# Patient Record
Sex: FEMALE | Race: WHITE | NOT HISPANIC OR LATINO | Employment: PART TIME | URBAN - METROPOLITAN AREA
[De-identification: names, ages, dates, MRNs, and addresses within clinical notes are randomized per-mention and may not be internally consistent; named-entity substitution may affect disease eponyms.]

---

## 2024-05-18 NOTE — PROGRESS NOTES
Jie BEARD  [id number]  1975      Reason for visit:  bilateral pelvic masses     Consultation:  Patient is being seen at the request of Adela SANTANA     History of present illness:  The patient is a 48 y.o. year old  female who presents today for treatment and evaluation of the above issues. Pt endorses 2 months ago started to have severe abdominal pain and nausea and did not seek care due to lack of insurance. Over last 2 months, back and abdominal pain became daily. She had daily nausea with anorexia and would force herself to eat. Denies vaginal and rectal bleeding. Denies breast pain. 1 month ago, was at a tanning bed appt and when she wiped lotion on her belly, she felt masses (mainly on her right side) that she hoped would go away on their own.    Went to her doctors within last 2-3 weeks and had a CT scanning showing bilateral masses.     CT imaging at OSH showed bilateral pelvic masses likely ovarian. Right measures 64b01mc, and left 11x5cm with septations.  is 65      For new patients, [facility] intake form from   was reviewed and confirmed.    OBGYN History:  She is a .  She does not use HRT. She has an IUD in place. She had a BTL 20 years ago. She does not have a history of abnormal pap smears. Last pap smear 6-7 years ago. Has had one mammogram that was normal, but not for many years. No hx of colonoscopy     Oncologic History:  Oncology/Hematology History    No history exists.         Past Medical History:   Diagnosis Date    Abdominal pain     Change in vision     Chills     Cough     Difficulty breathing     Fatigue     Multiple gestation     Nausea     Night sweats     Poor appetite     Urinary tract infection     Weight gain        Past Surgical History:   Procedure Laterality Date    TUBAL ABDOMINAL LIGATION         MEDICATIONS:    Current Outpatient Medications:     ondansetron (ZOFRAN) 4 MG tablet, Take 1 tablet by mouth Every 8 (Eight) Hours As Needed for Nausea or  "Vomiting., Disp: , Rfl:     oxyCODONE-acetaminophen (PERCOCET) 5-325 MG per tablet, Take 1 tablet by mouth Every 4 (Four) Hours As Needed., Disp: , Rfl:      Allergies:  is allergic to amoxicillin and penicillins.    Social History:   Social History     Socioeconomic History    Marital status: Single   Tobacco Use    Smoking status: Every Day     Current packs/day: 1.00     Average packs/day: 1 pack/day for 15.0 years (15.0 ttl pk-yrs)     Types: Cigarettes     Passive exposure: Current    Smokeless tobacco: Never   Vaping Use    Vaping status: Never Used   Substance and Sexual Activity    Alcohol use: Never    Drug use: Never    Sexual activity: Yes     Partners: Male     Birth control/protection: I.U.D., Tubal ligation       Family History:    Family History   Problem Relation Age of Onset    Stroke Father     Hypertension Father     Heart attack Father     Deep vein thrombosis Mother     Leukemia Brother     Testicular cancer Son        Health Maintenance:    Health Maintenance   Topic Date Due    MAMMOGRAM  Never done    COLORECTAL CANCER SCREENING  Never done    TDAP/TD VACCINES (1 - Tdap) Never done    COVID-19 Vaccine (1 - 2023-24 season) Never done    HEPATITIS C SCREENING  Never done    ANNUAL PHYSICAL  Never done    PAP SMEAR  Never done    INFLUENZA VACCINE  08/01/2024    Pneumococcal Vaccine 0-64  Aged Out       Review of Systems:  Please refer to history of present illness.  Review of systems otherwise negative.  Physical Exam:  Vitals:    05/20/24 1006   Resp: 18   Weight: 68.5 kg (151 lb 1.6 oz)   Height: 175.3 cm (69\")   PainSc:   9   PainLoc: Back  Comment: radiates around both sides to the front     Body mass index is 22.31 kg/m².  Wt Readings from Last 3 Encounters:   05/20/24 68.5 kg (151 lb 1.6 oz)     PHQ-9 Depression Screening  Little interest or pleasure in doing things? 0-->not at all   Feeling down, depressed, or hopeless? 0-->not at all   Trouble falling or staying asleep, or sleeping too " much? 3-->nearly every day   Feeling tired or having little energy? 3-->nearly every day   Poor appetite or overeating? 3-->nearly every day   Feeling bad about yourself - or that you are a failure or have let yourself or your family down? 0-->not at all   Trouble concentrating on things, such as reading the newspaper or watching television? 0-->not at all   Moving or speaking so slowly that other people could have noticed? Or the opposite - being so fidgety or restless that you have been moving around a lot more than usual? 0-->not at all   Thoughts that you would be better off dead, or of hurting yourself in some way? 0-->not at all   PHQ-9 Total Score 9   If you checked off any problems, how difficult have these problems made it for you to do your work, take care of things at home, or get along with other people? not difficult at all       GENERAL: Alert, well-appearing female appearing her stated age who is in no apparent distress.   HEENT: Sclera anicteric. Head normocephalic, atraumatic. Mucus membranes moist.   BREASTS: normal fibrocystic changes bilaterally, no LAD   CARDIO: no murmurs, rubs, gallops. Normal heart sounds   RESPIRATORY: normal respiratory effort, CTAB  GASTROINTESTINAL:  Abdomen is soft, mildly tender to palpation (R>L),   no rebound or guarding, bilateral masses that extend 1cm above umbilicus   SKIN:  Warm, dry, well-perfused.  All visible areas intact.  No rashes, lesions, ulcers.  PSYCHIATRIC: AO x3, with appropriate affect, normal thought processes.  NEUROLOGIC: No focal deficits.   Moves extremities well.  MUSCULOSKELETAL: Normal gait and station.   EXTREMITIES:   No cyanosis, clubbing, symmetric.  LYMPHATICS:  No cervical or inguinal adenopathy noted.     PELVIC exam:  External genitalia are free from lesion. On speculum examination, the cervix was free from lesion. On bimanual examination bilateral masses appreciated. + mobility of mass on right side. Uterus was normal in size and  "shape. There is no cervical motion or uterine tenderness. No cervical mass was palpated. Parametria were smooth. Rectovaginal exam was deferred.     ECOG PS 0    PROCEDURES:  None    Diagnostic Data:    No Images in the past 120 days found..    No results found for: \"WBC\", \"HGB\", \"HCT\", \"MCV\", \"PLT\", \"NEUTROABS\", \"GLUCOSE\", \"BUN\", \"CREATININE\", \"EGFRIFNONA\", \"EGFRIFAFRI\", \"NA\", \"K\", \"CL\", \"CO2\", \"MG\", \"PHOS\", \"CALCIUM\", \"ALBUMIN\", \"AST\", \"ALT\", \"BILITOT\"  No results found for: \"TSH\"  No results found for: \"FT4\"  No results found for: \"\"      Assessment & Plan   This is a 48 y.o. woman  here today for bilateral pelvic masses       Encounter Diagnoses   Name Primary?    Pelvic mass Yes    Bilateral tubo-ovarian mass        Pain assessment was performed today as a part of patient’s care.  For patients with pain related to surgery, gynecologic malignancy or cancer treatment, the plan is as noted in the assessment/plan.  For patients with pain not related to these issues, they are to seek any further needed care from a more appropriate provider, such as PCP.      Orders Placed This Encounter   Procedures    CEA     Standing Status:   Future     Standing Expiration Date:   2025     Order Specific Question:   Release to patient     Answer:   Routine Release [[id number]]     Bilateral pelvic masses  - on OSH records was 65. No CEA available, ordered for today  -CT imaging reviewed. Right mass 17x13, left mass 11x5. Appear to be ovarian in origin. + complex features that may be concerning for malignancy. No free fluid in abdomen or other abnormalities noted  - Patient was consented for cystoscopy with temporary bilateral ureteral catheters, total abdominal hysterectomy, bilateral salpingo-oopherectomy, possible omentectomy  -Risks and benefits of surgery were discussed.  This included, but was not limited to, infection and bleeding like when the skin is cut; damage to surrounding structures; and " incisional complications.  Risk of DVT was addressed for major surgeries.  Standard of care efforts to minimize these risks were reviewed.  Typical hospital stay and recovery were discussed as well as post-procedure precautions.  Surgical implications of chronic illnesses on recovery and surgical outcome were reviewed.   -Pain medication regimen for postoperative care was discussed.  Typical regimen and avoidance of narcotics was discussed.  Patient was educated that other factors, such as existing narcotic use, can impact postoperative pain management.    -Risks and benefits of lymph node dissection were further discussed.  This included lymphocyst, hematoma, lymphedema, vascular injury, and nerve injury.  -Patient verbalized understanding of the plan including the risks and benefits.  Appropriate perioperative testing including laboratory evaluation, EKG as clinically indicated, chest x-ray as clinically indicated, and preadmission evaluation were all ordered as a part of this patient's care.    Current smoker   -Encouraged smoking cessation as patient has has a 30+ year pack history and will aid in wound healing and lower post-surgical complication     FOLLOW UP: surgery this week     Noemi Brody MD  OBGYN PGY-3 Resident Physician     I spent 60 minutes caring for Jie on this date of service. This time includes time spent by me in the following activities: preparing for the visit, reviewing tests, performing a medically appropriate examination and/or evaluation, counseling and educating the patient/family/caregiver, ordering medications, tests, or procedures, referring and communicating with other health care professionals, documenting information in the medical record, and care coordination    Patient was seen and examined with Dr. Reardon,  resident, who performed portions of the examination and documentation for this patient's care under my direct supervision.  I agree with the above  documentation and plan.    Shanda Dubois MD  05/20/24  12:53 EDT

## 2024-05-18 NOTE — H&P (VIEW-ONLY)
Jie BEARD  [id number]  1975      Reason for visit:  bilateral pelvic masses     Consultation:  Patient is being seen at the request of Adela SANTANA     History of present illness:  The patient is a 48 y.o. year old  female who presents today for treatment and evaluation of the above issues. Pt endorses 2 months ago started to have severe abdominal pain and nausea and did not seek care due to lack of insurance. Over last 2 months, back and abdominal pain became daily. She had daily nausea with anorexia and would force herself to eat. Denies vaginal and rectal bleeding. Denies breast pain. 1 month ago, was at a tanning bed appt and when she wiped lotion on her belly, she felt masses (mainly on her right side) that she hoped would go away on their own.    Went to her doctors within last 2-3 weeks and had a CT scanning showing bilateral masses.     CT imaging at OSH showed bilateral pelvic masses likely ovarian. Right measures 30h12hh, and left 11x5cm with septations.  is 65      For new patients, Replaced by [facility] intake form from   was reviewed and confirmed.    OBGYN History:  She is a .  She does not use HRT. She has an IUD in place. She had a BTL 20 years ago. She does not have a history of abnormal pap smears. Last pap smear 6-7 years ago. Has had one mammogram that was normal, but not for many years. No hx of colonoscopy     Oncologic History:  Oncology/Hematology History    No history exists.         Past Medical History:   Diagnosis Date    Abdominal pain     Change in vision     Chills     Cough     Difficulty breathing     Fatigue     Multiple gestation     Nausea     Night sweats     Poor appetite     Urinary tract infection     Weight gain        Past Surgical History:   Procedure Laterality Date    TUBAL ABDOMINAL LIGATION         MEDICATIONS:    Current Outpatient Medications:     ondansetron (ZOFRAN) 4 MG tablet, Take 1 tablet by mouth Every 8 (Eight) Hours As Needed for Nausea or  "Vomiting., Disp: , Rfl:     oxyCODONE-acetaminophen (PERCOCET) 5-325 MG per tablet, Take 1 tablet by mouth Every 4 (Four) Hours As Needed., Disp: , Rfl:      Allergies:  is allergic to amoxicillin and penicillins.    Social History:   Social History     Socioeconomic History    Marital status: Single   Tobacco Use    Smoking status: Every Day     Current packs/day: 1.00     Average packs/day: 1 pack/day for 15.0 years (15.0 ttl pk-yrs)     Types: Cigarettes     Passive exposure: Current    Smokeless tobacco: Never   Vaping Use    Vaping status: Never Used   Substance and Sexual Activity    Alcohol use: Never    Drug use: Never    Sexual activity: Yes     Partners: Male     Birth control/protection: I.U.D., Tubal ligation       Family History:    Family History   Problem Relation Age of Onset    Stroke Father     Hypertension Father     Heart attack Father     Deep vein thrombosis Mother     Leukemia Brother     Testicular cancer Son        Health Maintenance:    Health Maintenance   Topic Date Due    MAMMOGRAM  Never done    COLORECTAL CANCER SCREENING  Never done    TDAP/TD VACCINES (1 - Tdap) Never done    COVID-19 Vaccine (1 - 2023-24 season) Never done    HEPATITIS C SCREENING  Never done    ANNUAL PHYSICAL  Never done    PAP SMEAR  Never done    INFLUENZA VACCINE  08/01/2024    Pneumococcal Vaccine 0-64  Aged Out       Review of Systems:  Please refer to history of present illness.  Review of systems otherwise negative.  Physical Exam:  Vitals:    05/20/24 1006   Resp: 18   Weight: 68.5 kg (151 lb 1.6 oz)   Height: 175.3 cm (69\")   PainSc:   9   PainLoc: Back  Comment: radiates around both sides to the front     Body mass index is 22.31 kg/m².  Wt Readings from Last 3 Encounters:   05/20/24 68.5 kg (151 lb 1.6 oz)     PHQ-9 Depression Screening  Little interest or pleasure in doing things? 0-->not at all   Feeling down, depressed, or hopeless? 0-->not at all   Trouble falling or staying asleep, or sleeping too " much? 3-->nearly every day   Feeling tired or having little energy? 3-->nearly every day   Poor appetite or overeating? 3-->nearly every day   Feeling bad about yourself - or that you are a failure or have let yourself or your family down? 0-->not at all   Trouble concentrating on things, such as reading the newspaper or watching television? 0-->not at all   Moving or speaking so slowly that other people could have noticed? Or the opposite - being so fidgety or restless that you have been moving around a lot more than usual? 0-->not at all   Thoughts that you would be better off dead, or of hurting yourself in some way? 0-->not at all   PHQ-9 Total Score 9   If you checked off any problems, how difficult have these problems made it for you to do your work, take care of things at home, or get along with other people? not difficult at all       GENERAL: Alert, well-appearing female appearing her stated age who is in no apparent distress.   HEENT: Sclera anicteric. Head normocephalic, atraumatic. Mucus membranes moist.   BREASTS: normal fibrocystic changes bilaterally, no LAD   CARDIO: no murmurs, rubs, gallops. Normal heart sounds   RESPIRATORY: normal respiratory effort, CTAB  GASTROINTESTINAL:  Abdomen is soft, mildly tender to palpation (R>L),   no rebound or guarding, bilateral masses that extend 1cm above umbilicus   SKIN:  Warm, dry, well-perfused.  All visible areas intact.  No rashes, lesions, ulcers.  PSYCHIATRIC: AO x3, with appropriate affect, normal thought processes.  NEUROLOGIC: No focal deficits.   Moves extremities well.  MUSCULOSKELETAL: Normal gait and station.   EXTREMITIES:   No cyanosis, clubbing, symmetric.  LYMPHATICS:  No cervical or inguinal adenopathy noted.     PELVIC exam:  External genitalia are free from lesion. On speculum examination, the cervix was free from lesion. On bimanual examination bilateral masses appreciated. + mobility of mass on right side. Uterus was normal in size and  "shape. There is no cervical motion or uterine tenderness. No cervical mass was palpated. Parametria were smooth. Rectovaginal exam was deferred.     ECOG PS 0    PROCEDURES:  None    Diagnostic Data:    No Images in the past 120 days found..    No results found for: \"WBC\", \"HGB\", \"HCT\", \"MCV\", \"PLT\", \"NEUTROABS\", \"GLUCOSE\", \"BUN\", \"CREATININE\", \"EGFRIFNONA\", \"EGFRIFAFRI\", \"NA\", \"K\", \"CL\", \"CO2\", \"MG\", \"PHOS\", \"CALCIUM\", \"ALBUMIN\", \"AST\", \"ALT\", \"BILITOT\"  No results found for: \"TSH\"  No results found for: \"FT4\"  No results found for: \"\"      Assessment & Plan   This is a 48 y.o. woman  here today for bilateral pelvic masses       Encounter Diagnoses   Name Primary?    Pelvic mass Yes    Bilateral tubo-ovarian mass        Pain assessment was performed today as a part of patient’s care.  For patients with pain related to surgery, gynecologic malignancy or cancer treatment, the plan is as noted in the assessment/plan.  For patients with pain not related to these issues, they are to seek any further needed care from a more appropriate provider, such as PCP.      Orders Placed This Encounter   Procedures    CEA     Standing Status:   Future     Standing Expiration Date:   2025     Order Specific Question:   Release to patient     Answer:   Routine Release [[id number]]     Bilateral pelvic masses  - on OSH records was 65. No CEA available, ordered for today  -CT imaging reviewed. Right mass 17x13, left mass 11x5. Appear to be ovarian in origin. + complex features that may be concerning for malignancy. No free fluid in abdomen or other abnormalities noted  - Patient was consented for cystoscopy with temporary bilateral ureteral catheters, total abdominal hysterectomy, bilateral salpingo-oopherectomy, possible omentectomy  -Risks and benefits of surgery were discussed.  This included, but was not limited to, infection and bleeding like when the skin is cut; damage to surrounding structures; and " incisional complications.  Risk of DVT was addressed for major surgeries.  Standard of care efforts to minimize these risks were reviewed.  Typical hospital stay and recovery were discussed as well as post-procedure precautions.  Surgical implications of chronic illnesses on recovery and surgical outcome were reviewed.   -Pain medication regimen for postoperative care was discussed.  Typical regimen and avoidance of narcotics was discussed.  Patient was educated that other factors, such as existing narcotic use, can impact postoperative pain management.    -Risks and benefits of lymph node dissection were further discussed.  This included lymphocyst, hematoma, lymphedema, vascular injury, and nerve injury.  -Patient verbalized understanding of the plan including the risks and benefits.  Appropriate perioperative testing including laboratory evaluation, EKG as clinically indicated, chest x-ray as clinically indicated, and preadmission evaluation were all ordered as a part of this patient's care.    Current smoker   -Encouraged smoking cessation as patient has has a 30+ year pack history and will aid in wound healing and lower post-surgical complication     FOLLOW UP: surgery this week     Noemi Brody MD  OBGYN PGY-3 Resident Physician     I spent 60 minutes caring for Jie on this date of service. This time includes time spent by me in the following activities: preparing for the visit, reviewing tests, performing a medically appropriate examination and/or evaluation, counseling and educating the patient/family/caregiver, ordering medications, tests, or procedures, referring and communicating with other health care professionals, documenting information in the medical record, and care coordination    Patient was seen and examined with Dr. Reardon,  resident, who performed portions of the examination and documentation for this patient's care under my direct supervision.  I agree with the above  documentation and plan.    Shanda Dubois MD  05/20/24  12:53 EDT

## 2024-05-20 ENCOUNTER — OFFICE VISIT (OUTPATIENT)
Dept: GYNECOLOGIC ONCOLOGY | Facility: CLINIC | Age: 49
End: 2024-05-20
Payer: COMMERCIAL

## 2024-05-20 ENCOUNTER — PREP FOR SURGERY (OUTPATIENT)
Dept: OTHER | Facility: HOSPITAL | Age: 49
End: 2024-05-20
Payer: COMMERCIAL

## 2024-05-20 VITALS — HEIGHT: 69 IN | RESPIRATION RATE: 18 BRPM | BODY MASS INDEX: 22.38 KG/M2 | WEIGHT: 151.1 LBS

## 2024-05-20 DIAGNOSIS — N83.8 BILATERAL TUBO-OVARIAN MASS: Primary | ICD-10-CM

## 2024-05-20 DIAGNOSIS — N83.8 BILATERAL TUBO-OVARIAN MASS: ICD-10-CM

## 2024-05-20 DIAGNOSIS — R19.00 PELVIC MASS: Primary | ICD-10-CM

## 2024-05-20 DIAGNOSIS — R19.00 PELVIC MASS: ICD-10-CM

## 2024-05-20 PROCEDURE — 1160F RVW MEDS BY RX/DR IN RCRD: CPT | Performed by: OBSTETRICS & GYNECOLOGY

## 2024-05-20 PROCEDURE — 99205 OFFICE O/P NEW HI 60 MIN: CPT | Performed by: OBSTETRICS & GYNECOLOGY

## 2024-05-20 PROCEDURE — 1159F MED LIST DOCD IN RCRD: CPT | Performed by: OBSTETRICS & GYNECOLOGY

## 2024-05-20 PROCEDURE — 1125F AMNT PAIN NOTED PAIN PRSNT: CPT | Performed by: OBSTETRICS & GYNECOLOGY

## 2024-05-20 RX ORDER — ACETAMINOPHEN 500 MG
1000 TABLET ORAL ONCE
Status: CANCELLED | OUTPATIENT
Start: 2024-05-20 | End: 2024-05-20

## 2024-05-20 RX ORDER — SODIUM CHLORIDE 0.9 % (FLUSH) 0.9 %
10 SYRINGE (ML) INJECTION EVERY 12 HOURS SCHEDULED
Status: CANCELLED | OUTPATIENT
Start: 2024-05-20

## 2024-05-20 RX ORDER — HEPARIN SODIUM 5000 [USP'U]/ML
5000 INJECTION, SOLUTION INTRAVENOUS; SUBCUTANEOUS ONCE
Status: CANCELLED | OUTPATIENT
Start: 2024-05-20 | End: 2024-05-20

## 2024-05-20 RX ORDER — OXYCODONE HYDROCHLORIDE AND ACETAMINOPHEN 5; 325 MG/1; MG/1
1 TABLET ORAL EVERY 4 HOURS PRN
COMMUNITY
End: 2024-05-25 | Stop reason: HOSPADM

## 2024-05-20 RX ORDER — ONDANSETRON 4 MG/1
4 TABLET, FILM COATED ORAL EVERY 8 HOURS PRN
COMMUNITY

## 2024-05-20 RX ORDER — SODIUM CHLORIDE 9 MG/ML
40 INJECTION, SOLUTION INTRAVENOUS AS NEEDED
Status: CANCELLED | OUTPATIENT
Start: 2024-05-20

## 2024-05-20 RX ORDER — SODIUM CHLORIDE 0.9 % (FLUSH) 0.9 %
10 SYRINGE (ML) INJECTION AS NEEDED
Status: CANCELLED | OUTPATIENT
Start: 2024-05-20

## 2024-05-20 RX ORDER — SCOLOPAMINE TRANSDERMAL SYSTEM 1 MG/1
1 PATCH, EXTENDED RELEASE TRANSDERMAL CONTINUOUS
Status: CANCELLED | OUTPATIENT
Start: 2024-05-20 | End: 2024-05-23

## 2024-05-20 RX ORDER — CELECOXIB 200 MG/1
200 CAPSULE ORAL ONCE
Status: CANCELLED | OUTPATIENT
Start: 2024-05-20 | End: 2024-05-20

## 2024-05-20 RX ORDER — PREGABALIN 150 MG/1
150 CAPSULE ORAL ONCE
Status: CANCELLED | OUTPATIENT
Start: 2024-05-20 | End: 2024-05-20

## 2024-05-20 NOTE — PATIENT INSTRUCTIONS
Surgery Instructions            Name: GABI BEARD  MRN: [id number]  : 1975      SURGEON:  Shanda Dubois MD    Surgery Coordinator: Tayler MCBRIDE    Gynecological Oncology  [address] [facility] [address] [place] [address], [address]  Phone: [phone]                   Fax: [phone]      Pre-Admission Testing Appointment    You have a Pre-Admission Testing (PAT) appointment on 2024  at 10:30 AM   You will need to be at hospital registration 10 minutes before that time. Directions to PAT and Registration will be listed below.    We understand your time is valuable. To prevent delays, please bring the following to your PAT apt. if it applies to you:  Written physician orders (if given to you by your physician)  All medications in the original bottles including over-the-counter medications (not a list)  Copy of living will or power of  documents   Copy of recent test results (EKG, stress test, echo, heart cath, etc.)  Copy of pacemaker or ICD cards and date of last interrogation   Copy of cardiac clearance letter from your cardiologist or primary care physician if history of heart problems  Name and phone number of your pharmacy, primary care physician and/or cardiologist  CPAP or BiPAP settings    Surgery Appointment      Your surgery has been scheduled on 2024.  You will need to go to Main Registration to check in at 08:00 AM . Then you will be sent to the [address] [place] [address] second floor surgery registration desk to check in.    Nothing by mouth after midnight on 2024.    If you are feeling sick, have a fever or cough and have seen your PCP let our office know 48 hours prior to surgery. It may be subject to rescheduling.       The Day of Surgery:    Do not chew gum or tobacco, smoke, or eat mints or hard candy. Shower and wash your hair. You may brush your teeth but do not swallow water. Use any wipes that Pre-admission testing has given you.     Please arrive for  surgery as instructed by the pre-op nurse, often one to two hours before your surgery.  Once you are called to go to your pre-op room, no one will be allowed in the pre op room.   Please note no one under age 12 is permitted to stay in the waiting area without supervision.  Remove all jewelry, including rings and piercings. Do not bring valuables to the hospital.  Wear loose-fitting clothing.  Avoid wearing eye makeup or contact lenses  We make every effort to begin surgery at your scheduled start time but delays do occur. We will keep you and your family updated about any delays  Please note: you MUST have a  over the age of 18 to drive you home from the hospital. You may not use Uber, Lyft or a taxi.    Please remember to bring:    Photo ID and current medical insurance card  Advanced directives, living will or power of  (if applicable)  Current list of all medications, including over-the- counter and herbal supplements  List of allergies  CPAP device if you have sleep apnea  Any assistive devices or equipment needed after surgery    While You are In the Pre-Op Room:  The nurse will review your health history and will place an IV (into the vein) in your hand or arm for fluids and medicines.  An anesthesia provider will talk with you about anesthesia and pain control during and after surgery.  A member of the surgical team can answer your questions.    Directions to [facility]  [address] [facility] ? [place] [address] ? [phone]    From [address] and [address] [facility]:  Take [address] to the [address] exit. Go right on [address] to [address]. Right on [address] to [employer].   Left on [address] to [facility] which is on the left.    From [address] of [place]:  Get off [address] at the [address] exit. Go left on [address] to [address]. Right on [address] to [employer] [address] [employer]. Left on   [facility] to [facility]  [place] which is on the left.     From the South ([address]):  Follow [address] to approximately one mile inside [address]. [facility] is on the right at [address] and   [facility].     Parking:  Free  Parking - Take Entrance 2 off of [address] and go straight ahead to [address] [place] [address].  Self Parking - Take Entrance 1 off of [address], bear left and follow the road to [facility].    Directions to Registration:  If entering through front of [address] [place] [address] ( parking), take a right and proceed up the hallway connecting [address] [place] [address] to   [facility]. Registration is on the left about [facility] up the [address].    If entering from [facility], take garage elevator to first floor (1), exit to the right and proceed through the doors to outside, follow the covered sidewalk to entrance of [facility], follow signs to [address] [place] [address], this leads to the [facility] lobby and information desk. Proceed past the information desk to the hallway that connects [address] [place] [address] to the [facility]. Registration is on the left about [facility] up the [address].    Directions to Pre-admission Testing:  Follow directions to Registration and Pre-admission Testing is next door to Registration             PREPARING FOR SURGERY  **Disability or Work Release Forms     Work: The amount of time you will be off work after surgery depends on both your surgery and your job. Discuss this with your doctor before surgery. If you have any questions about this, call your doctor.  You must provide all forms completed and signed to the GYN ONCOLOGY office.    FORM FOR AUHTHORIZATION FOR USE AND/OR DISCLOSURE OF PROCTED HEALTH INFORMATION CAN BE PROVIDED UPON REQUEST.    Preoperative Evaluation and Optimization  If your doctor tells you to get a preoperative evaluation from your primary care provider, cardiologist, or other specialist, it is your responsibility to make sure to complete these well before your surgery. We  "want you to get evaluated to make sure you are as healthy as possible when you have your surgery. If the evaluation, including all recommended testing, is not done in time, your surgery will be postponed.    If you take diabetic medications please consult with the prescriber.  Continue antidepressants, Beta Blockers \"olol\", anti-seizure medication, GERD medication (heartburn), Opioids and Parkinson's medication.  Let us know if you have a history of blood clots or are taking a blood thinner before your surgery, this will need to be held and you will need to discuss this with staff.   If you are taking any weight loss medications please let our staff now. Ideally they will need to be held 2 weeks prior to your procedure.  You are allowed 1 visitor that may remain in the waiting room at both locations.  Visitors cannot come back to pre-op or post-op areas.    Please note: you MUST have a  over the age of 18 to drive you home from the hospital. You may not use Uber, Lyft or a taxi.    Physical Fitness  Research shows that getting more physical activity before surgery can lower your risk for problems after surgery. Walking is a great way to improve your fitness level before surgery. Even if you start walking just a few weeks before surgery, it can make a big difference.     Quit Smoking  If you smoke, your risk of having a lung problem is at least twice that of a non-smoker.    Surgical incisions will not heal as well and you have a higher risk of infection  The heart has to work harder.  It is best to quit smoking 6 to 8 weeks before surgery. This gives your lungs more time to recover.    Outpatient Surgery  You will need to have someone bring you to the hospital, stay in the waiting room during your procedure and take you home at discharge. It is recommended that someone stay with you 24 hours after your procedure.     If you live more than a 4-hour drive away from the hospital, or live in an area without easy " access to an emergency department, we recommend you plan to spend another night or two close to the hospital before you go home. For assistance with hotel, prices and vouchers let our office know and we can let you talk with our Oncology Social worker, Meenakshi Paulson.     Post-Operative Visit  You will be scheduled a post-operative appointment for 3 weeks after your surgical procedure. If you do not have an appointment please call the office and have that scheduled.     How to prevent nausea  The best way to prevent nausea is to eat frequent small meals. It is especially important to eat something before taking pain medication. Take your ondansetron if you are feeling nauseous do not wait.    Pain Management after Surgery    If you have kidney disease or liver disease and are not to take ibuprofen or Tylenol please let your doctor or nurse know.     Driving: Do not drive while you are taking prescription pain medications.     It is normal to have some pain after surgery. The goal of managing your acute pain after surgery is to minimize your pain so you feel comfortable enough to get up, take deep breaths, wash, get dressed, and do simple tasks in your home. Some discomfort is likely. We do not expect you to be completely free of pain.   Pain is usually worst the first 24-48 hours after surgery.    What can I do to relieve pain without medications?   Apply heat with a warm compress, hot water bottle, or heating pad. Do not put anything hot directly on your skin or lie on top of it.   Apply cooling with a cold gel pack, bag of peas, or crushed ice. Wrap in a soft cloth or towel.   Do not push or press on your incision. It is normal for your incision to be sore for up to 6 weeks if you push on it.   Unless your doctor gives you a different plan, ibuprofen and acetaminophen are the main medicines you will use to manage your pain.   You may also get a prescription for an opioid such as oxycodone or hydrocodone. Opioids  should only be added as needed to reduce pain that is not adequately relieved by ibuprofen and acetaminophen.                                                                                         Typical Pain Medication Schedule  6 am Ibuprofen 600 mg   9 am acetaminophen 650 mg   12:00 pm Noon Ibuprofen 600 mg   3:00 pm Acetaminophen 650 mg   6:00 pm Ibuprofen 600 mg   9:00 pm Acetaminophen 650 mg   12:00 am Midnight  Ibuprofen 600 mg.      What if this schedule does not control my pain?   Please call the office and let us know at [phone]  Reduce the number and frequency of opioids as soon as you can. Do not take more opioid medication than your doctor has prescribed.   Common side effects and risks of opioids include drowsiness, mental confusion, dizziness, nausea, constipation, itching, dry mouth, and slowed breathing.   Never mix opioids with alcohol, sleep aids or anti-anxiety medications. These are dangerous combinations that increase the harmful effects of opioid pain medication. Many overdose deaths from opioids also involve at least one other drug or alcohol.   It is illegal to sell or share an opioid without a prescription properly issued by a licensed health care prescriber.    What is the best way to stop taking pain medications?  1. Stop opioid use.  2. Stop acetaminophen.  3. Gradually decrease how often you take ibuprofen. It is a good idea to take a 600 mg pill before you start a more tiring activity such as going shopping or for a long walk.  Once you get more active, you may have a day when your pain gets a little worse. If this happens, take ibuprofen. If ibuprofen does not relieve the pain, add acetaminophen.    What do I need to know about bowel movements?   Starting as soon as you get home, take 17 grams of Miralax (one capful) twice a day to keep your stool soft and prevent constipation. It is important to prevent constipation because straining can damage your stitches. Your stool  should be as soft as toothpaste. If your stool gets too loose, cut back to using Miralax only once a day.   If you used a bowel prep before surgery, it is common not to have a bowel movement on the first and second day after surgery.   If you have not had a bowel movement by 7 p.m. on the third day after surgery, do one of the following at bedtime:  Drink 1 ounce (2 tablespoons) of Milk of Magnesia (MOM). If you have used MOM before and know you need to take 2 ounces for it to work for you, it is OK to do this, or Take 2 Senekot tablets.   Go for short walks. Walking and being active will help you have a bowel movement.   If you have not had a bowel movement by noon on the fourth day after surgery, call the clinic where you were seen and ask to speak with a nurse.    What kind of vaginal bleeding is normal?  Spotting of pink or red blood from the vagina is normal. Brown-colored discharge that gradually changes to a light yellow or cream color is also normal and can last up to 6 weeks. The brownish discharge is old blood and often has a strong odor, this is okay. Call us if it becomes heavier or foul smelling or you are saturating a maxi pad within an hour.     At Home after Surgery: If you experience a medical emergency call 911 or have someone drive you to your nearest emergency department.     When should I call my doctor?  Call your doctor right away, any time of the day or night, including on weekends and holidays, if you have any of the following signs or symptoms:   A temperature over 100.4°F (38°C) If you don't have one, please buy a thermometer before your surgery.   Heavy bleeding (soaking a regular pad in an hour or less)   Severe pain in your abdomen or pelvis that the pain medication is not helping   Chest pain or difficulty breathing   Swelling, redness, or pain in your legs   An incision that opens   An incision that is red or hot   Fluid or blood leaking from an incision   New bruising after leaving  the hospital that is large or spreading. A little bit of bruising around an incision is normal.   Nausea and vomiting    Skin rash   Unable to urinate at all   Pain or stinging when you pass urine   Blood or cloudiness in your urine   Non-stop urge to pass urine, but only dribbling when you try to go   A sense that something is wrong.    Caring for post-surgical incisions     Do not have vaginal intercourse until your doctor evaluates you at a postop visit and tells you OK.     Showers: You may shower starting 24 hours after your surgery.    NO BATHS: do not take a tub bath up to 6 weeks after surgery.   Do not put any lotion, oil, gel, or powder on or near your incisions.     For incisions inside your vagina: Incisions inside the vagina are closed with dissolvable stitches. When they dissolve you may see little bits of suture material that look like thin pieces of string on your underwear or on toilet tissue after wiping. This is normal. Do not put anything inside the vagina until your doctor evaluates you at a postop visit and tells you when it will be OK.      For incisions on your skin: If there is a dressing over the incision, remove it before your first shower. Leave the slim adhesive strips that are under the dressing in place. During the week after surgery, they will usually curl up at the edges and then come off on their own. If they are still there a week after surgery, gently remove them.  To clean the incisions, first wash your hands, and then get your hands sudsy with soap and gently wash or let the sudsy water run down over the incisions. Dry the incisions well after washing by gently patting with a towel. You may use a blow dryer, but it must be on a low-heat setting.    When will my bladder function get back to normal?   You received extra fluid through your I.V. while you were in the hospital, so it is normal to urinate (pee) more than usual when you first get home.   It is normal for your bladder  function to be different after surgery. You may notice a pause before your urine stream starts or that your urine stream is slower. This will gradually get better, but it may take up to 6 months before you are back to normal. Be patient, relax, and sit on the toilet a little longer.   Drinking more water than usual will not help the bladder recover faster.    What is a normal energy level?  It is normal to have a decreased energy level after surgery. Listen to your body. If you need to rest, do it. Give yourself permission to take it easy. Once you settle into a normal routine at home, you will find that you slowly begin to feel better. Walking around the house and taking short walks outside will help you get back to normal.    What kind of exercise/activities can I do?   Exercise is important for a healthy recovery. We encourage you to begin normal physical activity, like walking, within hours of surgery. Start with short walks and gradually increase the distance and length of time that you walk.   Allow your body time to heal. Do not restart a difficult exercise routine until you have had your post-op exam and your doctor says it is OK.   Lifting: Unless you are given other instructions, for 6 weeks after your surgery do not lift anything over 15 pounds.   Travel: It is best if you do not go far away from home before your postop visit with your doctor. If you have travel plans, talk to your doctor about this before your surgery.      Financial Assistance:    If you have any questions or need assistance, contact your [facility] financial counseling office from 8:30 a.m.-4:30  p.m. Monday through Friday. Closed weekends.   [place]: [phone] or, or visit at [address] [facility], [address], near the entrance.  Financial Assistance Application available upon request      Patient Payments and Correspondence  Customer service representatives are available to assist you from 8:00 a.m. to 6:00 p.m. Eastern  Standard Time by calling [phone] Monday through Friday. You can also contact us through [employer].    [facility]  [address]  [place] [address] [phone]

## 2024-05-21 ENCOUNTER — PRE-ADMISSION TESTING (OUTPATIENT)
Dept: PREADMISSION TESTING | Facility: HOSPITAL | Age: 49
DRG: 738 | End: 2024-05-21
Payer: COMMERCIAL

## 2024-05-21 ENCOUNTER — HOSPITAL ENCOUNTER (OUTPATIENT)
Dept: GENERAL RADIOLOGY | Facility: HOSPITAL | Age: 49
Discharge: HOME | End: 2024-05-21
Payer: COMMERCIAL

## 2024-05-21 VITALS — WEIGHT: 150.57 LBS | HEIGHT: 69 IN | BODY MASS INDEX: 22.3 KG/M2

## 2024-05-21 DIAGNOSIS — R19.00 PELVIC MASS: ICD-10-CM

## 2024-05-21 DIAGNOSIS — N83.8 BILATERAL TUBO-OVARIAN MASS: ICD-10-CM

## 2024-05-21 LAB
ABO GROUP BLD: NORMAL
ALBUMIN SERPL-MCNC: 4.6 G/DL (ref 3.5–5.2)
ALBUMIN/GLOB SERPL: 1.4 G/DL
ALP SERPL-CCNC: 87 U/L (ref 39–117)
ALT SERPL W P-5'-P-CCNC: 18 U/L (ref 1–33)
ANION GAP SERPL CALCULATED.3IONS-SCNC: 7 MMOL/L (ref 5–15)
AST SERPL-CCNC: 22 U/L (ref 1–32)
B-HCG UR QL: NEGATIVE
BASOPHILS # BLD AUTO: 0.05 10*3/MM3 (ref 0–0.2)
BASOPHILS NFR BLD AUTO: 0.5 % (ref 0–1.5)
BILIRUB SERPL-MCNC: 0.3 MG/DL (ref 0–1.2)
BLD GP AB SCN SERPL QL: NEGATIVE
BUN SERPL-MCNC: 11 MG/DL (ref 6–20)
BUN/CREAT SERPL: 13.3 (ref 7–25)
CALCIUM SPEC-SCNC: 9.8 MG/DL (ref 8.6–10.5)
CHLORIDE SERPL-SCNC: 103 MMOL/L (ref 98–107)
CO2 SERPL-SCNC: 29 MMOL/L (ref 22–29)
CREAT SERPL-MCNC: 0.83 MG/DL (ref 0.57–1)
DEPRECATED RDW RBC AUTO: 44.7 FL (ref 37–54)
EGFRCR SERPLBLD CKD-EPI 2021: 86.5 ML/MIN/1.73
EOSINOPHIL # BLD AUTO: 0.1 10*3/MM3 (ref 0–0.4)
EOSINOPHIL NFR BLD AUTO: 1 % (ref 0.3–6.2)
ERYTHROCYTE [DISTWIDTH] IN BLOOD BY AUTOMATED COUNT: 13.6 % (ref 12.3–15.4)
GLOBULIN UR ELPH-MCNC: 3.2 GM/DL
GLUCOSE SERPL-MCNC: 90 MG/DL (ref 65–99)
HCT VFR BLD AUTO: 41 % (ref 34–46.6)
HGB BLD-MCNC: 13.5 G/DL (ref 12–15.9)
IMM GRANULOCYTES # BLD AUTO: 0.03 10*3/MM3 (ref 0–0.05)
IMM GRANULOCYTES NFR BLD AUTO: 0.3 % (ref 0–0.5)
LYMPHOCYTES # BLD AUTO: 2.69 10*3/MM3 (ref 0.7–3.1)
LYMPHOCYTES NFR BLD AUTO: 26.4 % (ref 19.6–45.3)
MCH RBC QN AUTO: 29.4 PG (ref 26.6–33)
MCHC RBC AUTO-ENTMCNC: 32.9 G/DL (ref 31.5–35.7)
MCV RBC AUTO: 89.3 FL (ref 79–97)
MONOCYTES # BLD AUTO: 0.48 10*3/MM3 (ref 0.1–0.9)
MONOCYTES NFR BLD AUTO: 4.7 % (ref 5–12)
NEUTROPHILS NFR BLD AUTO: 6.83 10*3/MM3 (ref 1.7–7)
NEUTROPHILS NFR BLD AUTO: 67.1 % (ref 42.7–76)
NRBC BLD AUTO-RTO: 0 /100 WBC (ref 0–0.2)
PLATELET # BLD AUTO: 362 10*3/MM3 (ref 140–450)
PMV BLD AUTO: 9.1 FL (ref 6–12)
POTASSIUM SERPL-SCNC: 4.3 MMOL/L (ref 3.5–5.2)
PROT SERPL-MCNC: 7.8 G/DL (ref 6–8.5)
RBC # BLD AUTO: 4.59 10*6/MM3 (ref 3.77–5.28)
RH BLD: POSITIVE
SODIUM SERPL-SCNC: 139 MMOL/L (ref 136–145)
T&S EXPIRATION DATE: NORMAL
WBC NRBC COR # BLD AUTO: 10.18 10*3/MM3 (ref 3.4–10.8)

## 2024-05-21 PROCEDURE — 81025 URINE PREGNANCY TEST: CPT

## 2024-05-21 PROCEDURE — 86900 BLOOD TYPING SEROLOGIC ABO: CPT

## 2024-05-21 PROCEDURE — 86901 BLOOD TYPING SEROLOGIC RH(D): CPT

## 2024-05-21 PROCEDURE — 36415 COLL VENOUS BLD VENIPUNCTURE: CPT

## 2024-05-21 PROCEDURE — 71045 X-RAY EXAM CHEST 1 VIEW: CPT

## 2024-05-21 PROCEDURE — 80053 COMPREHEN METABOLIC PANEL: CPT

## 2024-05-21 PROCEDURE — 86850 RBC ANTIBODY SCREEN: CPT

## 2024-05-21 PROCEDURE — 85025 COMPLETE CBC W/AUTO DIFF WBC: CPT

## 2024-05-21 NOTE — PAT
An arrival time for procedure was not provided during PAT visit. If patient had any questions or concerns about their arrival time, they were instructed to contact their surgeon/physician.  Additionally, if the patient referred to an arrival time that was acquired from their my chart account, patient was encouraged to verify that time with their surgeon/physician. Arrival times are NOT provided in Pre Admission Testing Department.    Patient to apply Chlorhexadine wipes  to surgical area (as instructed) the night before procedure and the AM of procedure. Wipes provided.    Patient denies any current skin issues.     Blood bank bracelet applied to patient during Pre Admission Testing visit.  Patient instructed not to remove from arm until after procedure and they are discharged from the hospital.  Explained to patient that they may shower and get the bracelet wet, but not to immerse under water for longer periods (bathing, swimming, hand dishwashing, etc).  Patient verbalized understanding.    It was noted during Pre Admission Testing that patient was wearing some form of fingernail polish (gel/regular) and/or acrylic/artificial nails.  Patient was told that polish and/or artificial nails must be removed for surgery.  If a patient had recent manicure, and would rather not remove polish or artificial nails. Then the minimum requirement is that the polish/artificial nails must be removed from the middle finger on each hand.  Patient verbalized understanding.    If patient was having surgery on an upper extremity, then the patient was instructed that fingernail polish/artificial fingernails must be removed for surgery.  NO EXCEPTIONS.  Patient verbalized understanding.    If patient was having surgery on a lower extremity, then the patient was instructed that toenail polish on both extremities must be removed for surgery.  NO EXCEPTIONS. Patient verbalized understanding.    Patient directed to Radiology Department for  CXR after Pre Admission Testing Appointment.       Patient viewed general PAT education video as instructed in their preoperative information received from their surgeon.  Patient stated the general PAT education video was viewed in its entirety and survey completed.  Copies of PAT general education handouts (Incentive Spirometry, Meds to Beds Program, Patient Belongings, Pre-op skin preparation instructions, Blood Glucose testing, Visitor policy, Surgery FAQ, Code H) distributed to patient if not printed. Education related to the PAT pass and skin preparation for surgery (if applicable) completed in PAT as a reinforcement to PAT education video. Patient instructed to return PAT pass provided today as well as completed skin preparation sheet (if applicable) on the day of procedure.     Additionally if patient had not viewed video yet but intended to view it at home or in our waiting area, then referred them to the handout with QR code/link provided during PAT visit.  Instructed patient to complete survey after viewing the video in its entirety.  Encouraged patient/family to read PAT general education handouts thoroughly and notify PAT staff with any questions or concerns. Patient verbalized understanding of all information and priority content.

## 2024-05-22 ENCOUNTER — TELEPHONE (OUTPATIENT)
Dept: GYNECOLOGIC ONCOLOGY | Facility: CLINIC | Age: 49
End: 2024-05-22
Payer: COMMERCIAL

## 2024-05-22 NOTE — TELEPHONE ENCOUNTER
CONFIRM SURGERY ON 05/23/2024 PLEASE ARRIVE ON 10:45 AM TO MAIN REGISTRATION AT [facility]    NPO AFTER MIDNIGHT ON 05/22/2024

## 2024-05-23 ENCOUNTER — ANESTHESIA EVENT (OUTPATIENT)
Dept: PERIOP | Facility: HOSPITAL | Age: 49
End: 2024-05-23
Payer: COMMERCIAL

## 2024-05-23 ENCOUNTER — ANESTHESIA (OUTPATIENT)
Dept: PERIOP | Facility: HOSPITAL | Age: 49
End: 2024-05-23
Payer: COMMERCIAL

## 2024-05-23 ENCOUNTER — HOSPITAL ENCOUNTER (INPATIENT)
Facility: HOSPITAL | Age: 49
LOS: 2 days | Discharge: HOME | DRG: 738 | End: 2024-05-25
Attending: OBSTETRICS & GYNECOLOGY | Admitting: OBSTETRICS & GYNECOLOGY
Payer: COMMERCIAL

## 2024-05-23 ENCOUNTER — PATIENT ROUNDING (BHMG ONLY) (OUTPATIENT)
Dept: GYNECOLOGIC ONCOLOGY | Facility: CLINIC | Age: 49
End: 2024-05-23
Payer: COMMERCIAL

## 2024-05-23 ENCOUNTER — ANESTHESIA EVENT CONVERTED (OUTPATIENT)
Dept: ANESTHESIOLOGY | Facility: HOSPITAL | Age: 49
DRG: 738 | End: 2024-05-23
Payer: COMMERCIAL

## 2024-05-23 DIAGNOSIS — C56.3 MALIGNANT NEOPLASM OF BOTH OVARIES: Primary | ICD-10-CM

## 2024-05-23 DIAGNOSIS — N83.8 BILATERAL TUBO-OVARIAN MASS: ICD-10-CM

## 2024-05-23 DIAGNOSIS — R19.00 PELVIC MASS: ICD-10-CM

## 2024-05-23 LAB
ABO GROUP BLD: NORMAL
B-HCG UR QL: NEGATIVE
EXPIRATION DATE: NORMAL
INTERNAL NEGATIVE CONTROL: NEGATIVE
INTERNAL POSITIVE CONTROL: POSITIVE
LAB AP CASE REPORT: NORMAL
Lab: NORMAL
Lab: NORMAL
RH BLD: POSITIVE

## 2024-05-23 PROCEDURE — 25010000002 PHENYLEPHRINE 10 MG/ML SOLUTION 1 ML VIAL: Performed by: ANESTHESIOLOGY

## 2024-05-23 PROCEDURE — 25010000002 BUPIVACAINE (PF) 0.25 % SOLUTION: Performed by: NURSE ANESTHETIST, CERTIFIED REGISTERED

## 2024-05-23 PROCEDURE — 25010000002 MIDAZOLAM PER 1 MG: Performed by: ANESTHESIOLOGY

## 2024-05-23 PROCEDURE — 88305 TISSUE EXAM BY PATHOLOGIST: CPT | Performed by: OBSTETRICS & GYNECOLOGY

## 2024-05-23 PROCEDURE — 25810000003 SODIUM CHLORIDE PER 500 ML: Performed by: OBSTETRICS & GYNECOLOGY

## 2024-05-23 PROCEDURE — 86901 BLOOD TYPING SEROLOGIC RH(D): CPT

## 2024-05-23 PROCEDURE — 25810000003 LACTATED RINGERS PER 1000 ML: Performed by: STUDENT IN AN ORGANIZED HEALTH CARE EDUCATION/TRAINING PROGRAM

## 2024-05-23 PROCEDURE — 25010000002 FENTANYL CITRATE (PF) 50 MCG/ML SOLUTION: Performed by: NURSE ANESTHETIST, CERTIFIED REGISTERED

## 2024-05-23 PROCEDURE — 86900 BLOOD TYPING SEROLOGIC ABO: CPT

## 2024-05-23 PROCEDURE — 0UT90ZZ RESECTION OF UTERUS, OPEN APPROACH: ICD-10-PCS | Performed by: OBSTETRICS & GYNECOLOGY

## 2024-05-23 PROCEDURE — 0DBU0ZZ EXCISION OF OMENTUM, OPEN APPROACH: ICD-10-PCS | Performed by: OBSTETRICS & GYNECOLOGY

## 2024-05-23 PROCEDURE — 25010000002 ONDANSETRON PER 1 MG

## 2024-05-23 PROCEDURE — 0UT20ZZ RESECTION OF BILATERAL OVARIES, OPEN APPROACH: ICD-10-PCS | Performed by: OBSTETRICS & GYNECOLOGY

## 2024-05-23 PROCEDURE — 25010000002 PROPOFOL 10 MG/ML EMULSION: Performed by: ANESTHESIOLOGY

## 2024-05-23 PROCEDURE — 52005 CYSTO W/URTRL CATHJ: CPT | Performed by: OBSTETRICS & GYNECOLOGY

## 2024-05-23 PROCEDURE — 81025 URINE PREGNANCY TEST: CPT | Performed by: OBSTETRICS & GYNECOLOGY

## 2024-05-23 PROCEDURE — 25010000002 DEXAMETHASONE SODIUM PHOSPHATE 10 MG/ML SOLUTION: Performed by: NURSE ANESTHETIST, CERTIFIED REGISTERED

## 2024-05-23 PROCEDURE — C1758 CATHETER, URETERAL: HCPCS | Performed by: OBSTETRICS & GYNECOLOGY

## 2024-05-23 PROCEDURE — 88360 TUMOR IMMUNOHISTOCHEM/MANUAL: CPT | Performed by: OBSTETRICS & GYNECOLOGY

## 2024-05-23 PROCEDURE — 25010000002 ONDANSETRON PER 1 MG: Performed by: ANESTHESIOLOGY

## 2024-05-23 PROCEDURE — 88307 TISSUE EXAM BY PATHOLOGIST: CPT | Performed by: OBSTETRICS & GYNECOLOGY

## 2024-05-23 PROCEDURE — 0TJB8ZZ INSPECTION OF BLADDER, VIA NATURAL OR ARTIFICIAL OPENING ENDOSCOPIC: ICD-10-PCS | Performed by: OBSTETRICS & GYNECOLOGY

## 2024-05-23 PROCEDURE — 0UT70ZZ RESECTION OF BILATERAL FALLOPIAN TUBES, OPEN APPROACH: ICD-10-PCS | Performed by: OBSTETRICS & GYNECOLOGY

## 2024-05-23 PROCEDURE — 0DBW0ZX EXCISION OF PERITONEUM, OPEN APPROACH, DIAGNOSTIC: ICD-10-PCS | Performed by: OBSTETRICS & GYNECOLOGY

## 2024-05-23 PROCEDURE — 88302 TISSUE EXAM BY PATHOLOGIST: CPT | Performed by: OBSTETRICS & GYNECOLOGY

## 2024-05-23 PROCEDURE — 25810000003 LACTATED RINGERS SOLUTION: Performed by: STUDENT IN AN ORGANIZED HEALTH CARE EDUCATION/TRAINING PROGRAM

## 2024-05-23 PROCEDURE — 25810000003 LACTATED RINGERS PER 1000 ML: Performed by: OBSTETRICS & GYNECOLOGY

## 2024-05-23 PROCEDURE — 25010000002 SUGAMMADEX 200 MG/2ML SOLUTION: Performed by: ANESTHESIOLOGY

## 2024-05-23 PROCEDURE — 25010000002 HEPARIN (PORCINE) PER 1000 UNITS: Performed by: OBSTETRICS & GYNECOLOGY

## 2024-05-23 PROCEDURE — 58952 RESECT OVARIAN MALIGNANCY: CPT | Performed by: OBSTETRICS & GYNECOLOGY

## 2024-05-23 PROCEDURE — 07BC0ZX EXCISION OF PELVIS LYMPHATIC, OPEN APPROACH, DIAGNOSTIC: ICD-10-PCS | Performed by: OBSTETRICS & GYNECOLOGY

## 2024-05-23 PROCEDURE — 25010000002 CEFAZOLIN PER 500 MG: Performed by: OBSTETRICS & GYNECOLOGY

## 2024-05-23 PROCEDURE — 25010000002 HYDROMORPHONE 1 MG/ML SOLUTION

## 2024-05-23 PROCEDURE — 88342 IMHCHEM/IMCYTCHM 1ST ANTB: CPT | Performed by: OBSTETRICS & GYNECOLOGY

## 2024-05-23 PROCEDURE — 0DTJ0ZZ RESECTION OF APPENDIX, OPEN APPROACH: ICD-10-PCS | Performed by: OBSTETRICS & GYNECOLOGY

## 2024-05-23 PROCEDURE — 88341 IMHCHEM/IMCYTCHM EA ADD ANTB: CPT | Performed by: OBSTETRICS & GYNECOLOGY

## 2024-05-23 PROCEDURE — 25810000003 LACTATED RINGERS PER 1000 ML: Performed by: ANESTHESIOLOGY

## 2024-05-23 PROCEDURE — 88331 PATH CONSLTJ SURG 1 BLK 1SPC: CPT | Performed by: PATHOLOGY

## 2024-05-23 PROCEDURE — 25010000002 DEXAMETHASONE PER 1 MG: Performed by: ANESTHESIOLOGY

## 2024-05-23 RX ORDER — IBUPROFEN 600 MG/1
600 TABLET ORAL EVERY 6 HOURS
Status: DISCONTINUED | OUTPATIENT
Start: 2024-05-24 | End: 2024-05-25

## 2024-05-23 RX ORDER — BUPIVACAINE HYDROCHLORIDE 2.5 MG/ML
INJECTION, SOLUTION EPIDURAL; INFILTRATION; INTRACAUDAL
Status: COMPLETED | OUTPATIENT
Start: 2024-05-23 | End: 2024-05-23

## 2024-05-23 RX ORDER — PHENYLEPHRINE HCL IN 0.9% NACL 1 MG/10 ML
SYRINGE (ML) INTRAVENOUS AS NEEDED
Status: DISCONTINUED | OUTPATIENT
Start: 2024-05-23 | End: 2024-05-23 | Stop reason: SURG

## 2024-05-23 RX ORDER — SODIUM CHLORIDE, SODIUM LACTATE, POTASSIUM CHLORIDE, CALCIUM CHLORIDE 600; 310; 30; 20 MG/100ML; MG/100ML; MG/100ML; MG/100ML
75 INJECTION, SOLUTION INTRAVENOUS CONTINUOUS
Status: DISCONTINUED | OUTPATIENT
Start: 2024-05-23 | End: 2024-05-24

## 2024-05-23 RX ORDER — ONDANSETRON 2 MG/ML
4 INJECTION INTRAMUSCULAR; INTRAVENOUS EVERY 6 HOURS PRN
Status: DISCONTINUED | OUTPATIENT
Start: 2024-05-24 | End: 2024-05-24

## 2024-05-23 RX ORDER — CELECOXIB 200 MG/1
200 CAPSULE ORAL ONCE
Status: COMPLETED | OUTPATIENT
Start: 2024-05-23 | End: 2024-05-23

## 2024-05-23 RX ORDER — OXYCODONE HYDROCHLORIDE 5 MG/1
5 TABLET ORAL EVERY 4 HOURS PRN
Status: DISCONTINUED | OUTPATIENT
Start: 2024-05-23 | End: 2024-05-25 | Stop reason: HOSPADM

## 2024-05-23 RX ORDER — ACETAMINOPHEN 500 MG
1000 TABLET ORAL ONCE
Status: COMPLETED | OUTPATIENT
Start: 2024-05-23 | End: 2024-05-23

## 2024-05-23 RX ORDER — MIDAZOLAM HYDROCHLORIDE 1 MG/ML
1 INJECTION INTRAMUSCULAR; INTRAVENOUS ONCE
Status: COMPLETED | OUTPATIENT
Start: 2024-05-23 | End: 2024-05-23

## 2024-05-23 RX ORDER — DROPERIDOL 2.5 MG/ML
0.62 INJECTION, SOLUTION INTRAMUSCULAR; INTRAVENOUS ONCE AS NEEDED
Status: DISCONTINUED | OUTPATIENT
Start: 2024-05-23 | End: 2024-05-23 | Stop reason: HOSPADM

## 2024-05-23 RX ORDER — AMOXICILLIN 250 MG
2 CAPSULE ORAL 2 TIMES DAILY PRN
Status: DISCONTINUED | OUTPATIENT
Start: 2024-05-23 | End: 2024-05-25

## 2024-05-23 RX ORDER — SODIUM CHLORIDE 9 MG/ML
INJECTION, SOLUTION INTRAVENOUS AS NEEDED
Status: DISCONTINUED | OUTPATIENT
Start: 2024-05-23 | End: 2024-05-23 | Stop reason: HOSPADM

## 2024-05-23 RX ORDER — ONDANSETRON 2 MG/ML
INJECTION INTRAMUSCULAR; INTRAVENOUS AS NEEDED
Status: DISCONTINUED | OUTPATIENT
Start: 2024-05-23 | End: 2024-05-23 | Stop reason: SURG

## 2024-05-23 RX ORDER — ONDANSETRON 2 MG/ML
INJECTION INTRAMUSCULAR; INTRAVENOUS
Status: COMPLETED
Start: 2024-05-23 | End: 2024-05-23

## 2024-05-23 RX ORDER — FAMOTIDINE 20 MG/1
20 TABLET, FILM COATED ORAL DAILY
Status: DISCONTINUED | OUTPATIENT
Start: 2024-05-24 | End: 2024-05-25 | Stop reason: HOSPADM

## 2024-05-23 RX ORDER — CALCIUM CARBONATE 500 MG/1
1 TABLET, CHEWABLE ORAL 3 TIMES DAILY PRN
Status: DISCONTINUED | OUTPATIENT
Start: 2024-05-23 | End: 2024-05-25 | Stop reason: HOSPADM

## 2024-05-23 RX ORDER — PROPOFOL 10 MG/ML
VIAL (ML) INTRAVENOUS AS NEEDED
Status: DISCONTINUED | OUTPATIENT
Start: 2024-05-23 | End: 2024-05-23 | Stop reason: SURG

## 2024-05-23 RX ORDER — FAMOTIDINE 10 MG/ML
20 INJECTION, SOLUTION INTRAVENOUS
Status: COMPLETED | OUTPATIENT
Start: 2024-05-23 | End: 2024-05-23

## 2024-05-23 RX ORDER — FENTANYL CITRATE 50 UG/ML
50 INJECTION, SOLUTION INTRAMUSCULAR; INTRAVENOUS
Status: DISCONTINUED | OUTPATIENT
Start: 2024-05-23 | End: 2024-05-23 | Stop reason: HOSPADM

## 2024-05-23 RX ORDER — PROMETHAZINE HYDROCHLORIDE 12.5 MG/1
12.5 SUPPOSITORY RECTAL EVERY 6 HOURS PRN
Status: DISCONTINUED | OUTPATIENT
Start: 2024-05-23 | End: 2024-05-25 | Stop reason: HOSPADM

## 2024-05-23 RX ORDER — FAMOTIDINE 20 MG/1
20 TABLET, FILM COATED ORAL
Status: COMPLETED | OUTPATIENT
Start: 2024-05-23 | End: 2024-05-23

## 2024-05-23 RX ORDER — ACETAMINOPHEN 325 MG/1
650 TABLET ORAL EVERY 6 HOURS SCHEDULED
Status: DISCONTINUED | OUTPATIENT
Start: 2024-05-23 | End: 2024-05-25 | Stop reason: HOSPADM

## 2024-05-23 RX ORDER — PROCHLORPERAZINE EDISYLATE 5 MG/ML
2.5 INJECTION INTRAMUSCULAR; INTRAVENOUS EVERY 6 HOURS PRN
Status: DISCONTINUED | OUTPATIENT
Start: 2024-05-23 | End: 2024-05-24

## 2024-05-23 RX ORDER — PROMETHAZINE HYDROCHLORIDE 12.5 MG/1
12.5 TABLET ORAL EVERY 6 HOURS PRN
Status: DISCONTINUED | OUTPATIENT
Start: 2024-05-23 | End: 2024-05-25 | Stop reason: HOSPADM

## 2024-05-23 RX ORDER — DEXAMETHASONE SODIUM PHOSPHATE 4 MG/ML
INJECTION, SOLUTION INTRA-ARTICULAR; INTRALESIONAL; INTRAMUSCULAR; INTRAVENOUS; SOFT TISSUE AS NEEDED
Status: DISCONTINUED | OUTPATIENT
Start: 2024-05-23 | End: 2024-05-23 | Stop reason: SURG

## 2024-05-23 RX ORDER — DEXAMETHASONE SODIUM PHOSPHATE 10 MG/ML
INJECTION, SOLUTION INTRAMUSCULAR; INTRAVENOUS
Status: COMPLETED | OUTPATIENT
Start: 2024-05-23 | End: 2024-05-23

## 2024-05-23 RX ORDER — HYDROMORPHONE HYDROCHLORIDE 1 MG/ML
0.5 INJECTION, SOLUTION INTRAMUSCULAR; INTRAVENOUS; SUBCUTANEOUS
Status: DISCONTINUED | OUTPATIENT
Start: 2024-05-23 | End: 2024-05-25

## 2024-05-23 RX ORDER — SODIUM CHLORIDE, SODIUM LACTATE, POTASSIUM CHLORIDE, CALCIUM CHLORIDE 600; 310; 30; 20 MG/100ML; MG/100ML; MG/100ML; MG/100ML
INJECTION, SOLUTION INTRAVENOUS CONTINUOUS PRN
Status: DISCONTINUED | OUTPATIENT
Start: 2024-05-23 | End: 2024-05-23 | Stop reason: SURG

## 2024-05-23 RX ORDER — SCOLOPAMINE TRANSDERMAL SYSTEM 1 MG/1
1 PATCH, EXTENDED RELEASE TRANSDERMAL CONTINUOUS
Status: DISCONTINUED | OUTPATIENT
Start: 2024-05-23 | End: 2024-05-25 | Stop reason: HOSPADM

## 2024-05-23 RX ORDER — MAGNESIUM HYDROXIDE 1200 MG/15ML
LIQUID ORAL AS NEEDED
Status: DISCONTINUED | OUTPATIENT
Start: 2024-05-23 | End: 2024-05-23 | Stop reason: HOSPADM

## 2024-05-23 RX ORDER — SODIUM CHLORIDE 0.9 % (FLUSH) 0.9 %
10 SYRINGE (ML) INJECTION AS NEEDED
Status: DISCONTINUED | OUTPATIENT
Start: 2024-05-23 | End: 2024-05-23 | Stop reason: HOSPADM

## 2024-05-23 RX ORDER — HEPARIN SODIUM 5000 [USP'U]/ML
INJECTION, SOLUTION INTRAVENOUS; SUBCUTANEOUS AS NEEDED
Status: DISCONTINUED | OUTPATIENT
Start: 2024-05-23 | End: 2024-05-23 | Stop reason: HOSPADM

## 2024-05-23 RX ORDER — ONDANSETRON 2 MG/ML
4 INJECTION INTRAMUSCULAR; INTRAVENOUS ONCE AS NEEDED
Status: COMPLETED | OUTPATIENT
Start: 2024-05-23 | End: 2024-05-23

## 2024-05-23 RX ORDER — HYDROMORPHONE HYDROCHLORIDE 1 MG/ML
0.5 INJECTION, SOLUTION INTRAMUSCULAR; INTRAVENOUS; SUBCUTANEOUS
Status: DISCONTINUED | OUTPATIENT
Start: 2024-05-23 | End: 2024-05-23 | Stop reason: HOSPADM

## 2024-05-23 RX ORDER — NALOXONE HCL 0.4 MG/ML
0.1 VIAL (ML) INJECTION
Status: DISCONTINUED | OUTPATIENT
Start: 2024-05-23 | End: 2024-05-25 | Stop reason: HOSPADM

## 2024-05-23 RX ORDER — SODIUM CHLORIDE, SODIUM LACTATE, POTASSIUM CHLORIDE, CALCIUM CHLORIDE 600; 310; 30; 20 MG/100ML; MG/100ML; MG/100ML; MG/100ML
1000 INJECTION, SOLUTION INTRAVENOUS CONTINUOUS
Status: DISCONTINUED | OUTPATIENT
Start: 2024-05-23 | End: 2024-05-24

## 2024-05-23 RX ORDER — OXYCODONE HYDROCHLORIDE 10 MG/1
10 TABLET ORAL EVERY 4 HOURS PRN
Status: DISCONTINUED | OUTPATIENT
Start: 2024-05-23 | End: 2024-05-25 | Stop reason: HOSPADM

## 2024-05-23 RX ORDER — ONDANSETRON 4 MG/1
4 TABLET, ORALLY DISINTEGRATING ORAL EVERY 6 HOURS PRN
Status: DISCONTINUED | OUTPATIENT
Start: 2024-05-24 | End: 2024-05-24

## 2024-05-23 RX ORDER — HEPARIN SODIUM 5000 [USP'U]/ML
5000 INJECTION, SOLUTION INTRAVENOUS; SUBCUTANEOUS ONCE
Status: DISCONTINUED | OUTPATIENT
Start: 2024-05-23 | End: 2024-05-23 | Stop reason: HOSPADM

## 2024-05-23 RX ORDER — LIDOCAINE HYDROCHLORIDE 10 MG/ML
INJECTION, SOLUTION EPIDURAL; INFILTRATION; INTRACAUDAL; PERINEURAL AS NEEDED
Status: DISCONTINUED | OUTPATIENT
Start: 2024-05-23 | End: 2024-05-23 | Stop reason: SURG

## 2024-05-23 RX ORDER — SIMETHICONE 80 MG
80 TABLET,CHEWABLE ORAL 4 TIMES DAILY PRN
Status: DISCONTINUED | OUTPATIENT
Start: 2024-05-23 | End: 2024-05-25 | Stop reason: HOSPADM

## 2024-05-23 RX ORDER — LIDOCAINE HYDROCHLORIDE 10 MG/ML
0.5 INJECTION, SOLUTION EPIDURAL; INFILTRATION; INTRACAUDAL; PERINEURAL ONCE AS NEEDED
Status: COMPLETED | OUTPATIENT
Start: 2024-05-23 | End: 2024-05-23

## 2024-05-23 RX ORDER — PREGABALIN 150 MG/1
150 CAPSULE ORAL ONCE
Status: COMPLETED | OUTPATIENT
Start: 2024-05-23 | End: 2024-05-23

## 2024-05-23 RX ORDER — SODIUM CHLORIDE 0.9 % (FLUSH) 0.9 %
10 SYRINGE (ML) INJECTION EVERY 12 HOURS SCHEDULED
Status: DISCONTINUED | OUTPATIENT
Start: 2024-05-23 | End: 2024-05-23 | Stop reason: HOSPADM

## 2024-05-23 RX ORDER — EPHEDRINE SULFATE 50 MG/ML
INJECTION INTRAVENOUS AS NEEDED
Status: DISCONTINUED | OUTPATIENT
Start: 2024-05-23 | End: 2024-05-23 | Stop reason: SURG

## 2024-05-23 RX ORDER — SODIUM CHLORIDE 9 MG/ML
40 INJECTION, SOLUTION INTRAVENOUS AS NEEDED
Status: DISCONTINUED | OUTPATIENT
Start: 2024-05-23 | End: 2024-05-23 | Stop reason: HOSPADM

## 2024-05-23 RX ADMIN — HYDROMORPHONE HYDROCHLORIDE 0.5 MG: 1 INJECTION, SOLUTION INTRAMUSCULAR; INTRAVENOUS; SUBCUTANEOUS at 18:20

## 2024-05-23 RX ADMIN — BUPIVACAINE HYDROCHLORIDE 60 ML: 2.5 INJECTION, SOLUTION EPIDURAL; INFILTRATION; INTRACAUDAL; PERINEURAL at 14:39

## 2024-05-23 RX ADMIN — ACETAMINOPHEN 650 MG: 325 TABLET ORAL at 22:51

## 2024-05-23 RX ADMIN — FAMOTIDINE 20 MG: 20 TABLET, FILM COATED ORAL at 13:03

## 2024-05-23 RX ADMIN — Medication 100 MCG: at 15:08

## 2024-05-23 RX ADMIN — SODIUM CHLORIDE, POTASSIUM CHLORIDE, SODIUM LACTATE AND CALCIUM CHLORIDE: 600; 310; 30; 20 INJECTION, SOLUTION INTRAVENOUS at 14:28

## 2024-05-23 RX ADMIN — PROPOFOL 15 MCG/KG/MIN: 10 INJECTION, EMULSION INTRAVENOUS at 14:41

## 2024-05-23 RX ADMIN — Medication 200 MCG: at 15:27

## 2024-05-23 RX ADMIN — SUGAMMADEX 200 MG: 100 INJECTION, SOLUTION INTRAVENOUS at 17:23

## 2024-05-23 RX ADMIN — CELECOXIB 200 MG: 200 CAPSULE ORAL at 12:57

## 2024-05-23 RX ADMIN — ONDANSETRON 4 MG: 2 INJECTION INTRAMUSCULAR; INTRAVENOUS at 19:42

## 2024-05-23 RX ADMIN — Medication 100 MCG: at 14:51

## 2024-05-23 RX ADMIN — FENTANYL CITRATE 100 MCG: 50 INJECTION, SOLUTION INTRAMUSCULAR; INTRAVENOUS at 14:32

## 2024-05-23 RX ADMIN — Medication 100 MCG: at 14:32

## 2024-05-23 RX ADMIN — EPHEDRINE SULFATE 10 MG: 50 INJECTION INTRAVENOUS at 15:18

## 2024-05-23 RX ADMIN — SODIUM CHLORIDE 2000 MG: 900 INJECTION INTRAVENOUS at 14:43

## 2024-05-23 RX ADMIN — PHENYLEPHRINE HYDROCHLORIDE 5 MCG/MIN: 10 INJECTION INTRAVENOUS at 15:42

## 2024-05-23 RX ADMIN — SODIUM CHLORIDE, POTASSIUM CHLORIDE, SODIUM LACTATE AND CALCIUM CHLORIDE 125 ML/HR: 600; 310; 30; 20 INJECTION, SOLUTION INTRAVENOUS at 20:04

## 2024-05-23 RX ADMIN — PREGABALIN 150 MG: 150 CAPSULE ORAL at 12:57

## 2024-05-23 RX ADMIN — ONDANSETRON 4 MG: 2 INJECTION INTRAMUSCULAR; INTRAVENOUS at 17:10

## 2024-05-23 RX ADMIN — ACETAMINOPHEN 1000 MG: 500 TABLET ORAL at 12:57

## 2024-05-23 RX ADMIN — SCOPOLAMINE 1 PATCH: 1.5 PATCH, EXTENDED RELEASE TRANSDERMAL at 12:58

## 2024-05-23 RX ADMIN — LIDOCAINE HYDROCHLORIDE 50 MG: 10 INJECTION, SOLUTION EPIDURAL; INFILTRATION; INTRACAUDAL; PERINEURAL at 14:32

## 2024-05-23 RX ADMIN — MIDAZOLAM HYDROCHLORIDE 1 MG: 1 INJECTION, SOLUTION INTRAMUSCULAR; INTRAVENOUS at 13:32

## 2024-05-23 RX ADMIN — SODIUM CHLORIDE, POTASSIUM CHLORIDE, SODIUM LACTATE AND CALCIUM CHLORIDE 1000 ML: 600; 310; 30; 20 INJECTION, SOLUTION INTRAVENOUS at 13:02

## 2024-05-23 RX ADMIN — LIDOCAINE HYDROCHLORIDE 0.2 ML: 10 INJECTION, SOLUTION EPIDURAL; INFILTRATION; INTRACAUDAL; PERINEURAL at 13:02

## 2024-05-23 RX ADMIN — EPHEDRINE SULFATE 10 MG: 50 INJECTION INTRAVENOUS at 15:27

## 2024-05-23 RX ADMIN — SODIUM CHLORIDE, POTASSIUM CHLORIDE, SODIUM LACTATE AND CALCIUM CHLORIDE 1000 ML: 600; 310; 30; 20 INJECTION, SOLUTION INTRAVENOUS at 18:05

## 2024-05-23 RX ADMIN — PROPOFOL 200 MG: 10 INJECTION, EMULSION INTRAVENOUS at 14:32

## 2024-05-23 RX ADMIN — DEXAMETHASONE SODIUM PHOSPHATE 4 MG: 10 INJECTION, SOLUTION INTRAMUSCULAR; INTRAVENOUS at 14:39

## 2024-05-23 RX ADMIN — OXYCODONE HYDROCHLORIDE 10 MG: 10 TABLET ORAL at 22:53

## 2024-05-23 RX ADMIN — DEXAMETHASONE SODIUM PHOSPHATE 4 MG: 4 INJECTION INTRA-ARTICULAR; INTRALESIONAL; INTRAMUSCULAR; INTRAVENOUS; SOFT TISSUE at 14:43

## 2024-05-23 RX ADMIN — DEXAMETHASONE SODIUM PHOSPHATE 4 MG: 10 INJECTION, SOLUTION INTRAMUSCULAR; INTRAVENOUS at 14:43

## 2024-05-23 NOTE — ANESTHESIA POSTPROCEDURE EVALUATION
Patient: Jie Aragon    Procedure Summary       Date: 05/23/24 Room / Location:  [facility] [address] [place] [address] [facility]    Anesthesia Start: 1428 Anesthesia Stop: 1756    Procedures:       EXPLORATORY LAPAROTOMY, TOTAL ABDOMINAL HYSTERECTOMY BILATERAL SALPINGO OOPHORECTOMY, CANCER STAGING/ OPTIMAL DEBULKLING (R=0) (Abdomen)      CYSTOSCOPY TEMPORARY PLACEMENT BILATERAL URETERAL CATHETER (Bilateral: Urethra)      OMENTECTOMY (Abdomen)      APPENDECTOMY (Abdomen) Diagnosis:       Bilateral tubo-ovarian mass      Pelvic mass      (Bilateral tubo-ovarian mass [N83.8])      (Pelvic mass [R19.00])    Surgeons: Shanda Dubois MD Provider: Julio C Dasilva Jr., MD    Anesthesia Type: general with block ASA Status: 2            Anesthesia Type: general with block    Vitals  Vitals Value Taken Time   BP     Temp     Pulse     Resp     SpO2 100 % 05/23/24 1756           Post Anesthesia Care and Evaluation    Patient location during evaluation: PACU  Patient participation: complete - patient participated  Level of consciousness: awake and alert  Pain management: adequate    Airway patency: patent  Anesthetic complications: No anesthetic complications  PONV Status: none  Cardiovascular status: hemodynamically stable and acceptable  Respiratory status: nonlabored ventilation, acceptable and nasal cannula  Hydration status: acceptable

## 2024-05-23 NOTE — ANESTHESIA PROCEDURE NOTES
"Peripheral Block      Patient reassessed immediately prior to procedure    Patient location during procedure: OR  Start time: 5/23/2024 2:39 PM  Reason for block: at surgeon's request and post-op pain management  Performed by  SUDHA/CAA: Rufus Brewer, TAWANA: Bobby Paredes SRNA  Preanesthetic Checklist  Completed: patient identified, IV checked, site marked, risks and benefits discussed, surgical consent, monitors and equipment checked, pre-op evaluation and timeout performed  Prep:  Pt Position: supine  Sterile barriers:cap, gloves, mask and washed/disinfected hands  Prep: ChloraPrep  Patient monitoring: blood pressure monitoring, continuous pulse oximetry and EKG  Procedure    Sedation: yes  Performed under: general  Guidance:ultrasound guided  Images:still images obtained, printed/placed on chart    Laterality:Bilateral  Block Type:TAP  Injection Technique:single-shot  Needle Type:short-bevel and echogenic  Needle Gauge:20 G  Resistance on Injection: none    Medications Used: dexamethasone sodium phosphate injection - Injection   4 mg - 5/23/2024 2:39:00 PM  bupivacaine PF (MARCAINE) 0.25 % injection - Injection   60 mL - 5/23/2024 2:39:00 PM      Medications  Comment:Block Injection:  LA dose divided between Right and Left block        Post Assessment  Injection Assessment: negative aspiration for heme, incremental injection and no paresthesia on injection  Patient Tolerance:comfortable throughout block  Complications:no  Additional Notes    Subcostal TAPs    A high-frequency linear transducer, with sterile cover, was placed sub-xiphoid to identify Line[place] Alba, right and left Rectus Abdominus Muscles (NORBERTO). The transducer was moved either right or left subcostally to identify the NORBERTO and the Transverse Abdominus Muscle (MAYES). The insertion site was prepped in sterile fashion and then localized with 2-5 ml of 1% Lidocaine. Using ultrasound-guidance, a 20-gauge B-Parra 4\" Ultraplex 360 non-stimulating " "echogenic needle was advanced in plane, from medial to lateral, until the tip of the needle was in the fascial plane between the NORBERTO and MAYES. 1-3ml of preservative free normal saline was used to hydro-dissect the fascial planes. After the fascial plane was verified, the local anesthetic (LA) was injected. The procedure was repeated on the opposite side for bilateral coverage. Aspiration every 5 ml to prevent intravascular injection. Injection was completed with negative aspiration of blood and negative intravascular injection. Injection pressures were normal with minimal resistance. The subcostal approach to the TAP nerve block ideally anesthetizes the intercostal nerves T6-T9.     Mid-Axillary/Lateral TAPs    A high-frequency linear transducer, with sterile cover, was placed in the midaxillary line between the ASIS and costal margin. The External Oblique Muscle (EOM), Internal Oblique Muscle (IOM), Transverse Abdominus Muscle (MAYES), and Peritoneum were identified. The insertion site was prepped in sterile fashion and then localized with 2-5 ml of 1% Lidocaine. Using ultrasound-guidance, a 20-gauge B-Parra 4\" Ultraplex 360 non-stimulating echogenic needle was advanced in plane, from medial to lateral, until the tip of the needle was in the fascial plane between the IOM and MAYES. 1-3ml of preservative free normal saline was used to hydro-dissect the fascial planes. After the fascial plane was verified, the local anesthetic (LA) was injected. The procedure was repeated on the opposite side for bilateral coverage. Aspiration every 5 ml to prevent intravascular injection. Injection was completed with negative aspiration of blood and negative intravascular injection. Injection pressures were normal with minimal resistance. Midaxillary TAPs should reach intercostal nerves T10- T11 and the subcostal nerve T12.              "

## 2024-05-23 NOTE — ANESTHESIA PROCEDURE NOTES
Airway  Urgency: elective    Date/Time: 5/23/2024 2:34 PM  Airway not difficult    General Information and Staff    Patient location during procedure: OR  SRNA: Bobby Paredes SRNA  Indications and Patient Condition  Indications for airway management: airway protection    Preoxygenated: yes  MILS not maintained throughout  Mask difficulty assessment: 1 - vent by mask    Final Airway Details  Final airway type: endotracheal airway      Successful airway: ETT  Cuffed: yes   Successful intubation technique: direct laryngoscopy  Endotracheal tube insertion site: oral  Blade: Freire  Blade size: 2  ETT size (mm): 7.0  Cormack-Lehane Classification: grade I - full view of glottis  Placement verified by: chest auscultation and capnometry   Measured from: lips  ETT/EBT  to lips (cm): 21  Number of attempts at approach: 1  Assessment: lips, teeth, and gum same as pre-op and atraumatic intubation    Additional Comments  Negative epigastric sounds, Breath sound equal bilaterally with symmetric chest rise and fall

## 2024-05-23 NOTE — INTERVAL H&P NOTE
[facility] Pre-op    Full history and physical note from office is attached.    VS: /70  HR 72  RR 16  T 98.2  sat 99%RA    Lungs: Clear to auscultation bilaterally, respirations unlabored  Heart:   Regular rate and rhythm, S1 and S2 normal    LAB Results:  Lab Results   Component Value Date    WBC 10.18 05/21/2024    HGB 13.5 05/21/2024    HCT 41.0 05/21/2024    MCV 89.3 05/21/2024     05/21/2024    NEUTROABS 6.83 05/21/2024    GLUCOSE 90 05/21/2024    BUN 11 05/21/2024    CREATININE 0.83 05/21/2024     05/21/2024    K 4.3 05/21/2024     05/21/2024    CO2 29.0 05/21/2024    CALCIUM 9.8 05/21/2024    ALBUMIN 4.6 05/21/2024    AST 22 05/21/2024    ALT 18 05/21/2024    BILITOT 0.3 05/21/2024       Cancer Staging (if applicable)  Cancer Patient: __ yes __no _X_unknown__N/A; If yes, clinical stage T:__ N:__M:__, stage group or __N/A      Impression: Bilateral tubo-ovarian mass       Plan: EXPLORATORY LAPAROTOMY, TOTAL ABDOMINAL HYSTERECTOMY BILATERAL SALPINGO OOPHORECTOMY, POSSIBLE CANCER STAGING; CYSTOSCOPY TEMPORARY PLACEMENT BILATERAL URETERAL CATHETER       ESTHER Covington   5/23/2024   12:25 EDT    I saw and evaluated the patient. I agree with the findings and the plan of care as documented in the note.    Shanda Dubois MD  05/23/24  13:42 EDT

## 2024-05-23 NOTE — OP NOTE
Subjective     Date of Service:  05/23/24  Time of Service:  17:28 EDT    Surgical Staff: Surgeons and Role:     * Shanda Dubois MD - Primary     * Noemi Brody MD - Resident - Assisting   Additional Staff: Dr. Brody was essential to the success of the case due to her retracting, irrigating, aspirating, suturing, and closing.     Pre-operative diagnosis(es): Pre-Op Diagnosis Codes:     * Bilateral tubo-ovarian mass [N83.8]     * Pelvic mass [R19.00]     Post-operative diagnosis(es): Post-Op Diagnosis Codes:     * Bilateral tubo-ovarian mass [N83.8]     * Pelvic mass [R19.00]   Procedure(s): Procedure(s):  EXPLORATORY LAPAROTOMY, TOTAL ABDOMINAL HYSTERECTOMY BILATERAL SALPINGO OOPHORECTOMY, CANCER STAGING/ OPTIMAL DEBULKLING (R=0)  CYSTOSCOPY TEMPORARY PLACEMENT BILATERAL URETERAL CATHETER  OMENTECTOMY  APPENDECTOMY     Antibiotics: cefazolin (Ancef) ordered on call to OR     Anesthesia: Type: General with Block  ASA:  II     Objective      Operative findings: At the time of cystoscopy, normal bladder mucosa that was somewhat distorted due to mass effect was identified.  Ureteral orifices and urethra were unremarkable bilaterally.    At the time of laparotomy, there is a massively enlarged right ovary that was adhered with filmy adhesions that were bluntly dissected.  It was further adhered to the right pelvis.  Left ovary was large and distorted as well.  Uterus was unremarkable.  There is scant ascites at the beginning of the case.  There is no omental caking, palpably enlarged lymph nodes, or other concerning findings regarding cancer spread.  Liver, diaphragms, and spleen were smooth.  Upper abdomen and stomach were free from cancer.  Both ovarian masses ruptured at the time of surgery.  Mainly fluid was noted although there was some tumor visualized at the right adnexa.  Left adnexa was sent for frozen section with confirmed malignancy.  Clinically this would be a stage II cancer (T2  N0 M0).  No residual cancer is noted to the at the end of the procedure.     Specimens removed: ID Type Source Tests Collected by Time   A (Not marked as sent) :  Tissue Omentum TISSUE PATHOLOGY EXAM Shanda Dubois MD 5/23/2024 1551   B (Not marked as sent) :  Tissue Ovary, Left with Fallopian Tube TISSUE PATHOLOGY EXAM Shanda Dubois MD 5/23/2024 1524   C (Not marked as sent) : BLADDER PERITONEUM Tissue Peritoneum TISSUE PATHOLOGY EXAM Shanda Dubois MD 5/23/2024 1552   D (Not marked as sent) :  Tissue Uterus with Cervix TISSUE PATHOLOGY EXAM Shanda Dubois MD 5/23/2024 1618   E (Not marked as sent) : RIGHT PELVIC PERITONEUM Tissue Peritoneum TISSUE PATHOLOGY EXAM Shanda Dubois MD 5/23/2024 1630   F (Not marked as sent) : RIGHT PELVIC LYMPH NODE Tissue Lymph Node TISSUE PATHOLOGY EXAM Shanda Dubois MD 5/23/2024 1633   G (Not marked as sent) : POSTERIOR CULDUSAC PERITONEUM Tissue Peritoneum TISSUE PATHOLOGY EXAM Shanda Dubois MD 5/23/2024 1634   H (Not marked as sent) : LEFT PELVIS PERITONEUM Tissue Peritoneum TISSUE PATHOLOGY EXAM Shanda Dubois MD 5/23/2024 1636   I (Not marked as sent) : RIGHT PERACOLIC GUTTER PERITONEUM Tissue Peritoneum TISSUE PATHOLOGY EXAM Shanda Dubois MD 5/23/2024 1637   J (Not marked as sent) : LEFT PELVIC LYMPH NODE Tissue Lymph Node TISSUE PATHOLOGY EXAM Shanda Dubois MD 5/23/2024 1644   K (Not marked as sent) :  Tissue Large Intestine, Appendix TISSUE PATHOLOGY EXAM Shanda Dubois MD 5/23/2024 1646   L (Not marked as sent) :  Tissue Ovary, Right with Fallopian Tube TISSUE PATHOLOGY EXAM Shanda Dubois MD 5/23/2024 1713      Fluid Intake and Output: I/O this shift:  In: 100 [IV Piggyback:100]  Out: 500 [Urine:200; Blood:300]   Blood products used: No   Drains: Urethral Catheter 16 Fr. (Active)      Implant Information: Nothing was implanted during the procedure   Complications: No immediate   Intraoperative consult(s):    Condition:  stable   Disposition: to PACU and then admit to  medical - surgical floor       Indications:  Patient's pleasant 48-year-old woman who was noted to have bilateral ovarian masses.  Risks and benefits of procedure were discussed.  Consent was signed and on chart.    Procedure: After obtaining informed consent, patient was taken the operating room and underwent general endotracheal anesthesia after patient site verification.  Regional block was performed by anesthesia team.  Abdomen, perineum, vagina prepped and draped in usual sterile fashion.Cystoscopy was performed with the above findings.  Bilateral 5 Welsh catheters were placed without difficulty under direct visualization.  Yañez catheter was anchored and the ureteral catheters were attached to a sterile glove.  At the completion of the surgery, the intact temporary bilateral ureteral catheters were removed.  Attention was turned to the abdomen.  Vertical midline incision was made and carried from the pubic symphysis to below the umbilicus.  Abdomen was entered without difficulty.  Pelvic washings were obtained and later discarded as the mass ruptured on the field.  Lori clamps were used to grasp the cornua bilaterally.  Peritoneum overlying the left pelvic sidewall was divided with Bovie electrocautery.  Left infundibulopelvic ligament was isolated from surrounding structures and pedicle was created using Enseal and 0 Vicryl ties.  Utero-ovarian ligament was divided using Enseal.  Specimen was sent for frozen section with the above diagnosis.  Attention was turned to the right adnexa.  Filmy adhesions were bluntly dissected.  Peritoneum overlying the right pelvic sidewall were serially divided.  Ureter was identified.  Infundibulopelvic ligament was isolated from surrounding structures and pedicle was created using right angle clamps, Enseal, and 0 Vicryl tie.  Utero-ovarian ligament was divided with Enseal.  Blunt dissection and Bovie electrocautery were used  to free the mass from the right pelvic peritoneum where it was adhered.  Specimen was sent for permanent pathology.  Abdomen was palpated with the above findings.  Bookwalter self-retaining retractor was placed.  Infracolic omentectomy was performed using Bovie electrocautery and Enseal.  Good hemostasis was noted.  Small and large bowel were serially inspected.  No tumor implants were identified.  Attention was turned to the pelvis.  Viscera was packed away from the pelvis using moist laparotomy sponges.  Bladder flap was developed.  Peritoneal biopsies at the bladder, posterior cul-de-sac, bilateral pelvis, and bilateral paracolic gutter peritoneum areas was performed.  Uterine vascular pedicle was divided using greater curved clamps, Enseal, and 0 Vicryl suture.  Straight clamp, Enseal and Bovie electrocautery as well as 0 Vicryl suture was used in alternating fashion until level distal to the cervix was reached.  At that point, greater curved clamps, Bovie electrocautery, and 0 Vicryl suture were used to secure the angles.  Suture was clamped and retained.  Vaginal cuff was closed with 0 Vicryl suture in a running locking fashion.  Additional hemostasis was achieved at the right pelvis using 3-0 Vicryl suture in a figure-of-eight fashion.  Bilateral pelvic lymph node dissection was performed according to the usual anatomic landmarks including the common iliac arteries and their bifurcations, the external and internal iliac arteries, then its genitofemoral and obturator nerves, and the deep circumflex iliac vein.  No suspicious lymph nodes were encountered.  Remainder of common and periaortic lymph node areas were palpated bilaterally without any enlarged or suspicious lymph nodes being identified.  Retractors were adjusted and appendix was elevated using a [place] clamp.  Mesoappendix was divided with Bovie electrocautery.  Hemostats x 2 were placed at the base of the appendix which was divided.  2-0 Vicryl  suture was used to secure the appendiceal stump.  3-0 Vicryl in a pursestring fashion was used to invaginate the appendix.  Surgical field was copiously irrigated and aspirated.  Good hemostasis was noted.  All retractors, laparotomy sponges, and instruments were removed from the abdominal cavity.  Fascia was reapproximated using #1 looped PDS in a bulk closure.  All suprafascial tissue was irrigated, aspirated, and made hemostatic.  Deep dermis was reapproximated using 2-0 Vicryl suture.  3-0 Monocryl was used to close the skin.  Prineo/Exofin dressing was placed.  All counts were correct.  Patient was taken in stable condition to the recovery room.  There were no immediate complications.    Shanda Dubois MD  05/23/24  17:28 EDT

## 2024-05-23 NOTE — ANESTHESIA PREPROCEDURE EVALUATION
Anesthesia Evaluation     Patient summary reviewed and Nursing notes reviewed   NPO Solid Status: > 8 hours  NPO Liquid Status: > 2 hours           Airway   Mallampati: I  TM distance: >3 FB  Neck ROM: full  No difficulty expected  Dental    (+) edentulous, upper dentures and lower dentures    Pulmonary    (+) a smoker Current, cigarettes,  (-) asthma, shortness of breath, recent URI, sleep apnea  Cardiovascular     (-) hypertension, dysrhythmias, angina, cardiac stents      Neuro/Psych  (-) seizures, CVA  GI/Hepatic/Renal/Endo    (-) no renal disease, diabetes, no thyroid disorder    Musculoskeletal     Abdominal    Substance History      OB/GYN    (-)  Pregnant    Comment: CT ; bilateral pelvic masses likely ovarian. Right measures [id number], and left 11x5cm with septations.  is 65      Other        ROS/Med Hx Other: Oxycodone for ABD/ BACK PAIN   LABS wnl               Anesthesia Plan    ASA 2     general with block     (TAPs   Propofol infusion as part of an anti PONV technique  Versed for anxiolysis  )  intravenous induction     Anesthetic plan, risks, benefits, and alternatives have been provided, discussed and informed consent has been obtained with: patient.    Plan discussed with CRNA.    CODE STATUS:

## 2024-05-24 PROBLEM — C56.9 OVARIAN CANCER: Status: ACTIVE | Noted: 2024-05-24

## 2024-05-24 LAB
ANION GAP SERPL CALCULATED.3IONS-SCNC: 10 MMOL/L (ref 5–15)
BASOPHILS # BLD AUTO: 0.02 10*3/MM3 (ref 0–0.2)
BASOPHILS NFR BLD AUTO: 0.2 % (ref 0–1.5)
BUN SERPL-MCNC: 13 MG/DL (ref 6–20)
BUN/CREAT SERPL: 20 (ref 7–25)
CALCIUM SPEC-SCNC: 8.7 MG/DL (ref 8.6–10.5)
CHLORIDE SERPL-SCNC: 102 MMOL/L (ref 98–107)
CO2 SERPL-SCNC: 22 MMOL/L (ref 22–29)
CREAT SERPL-MCNC: 0.65 MG/DL (ref 0.57–1)
DEPRECATED RDW RBC AUTO: 42.1 FL (ref 37–54)
EGFRCR SERPLBLD CKD-EPI 2021: 108.1 ML/MIN/1.73
EOSINOPHIL # BLD AUTO: 0 10*3/MM3 (ref 0–0.4)
EOSINOPHIL NFR BLD AUTO: 0 % (ref 0.3–6.2)
ERYTHROCYTE [DISTWIDTH] IN BLOOD BY AUTOMATED COUNT: 13.4 % (ref 12.3–15.4)
GLUCOSE SERPL-MCNC: 121 MG/DL (ref 65–99)
HCT VFR BLD AUTO: 35.6 % (ref 34–46.6)
HGB BLD-MCNC: 12.1 G/DL (ref 12–15.9)
IMM GRANULOCYTES # BLD AUTO: 0.05 10*3/MM3 (ref 0–0.05)
IMM GRANULOCYTES NFR BLD AUTO: 0.5 % (ref 0–0.5)
LYMPHOCYTES # BLD AUTO: 1.1 10*3/MM3 (ref 0.7–3.1)
LYMPHOCYTES NFR BLD AUTO: 11.4 % (ref 19.6–45.3)
MCH RBC QN AUTO: 29.3 PG (ref 26.6–33)
MCHC RBC AUTO-ENTMCNC: 34 G/DL (ref 31.5–35.7)
MCV RBC AUTO: 86.2 FL (ref 79–97)
MONOCYTES # BLD AUTO: 0.82 10*3/MM3 (ref 0.1–0.9)
MONOCYTES NFR BLD AUTO: 8.5 % (ref 5–12)
NEUTROPHILS NFR BLD AUTO: 7.69 10*3/MM3 (ref 1.7–7)
NEUTROPHILS NFR BLD AUTO: 79.4 % (ref 42.7–76)
NRBC BLD AUTO-RTO: 0 /100 WBC (ref 0–0.2)
PLATELET # BLD AUTO: 321 10*3/MM3 (ref 140–450)
PMV BLD AUTO: 9.5 FL (ref 6–12)
POTASSIUM SERPL-SCNC: 4.6 MMOL/L (ref 3.5–5.2)
RBC # BLD AUTO: 4.13 10*6/MM3 (ref 3.77–5.28)
SODIUM SERPL-SCNC: 134 MMOL/L (ref 136–145)
WBC NRBC COR # BLD AUTO: 9.68 10*3/MM3 (ref 3.4–10.8)

## 2024-05-24 PROCEDURE — 25010000002 ONDANSETRON PER 1 MG: Performed by: STUDENT IN AN ORGANIZED HEALTH CARE EDUCATION/TRAINING PROGRAM

## 2024-05-24 PROCEDURE — 25010000002 HYDROMORPHONE PER 4 MG: Performed by: STUDENT IN AN ORGANIZED HEALTH CARE EDUCATION/TRAINING PROGRAM

## 2024-05-24 PROCEDURE — 25010000002 ENOXAPARIN PER 10 MG: Performed by: OBSTETRICS & GYNECOLOGY

## 2024-05-24 PROCEDURE — 80048 BASIC METABOLIC PNL TOTAL CA: CPT | Performed by: STUDENT IN AN ORGANIZED HEALTH CARE EDUCATION/TRAINING PROGRAM

## 2024-05-24 PROCEDURE — 85025 COMPLETE CBC W/AUTO DIFF WBC: CPT | Performed by: STUDENT IN AN ORGANIZED HEALTH CARE EDUCATION/TRAINING PROGRAM

## 2024-05-24 PROCEDURE — 25010000002 PROCHLORPERAZINE 10 MG/2ML SOLUTION: Performed by: STUDENT IN AN ORGANIZED HEALTH CARE EDUCATION/TRAINING PROGRAM

## 2024-05-24 PROCEDURE — 25810000003 LACTATED RINGERS PER 1000 ML: Performed by: STUDENT IN AN ORGANIZED HEALTH CARE EDUCATION/TRAINING PROGRAM

## 2024-05-24 RX ORDER — POLYETHYLENE GLYCOL 3350 17 G/17G
17 POWDER, FOR SOLUTION ORAL DAILY
Status: DISCONTINUED | OUTPATIENT
Start: 2024-05-24 | End: 2024-05-25 | Stop reason: HOSPADM

## 2024-05-24 RX ORDER — ONDANSETRON 4 MG/1
8 TABLET, ORALLY DISINTEGRATING ORAL EVERY 6 HOURS PRN
Status: DISCONTINUED | OUTPATIENT
Start: 2024-05-24 | End: 2024-05-25 | Stop reason: HOSPADM

## 2024-05-24 RX ORDER — ONDANSETRON 4 MG/1
4 TABLET, ORALLY DISINTEGRATING ORAL EVERY 6 HOURS PRN
Status: DISCONTINUED | OUTPATIENT
Start: 2024-05-24 | End: 2024-05-25 | Stop reason: HOSPADM

## 2024-05-24 RX ORDER — ENOXAPARIN SODIUM 100 MG/ML
40 INJECTION SUBCUTANEOUS DAILY
Status: DISCONTINUED | OUTPATIENT
Start: 2024-05-24 | End: 2024-05-25 | Stop reason: HOSPADM

## 2024-05-24 RX ORDER — ONDANSETRON 2 MG/ML
8 INJECTION INTRAMUSCULAR; INTRAVENOUS EVERY 6 HOURS PRN
Status: DISCONTINUED | OUTPATIENT
Start: 2024-05-24 | End: 2024-05-25 | Stop reason: HOSPADM

## 2024-05-24 RX ORDER — PROCHLORPERAZINE EDISYLATE 5 MG/ML
5 INJECTION INTRAMUSCULAR; INTRAVENOUS EVERY 6 HOURS PRN
Status: DISCONTINUED | OUTPATIENT
Start: 2024-05-24 | End: 2024-05-25 | Stop reason: HOSPADM

## 2024-05-24 RX ADMIN — ACETAMINOPHEN 650 MG: 325 TABLET ORAL at 16:39

## 2024-05-24 RX ADMIN — IBUPROFEN 600 MG: 600 TABLET, FILM COATED ORAL at 23:57

## 2024-05-24 RX ADMIN — SODIUM CHLORIDE, POTASSIUM CHLORIDE, SODIUM LACTATE AND CALCIUM CHLORIDE 125 ML/HR: 600; 310; 30; 20 INJECTION, SOLUTION INTRAVENOUS at 04:10

## 2024-05-24 RX ADMIN — ACETAMINOPHEN 650 MG: 325 TABLET ORAL at 09:35

## 2024-05-24 RX ADMIN — IBUPROFEN 600 MG: 600 TABLET, FILM COATED ORAL at 18:04

## 2024-05-24 RX ADMIN — POLYETHYLENE GLYCOL 3350 17 G: 17 POWDER, FOR SOLUTION ORAL at 09:36

## 2024-05-24 RX ADMIN — IBUPROFEN 600 MG: 600 TABLET, FILM COATED ORAL at 05:44

## 2024-05-24 RX ADMIN — OXYCODONE HYDROCHLORIDE 5 MG: 5 TABLET ORAL at 05:44

## 2024-05-24 RX ADMIN — ENOXAPARIN SODIUM 40 MG: 100 INJECTION SUBCUTANEOUS at 09:35

## 2024-05-24 RX ADMIN — IBUPROFEN 600 MG: 600 TABLET, FILM COATED ORAL at 00:57

## 2024-05-24 RX ADMIN — OXYCODONE HYDROCHLORIDE 5 MG: 5 TABLET ORAL at 18:04

## 2024-05-24 RX ADMIN — NICOTINE 1 PATCH: 7 PATCH, EXTENDED RELEASE TRANSDERMAL at 10:57

## 2024-05-24 RX ADMIN — ACETAMINOPHEN 650 MG: 325 TABLET ORAL at 23:57

## 2024-05-24 RX ADMIN — PROCHLORPERAZINE EDISYLATE 2.5 MG: 5 INJECTION INTRAMUSCULAR; INTRAVENOUS at 00:57

## 2024-05-24 RX ADMIN — IBUPROFEN 600 MG: 600 TABLET, FILM COATED ORAL at 13:50

## 2024-05-24 RX ADMIN — FAMOTIDINE 20 MG: 20 TABLET, FILM COATED ORAL at 09:35

## 2024-05-24 RX ADMIN — OXYCODONE HYDROCHLORIDE 5 MG: 5 TABLET ORAL at 09:35

## 2024-05-24 RX ADMIN — ONDANSETRON 8 MG: 2 INJECTION INTRAMUSCULAR; INTRAVENOUS at 08:11

## 2024-05-24 RX ADMIN — HYDROMORPHONE HYDROCHLORIDE 0.5 MG: 1 INJECTION, SOLUTION INTRAMUSCULAR; INTRAVENOUS; SUBCUTANEOUS at 21:05

## 2024-05-24 NOTE — PLAN OF CARE
Goal Outcome Evaluation:  Plan of Care Reviewed With: patient           Outcome Evaluation: Pt arrived to floor from PACU 2000. Pt very drowsy on arrival. Waking briefly to voice/gentle touch, then falling back asleep. Unable to ambulate from stretcher to bed due to this. Pain moderately controlled with PO medications. Pain control limited due to drowsiness. IV fluids infusing. Yañez catheter in place and patent. Irrigated to maintain patency. ~1L red UOP. Tolerating sips of clear liquids. PRN compazine given x1 for nausea. Attempted to ambulate this AM. Unable to ambulate due to uncontrolled pain. PRN flako given. Abdominal incision C/D/I. Abdomen soft and tender.  at bedside.

## 2024-05-24 NOTE — CASE MANAGEMENT/SOCIAL WORK
Discharge Planning Assessment  [facility]     Patient Name: Jie Aragon  MRN: [id number]  Today's Date: 5/24/2024    Admit Date: 5/23/2024    Plan: Home [facility] [phone] with transporting.   Discharge Needs Assessment       Row Name 05/24/24 1156       Living Environment    People in Home other (see comments)    Name(s) of People in Home MEGAN Boswell [phone]    Primary Care Provided by self    Provides Primary Care For no one    Family Caregiver if Needed other (see comments)    Family Caregiver Names MEGAN Boswell [phone]    Quality of Family Relationships helpful;involved    Able to Return to Prior Arrangements yes       Transition Planning    Transportation Anticipated family or friend will provide  [facility] [phone]       Discharge Needs Assessment    Readmission Within the Last 30 Days no previous admission in last 30 days    Equipment Currently Used at Home none                   Discharge Plan       Row Name 05/24/24 1158       Plan    Plan Home [facility] [phone] with transporting.    Plan Comments SW'er met with patient and MEGAN Boswell [phone] at bedside. Patient lives in [facility]. Functions independently with ADL's, No DME or HH. Patient declined to establish living will at this time. Patients PCP is ESTHER Sanches. Patient has [facility] insurance. Plan is home [facility] [phone] with transporting.    Final Discharge Disposition Code 01 - home or self-care                  Continued Care and Services - Admitted Since 5/23/2024    No active coordination exists for this encounter.          Demographic Summary       Row Name 05/24/24 1153       General Information    Admission Type inpatient    Referral Source admission list    Reason for Consult discharge planning                   Functional Status       Row Name 05/24/24 1156       Functional Status    Usual Activity Tolerance good    Current Activity Tolerance good       Functional  Status, IADL    Medications independent    Meal Preparation independent    Housekeeping independent    Laundry independent    Shopping independent    IADL Comments Functions independently with ADL's, No DME or HH       Employment/    Employment/ Comments PCP is Adela Gtz, ESTHER.                   Psychosocial    No documentation.                  Abuse/Neglect    No documentation.                  Legal    No documentation.                  Substance Abuse    No documentation.                  Patient Forms    No documentation.                     JAVED Desai (Kay)

## 2024-05-24 NOTE — PROGRESS NOTES
Gynecologic Oncology   Daily Progress Note    Chief Complaint: postop    Subjective   Patient did OK overnight.  Her pain is controlled with PO meds.  She is having nausea but improved with meds. Has not vomited.  She is tolerating some sips of water.  Her wakefield catheter is in.  She is not yet ambulating.           Inpatient Medications:     Current Facility-Administered Medications:     acetaminophen (TYLENOL) tablet 650 mg, 650 mg, Oral, Q6H, Noemi Brody MD, 650 mg at 05/23/24 2251    calcium carbonate (TUMS) chewable tablet 500 mg (200 mg elemental), 1 tablet, Oral, TID PRN, Noemi Brody MD    famotidine (PEPCID) tablet 20 mg, 20 mg, Oral, Daily, Noemi Brody MD    HYDROmorphone (DILAUDID) injection 0.5 mg, 0.5 mg, Intravenous, Q2H PRN **AND** naloxone (NARCAN) injection 0.1 mg, 0.1 mg, Intravenous, Q5 Min PRN, Noemi Brody MD    ibuprofen (ADVIL,MOTRIN) tablet 600 mg, 600 mg, Oral, Q6H, Noemi Brody MD, 600 mg at 05/24/24 0544    lactated ringers infusion, 75 mL/hr, Intravenous, Continuous, Noemi Brody MD, Last Rate: 125 mL/hr at 05/24/24 0410, 125 mL/hr at 05/24/24 0410    ondansetron ODT (ZOFRAN-ODT) disintegrating tablet 8 mg, 8 mg, Oral, Q6H PRN **OR** ondansetron (ZOFRAN) 8 mg in sodium chloride 0.9 % 50 mL IVPB, 8 mg, Intravenous, Q6H PRN, Noemi Brody MD    oxyCODONE (ROXICODONE) immediate release tablet 10 mg, 10 mg, Oral, Q4H PRN, Noemi Brody MD, 10 mg at 05/23/24 2253    oxyCODONE (ROXICODONE) immediate release tablet 5 mg, 5 mg, Oral, Q4H PRN, Nicole Brodya, MD, 5 mg at 05/24/24 0544    prochlorperazine (COMPAZINE) injection 5 mg, 5 mg, Intravenous, Q6H PRN, Noemi Brody MD    promethazine (PHENERGAN) suppository 12.5 mg, 12.5 mg, Rectal, Q6H PRN **OR** promethazine (PHENERGAN) tablet 12.5 mg, 12.5 mg, Oral, Q6H PRN, Noemi Brody MD    scopolamine patch 1 mg/72 hr, 1 patch,  Transdermal, Continuous, Shanda Dubois MD, 1 patch at 05/23/24 1258    sennosides-docusate (PERICOLACE) 8.6-50 MG per tablet 2 tablet, 2 tablet, Oral, BID PRN, Noemi Brody MD    simethicone (MYLICON) chewable tablet 80 mg, 80 mg, Oral, 4x Daily PRN, Noemi Brody MD     Objective   Temp:  [97.3 °F (36.3 °C)-98.4 °F (36.9 °C)] 98 °F (36.7 °C)  Heart Rate:  [72-91] 80  Resp:  [15-18] 16  BP: (100-146)/(67-83) 112/67  Vitals:    05/24/24 0738   BP: 112/67   Pulse: 80   Resp: 16   Temp: 98 °F (36.7 °C)   SpO2: 95%     I/O last 3 completed shifts:  In: 3160 [I.V.:2000; Other:60; IV Piggyback:1100]  Out: 1860 [Urine:1560; Blood:300]                 GENERAL: Alert, well-appearing female in no apparent distress.    CARDIOVASCULAR: Normal rate, regular rhythm, no murmurs, rubs, or gallops.    RESPIRATORY: Clear to auscultation bilaterally, normal respiratory effort  GASTROINTESTINAL:  Soft, appropriately tender, non-distended, no rebound or guarding. Quiet bowel sounds.  Incision(s) c/d/i.  GENITOURINARY: Yañez in place, slightly pink urine  SKIN:  Warm, dry, well-perfused.    PSYCHIATRIC: AO x3, with appropriate affect, normal thought processes  EXREMITIES: Symmetric. No peripheral edema.    Lab Results   Component Value Date    WBC 9.68 05/24/2024    HGB 12.1 05/24/2024    HCT 35.6 05/24/2024    MCV 86.2 05/24/2024     05/24/2024    NEUTROABS 7.69 (H) 05/24/2024    GLUCOSE 121 (H) 05/24/2024    BUN 13 05/24/2024    CREATININE 0.65 05/24/2024     (L) 05/24/2024    K 4.6 05/24/2024     05/24/2024    CO2 22.0 05/24/2024    CALCIUM 8.7 05/24/2024         Assessment & Plan   Jie Aragon is a 48 y.o. female POD1 s/p EXPLORATORY LAPAROTOMY, TOTAL ABDOMINAL HYSTERECTOMY BILATERAL SALPINGO OOPHORECTOMY, CANCER STAGING/ OPTIMAL DEBULKLING (R=0)  CYSTOSCOPY TEMPORARY PLACEMENT BILATERAL URETERAL CATHETER  OMENTECTOMY  APPENDECTOMY    1.  Post-operative care  -Routine care  (encourage ambulation, IS use, advance diet as tolerated, saline lock IV, bowel regimen, VTE prophylaxis)  -will lower IVF to 75 from 125; UOP appropriate  -encourage more clears today  -encourage ambulation today       2.  Disposition  -continue postop care          Noemi Brody MD  OBGYN Resident Physician PGY-3     I saw and evaluated the patient. I agree with the findings and the plan of care as documented in the note.  HLIV, D/C ashu.  Advance diet as tolerated  OR discussed    Shanda Dubois MD  05/24/24  08:53 EDT

## 2024-05-25 VITALS
HEART RATE: 70 BPM | SYSTOLIC BLOOD PRESSURE: 93 MMHG | OXYGEN SATURATION: 100 % | RESPIRATION RATE: 18 BRPM | DIASTOLIC BLOOD PRESSURE: 54 MMHG | TEMPERATURE: 98.1 F

## 2024-05-25 PROCEDURE — 25010000002 KETOROLAC TROMETHAMINE PER 15 MG: Performed by: OBSTETRICS & GYNECOLOGY

## 2024-05-25 PROCEDURE — 63710000001 ONDANSETRON ODT 4 MG TABLET DISPERSIBLE: Performed by: OBSTETRICS & GYNECOLOGY

## 2024-05-25 PROCEDURE — 25010000002 ENOXAPARIN PER 10 MG: Performed by: OBSTETRICS & GYNECOLOGY

## 2024-05-25 RX ORDER — IBUPROFEN 600 MG/1
600 TABLET ORAL EVERY 6 HOURS PRN
Status: DISCONTINUED | OUTPATIENT
Start: 2024-05-25 | End: 2024-05-25 | Stop reason: HOSPADM

## 2024-05-25 RX ORDER — KETOROLAC TROMETHAMINE 15 MG/ML
15 INJECTION, SOLUTION INTRAMUSCULAR; INTRAVENOUS ONCE
Status: COMPLETED | OUTPATIENT
Start: 2024-05-25 | End: 2024-05-25

## 2024-05-25 RX ORDER — IBUPROFEN 600 MG/1
600 TABLET ORAL EVERY 6 HOURS PRN
Qty: 30 TABLET | Refills: 1 | Status: SHIPPED | OUTPATIENT
Start: 2024-05-25

## 2024-05-25 RX ORDER — NALOXONE HYDROCHLORIDE 4 MG/.1ML
SPRAY NASAL
Qty: 2 EACH | Refills: 0 | Status: SHIPPED | OUTPATIENT
Start: 2024-05-25

## 2024-05-25 RX ORDER — AMOXICILLIN 250 MG
2 CAPSULE ORAL 2 TIMES DAILY
Status: DISCONTINUED | OUTPATIENT
Start: 2024-05-25 | End: 2024-05-25 | Stop reason: HOSPADM

## 2024-05-25 RX ORDER — DOCUSATE SODIUM 100 MG/1
200 CAPSULE, LIQUID FILLED ORAL 2 TIMES DAILY PRN
Qty: 60 CAPSULE | Refills: 3 | Status: SHIPPED | OUTPATIENT
Start: 2024-05-25

## 2024-05-25 RX ORDER — OXYCODONE HYDROCHLORIDE 5 MG/1
5 TABLET ORAL EVERY 4 HOURS PRN
Qty: 20 TABLET | Refills: 0 | Status: SHIPPED | OUTPATIENT
Start: 2024-05-25 | End: 2024-05-30 | Stop reason: SDUPTHER

## 2024-05-25 RX ORDER — ACETAMINOPHEN 325 MG/1
650 TABLET ORAL EVERY 6 HOURS SCHEDULED
Qty: 30 TABLET | Refills: 1 | Status: SHIPPED | OUTPATIENT
Start: 2024-05-25

## 2024-05-25 RX ORDER — POLYETHYLENE GLYCOL 3350 17 G/17G
17 POWDER, FOR SOLUTION ORAL DAILY
Qty: 510 G | Refills: 0 | Status: SHIPPED | OUTPATIENT
Start: 2024-05-25

## 2024-05-25 RX ADMIN — ONDANSETRON 4 MG: 4 TABLET, ORALLY DISINTEGRATING ORAL at 00:01

## 2024-05-25 RX ADMIN — IBUPROFEN 600 MG: 600 TABLET, FILM COATED ORAL at 05:52

## 2024-05-25 RX ADMIN — KETOROLAC TROMETHAMINE 15 MG: 15 INJECTION, SOLUTION INTRAMUSCULAR; INTRAVENOUS at 14:32

## 2024-05-25 RX ADMIN — OXYCODONE HYDROCHLORIDE 10 MG: 10 TABLET ORAL at 00:01

## 2024-05-25 RX ADMIN — NICOTINE 1 PATCH: 7 PATCH, EXTENDED RELEASE TRANSDERMAL at 10:10

## 2024-05-25 RX ADMIN — OXYCODONE HYDROCHLORIDE 10 MG: 10 TABLET ORAL at 09:56

## 2024-05-25 RX ADMIN — ENOXAPARIN SODIUM 40 MG: 100 INJECTION SUBCUTANEOUS at 09:56

## 2024-05-25 RX ADMIN — POLYETHYLENE GLYCOL 3350 17 G: 17 POWDER, FOR SOLUTION ORAL at 09:56

## 2024-05-25 RX ADMIN — ACETAMINOPHEN 650 MG: 325 TABLET ORAL at 05:52

## 2024-05-25 RX ADMIN — SENNOSIDES AND DOCUSATE SODIUM 2 TABLET: 8.6; 5 TABLET ORAL at 09:55

## 2024-05-25 RX ADMIN — ACETAMINOPHEN 650 MG: 325 TABLET ORAL at 11:57

## 2024-05-25 RX ADMIN — FAMOTIDINE 20 MG: 20 TABLET, FILM COATED ORAL at 09:55

## 2024-05-25 RX ADMIN — OXYCODONE HYDROCHLORIDE 5 MG: 5 TABLET ORAL at 11:57

## 2024-05-25 RX ADMIN — OXYCODONE HYDROCHLORIDE 10 MG: 10 TABLET ORAL at 04:05

## 2024-05-25 NOTE — PROGRESS NOTES
Gynecologic Oncology   Daily Progress Note    Chief Complaint: postop    Subjective   Patient ambulating, ladi po.  Nausea controlled with antiemetic, no emesis.  No flatus.  Voiding well.     Inpatient Medications:     Current Facility-Administered Medications:     acetaminophen (TYLENOL) tablet 650 mg, 650 mg, Oral, Q6H, Noemi Brody MD, 650 mg at 05/25/24 0552    calcium carbonate (TUMS) chewable tablet 500 mg (200 mg elemental), 1 tablet, Oral, TID PRN, Noemi Brody MD    Enoxaparin Sodium (LOVENOX) syringe 40 mg, 40 mg, Subcutaneous, Daily, Shanda Dubois MD, 40 mg at 05/24/24 0935    famotidine (PEPCID) tablet 20 mg, 20 mg, Oral, Daily, Noemi Brody MD, 20 mg at 05/24/24 0935    HYDROmorphone (DILAUDID) injection 0.5 mg, 0.5 mg, Intravenous, Q2H PRN, 0.5 mg at 05/24/24 2105 **AND** naloxone (NARCAN) injection 0.1 mg, 0.1 mg, Intravenous, Q5 Min PRN, Noemi Brody MD    ibuprofen (ADVIL,MOTRIN) tablet 600 mg, 600 mg, Oral, Q6H, Noemi Brody MD, 600 mg at 05/25/24 0552    nicotine (NICODERM CQ) 7 MG/24HR patch 1 patch, 1 patch, Transdermal, Q24H, Noemi Brody MD, 1 patch at 05/24/24 1057    ondansetron ODT (ZOFRAN-ODT) disintegrating tablet 8 mg, 8 mg, Oral, Q6H PRN **OR** ondansetron (ZOFRAN) injection 8 mg, 8 mg, Intravenous, Q6H PRN, Noemi Brody MD, 8 mg at 05/24/24 0811    ondansetron ODT (ZOFRAN-ODT) disintegrating tablet 4 mg, 4 mg, Translingual, Q6H PRN, Shanda Dubois MD, 4 mg at 05/25/24 0001    oxyCODONE (ROXICODONE) immediate release tablet 10 mg, 10 mg, Oral, Q4H PRN, Noemi Brody MD, 10 mg at 05/25/24 0405    oxyCODONE (ROXICODONE) immediate release tablet 5 mg, 5 mg, Oral, Q4H PRN, Noemi Brody MD, 5 mg at 05/24/24 1804    polyethylene glycol (MIRALAX) packet 17 g, 17 g, Oral, Daily, Shanda Dubois MD, 17 g at 05/24/24 0936    prochlorperazine (COMPAZINE) injection 5 mg, 5 mg, Intravenous,  Q6H PRN, Noemi Brody MD    promethazine (PHENERGAN) suppository 12.5 mg, 12.5 mg, Rectal, Q6H PRN **OR** promethazine (PHENERGAN) tablet 12.5 mg, 12.5 mg, Oral, Q6H PRN, Noemi Brody MD    scopolamine patch 1 mg/72 hr, 1 patch, Transdermal, Continuous, Shanda Dubois MD, 1 patch at 05/23/24 1258    sennosides-docusate (PERICOLACE) 8.6-50 MG per tablet 2 tablet, 2 tablet, Oral, BID PRN, Noemi Brody MD    simethicone (MYLICON) chewable tablet 80 mg, 80 mg, Oral, 4x Daily PRN, Noemi Brody MD     Objective   Temp:  [97.1 °F (36.2 °C)-98.1 °F (36.7 °C)] 97.4 °F (36.3 °C)  Heart Rate:  [69-82] 82  Resp:  [16] 16  BP: ()/(49-63) 91/49  Vitals:    05/25/24 0259   BP: 91/49   Pulse: 82   Resp: 16   Temp: 97.4 °F (36.3 °C)   SpO2: 94%     I/O last 3 completed shifts:  In: 2060 [I.V.:1000; Other:60; IV Piggyback:1000]  Out: 2560 [Urine:2560]     GENERAL: Alert, well-appearing female in no apparent distress.    CARDIOVASCULAR: Normal rate, regular rhythm, no murmurs, rubs, or gallops.    RESPIRATORY: Clear to auscultation bilaterally, normal respiratory effort  GASTROINTESTINAL:  Soft, appropriately tender, non-distended, no rebound or guarding. +bowel sounds.  Incision(s) c/d/i.  GENITOURINARY: no [place]  SKIN:  Warm, dry, well-perfused.    PSYCHIATRIC: AO x3, with appropriate affect, normal thought processes  EXREMITIES: Symmetric. No peripheral edema.    Lab Results   Component Value Date    WBC 9.68 05/24/2024    HGB 12.1 05/24/2024    HCT 35.6 05/24/2024    MCV 86.2 05/24/2024     05/24/2024    NEUTROABS 7.69 (H) 05/24/2024    GLUCOSE 121 (H) 05/24/2024    BUN 13 05/24/2024    CREATININE 0.65 05/24/2024     (L) 05/24/2024    K 4.6 05/24/2024     05/24/2024    CO2 22.0 05/24/2024    CALCIUM 8.7 05/24/2024         Assessment & Plan   Jie Aragon is a 48 y.o. female POD2 s/p EXPLORATORY LAPAROTOMY, TOTAL ABDOMINAL HYSTERECTOMY BILATERAL  SALPINGO OOPHORECTOMY, CANCER STAGING/ OPTIMAL DEBULKLING (R=0)  CYSTOSCOPY TEMPORARY PLACEMENT BILATERAL URETERAL CATHETER  OMENTECTOMY  APPENDECTOMY    1.  Post-operative care  -Routine care (encourage ambulation, IS use, advance diet as tolerated, saline lock IV, bowel regimen, VTE prophylaxis)  -Kirk PO      2.  Disposition  -D/C home today after lunch             Shanda Dubois MD  05/25/24  08:53 EDT

## 2024-05-25 NOTE — PLAN OF CARE
Goal Outcome Evaluation:  Plan of Care Reviewed With: patient        Progress: improving  Outcome Evaluation: VSS, on RA. Pain controlled with oral and IV medication. UOP-WNL. Pt slept well. PRN nausea meds given. Family at bedside. Ambulated once in [place]nett. A&OX4. Will continue to monitor.

## 2024-05-25 NOTE — PLAN OF CARE
"Goal Outcome Evaluation:  Plan of Care Reviewed With: patient, spouse        Progress: improving  Outcome Evaluation: Dr. Dubois verified approval of patient's discharge today. Patient explained increased pain is to be expected with patient's increase in ambulation and pain blocks from surgery wearing off. One time toradol 15mg IV ordered and administered, patient verbalized understanding and stated \"I feel comfortable with going home today.\" IV removed intact with no issues noted. Abdominal incision remains clean, dry, and intact with abdominal binder in place. Extra pads and abdominal binder given for supplies, medications delivered via meds to beds. Follow up appointments verified. Discharge instructions explained by RN to patient and patient's partner, both deny any further questions at this time.                               "

## 2024-05-25 NOTE — DISCHARGE SUMMARY
Gynecologic Oncology   [facility]   Discharge Summary    Date of Admission: 5/23/2024    Date of Discharge:  5/25/2024    Admission Diagnoses:    Bilateral tubo-ovarian mass [N83.8]  Pelvic mass [R19.00]  Ovarian cancer [C56.9]  Tobacco abuse    Discharge Diagnoses:   Bilateral ovarian cancer, clinical stage II, final pathology pending  Tobacco abuse    Hospital Course:  Jie Aragon is a 48 y.o. female who presented with bilateral ovarian masses.      On 5/23/2024 she was admitted and underwent EXPLORATORY LAPAROTOMY, TOTAL ABDOMINAL HYSTERECTOMY BILATERAL SALPINGO OOPHORECTOMY, CANCER STAGING/ OPTIMAL DEBULKLING (R=0)  CYSTOSCOPY TEMPORARY PLACEMENT BILATERAL URETERAL CATHETER, OMENTECTOMY, APPENDECTOMY Please refer to dictated operative note for further details.  Post-operatively she did well.  Her diet was advanced.  Nonsurgical medical illnesses were appropriately managed during her hospital stay.  Her POD1 labs showed stable H/H.  Chemistries were unremarkable.  By POD1 she was tolerating a general diet, voiding spontaneously, having her pain controlled with oral medications, and otherwise meeting criteria for discharge and she was discharged home in stable condition.    Discharge Medications:     Discharge Medications        New Medications        Instructions Start Date   acetaminophen 325 MG tablet  Commonly known as: TYLENOL   650 mg, Oral, Every 6 Hours Scheduled      docusate sodium 100 MG capsule  Commonly known as: COLACE   200 mg, Oral, 2 Times Daily PRN, Two capusles      ibuprofen 600 MG tablet  Commonly known as: ADVIL,MOTRIN   600 mg, Oral, Every 6 Hours PRN      naloxone 4 MG/0.1ML nasal spray  Commonly known as: NARCAN   Call 911. Don't prime. [place] in 1 nostril for overdose. Repeat in 2-3 minutes in other nostril if no or minimal breathing/responsiveness.      oxyCODONE 5 MG immediate release tablet  Commonly known as: ROXICODONE   5 mg, Oral, Every 4 Hours PRN       polyethylene glycol 17 GM/SCOOP powder  Commonly known as: MIRALAX   17 g, Oral, Daily      rivaroxaban 10 MG tablet  Commonly known as: Xarelto   10 mg, Oral, Daily             Continue These Medications        Instructions Start Date   ondansetron 4 MG tablet  Commonly known as: ZOFRAN   4 mg, Oral, Every 8 Hours PRN, HAS NOT HAD TO TAKE AS OF 05/21/2024             Stop These Medications      oxyCODONE-acetaminophen 5-325 MG per tablet  Commonly known as: PERCOCET              Discharge Instructions:  She was instructed to call or return to medical attention for fever, severe pain, persistent nausea and vomiting, questions regarding medications, concerns regarding her incisions, excessive vaginal bleeding or discharge, or for any other acute concerns.  She was also instructed not to drive while taking narcotic pain medications, to abide by pelvic rest, avoid tub baths, and to avoid heavy lifting for at least 6 weeks.  Patient was educated regarding constipation prevention.      Condition at Discharge: Stable    Discharge Destination: Home    Results pending at time of discharge: Final surgical pathology    Follow Up: As scheduled    Electronically Signed by: Shanda Dubois MD  Date: 5/25/2024      Time:  12:54 EDT

## 2024-05-26 ENCOUNTER — APPOINTMENT (OUTPATIENT)
Dept: GENERAL RADIOLOGY | Facility: HOSPITAL | Age: 49
End: 2024-05-26
Payer: COMMERCIAL

## 2024-05-26 ENCOUNTER — HOSPITAL ENCOUNTER (EMERGENCY)
Facility: HOSPITAL | Age: 49
Discharge: HOME | End: 2024-05-26
Attending: EMERGENCY MEDICINE | Admitting: STUDENT IN AN ORGANIZED HEALTH CARE EDUCATION/TRAINING PROGRAM
Payer: COMMERCIAL

## 2024-05-26 ENCOUNTER — READMISSION MANAGEMENT (OUTPATIENT)
Dept: CALL CENTER | Facility: HOSPITAL | Age: 49
End: 2024-05-26
Payer: COMMERCIAL

## 2024-05-26 ENCOUNTER — APPOINTMENT (OUTPATIENT)
Dept: CT IMAGING | Facility: HOSPITAL | Age: 49
End: 2024-05-26
Payer: COMMERCIAL

## 2024-05-26 VITALS
RESPIRATION RATE: 16 BRPM | WEIGHT: 150 LBS | SYSTOLIC BLOOD PRESSURE: 102 MMHG | TEMPERATURE: 98.8 F | DIASTOLIC BLOOD PRESSURE: 69 MMHG | HEIGHT: 69 IN | HEART RATE: 78 BPM | OXYGEN SATURATION: 90 % | BODY MASS INDEX: 22.22 KG/M2

## 2024-05-26 DIAGNOSIS — J90 BILATERAL PLEURAL EFFUSION: ICD-10-CM

## 2024-05-26 DIAGNOSIS — R06.00 DYSPNEA, UNSPECIFIED TYPE: Primary | ICD-10-CM

## 2024-05-26 LAB
A-A DO2: 29.9 MMHG (ref 0–300)
ALBUMIN SERPL-MCNC: 3.7 G/DL (ref 3.5–5.2)
ALBUMIN/GLOB SERPL: 1.4 G/DL
ALP SERPL-CCNC: 102 U/L (ref 39–117)
ALT SERPL W P-5'-P-CCNC: 20 U/L (ref 1–33)
ANION GAP SERPL CALCULATED.3IONS-SCNC: 8.7 MMOL/L (ref 5–15)
ARTERIAL PATENCY WRIST A: ABNORMAL
AST SERPL-CCNC: 27 U/L (ref 1–32)
ATMOSPHERIC PRESS: 723 MMHG
B PARAPERT DNA SPEC QL NAA+PROBE: NOT DETECTED
B PERT DNA SPEC QL NAA+PROBE: NOT DETECTED
BASE EXCESS BLDA CALC-SCNC: 2.5 MMOL/L (ref 0–2)
BASOPHILS # BLD AUTO: 0.03 10*3/MM3 (ref 0–0.2)
BASOPHILS NFR BLD AUTO: 0.3 % (ref 0–1.5)
BDY SITE: ABNORMAL
BILIRUB SERPL-MCNC: 0.4 MG/DL (ref 0–1.2)
BUN SERPL-MCNC: 14 MG/DL (ref 6–20)
BUN/CREAT SERPL: 17.7 (ref 7–25)
C PNEUM DNA NPH QL NAA+NON-PROBE: NOT DETECTED
CALCIUM SPEC-SCNC: 9.1 MG/DL (ref 8.6–10.5)
CHLORIDE SERPL-SCNC: 102 MMOL/L (ref 98–107)
CO2 BLDA-SCNC: 28.3 MMOL/L (ref 22–33)
CO2 SERPL-SCNC: 26.3 MMOL/L (ref 22–29)
COHGB MFR BLD: 2.4 % (ref 0–5)
CREAT SERPL-MCNC: 0.79 MG/DL (ref 0.57–1)
CRP SERPL-MCNC: 12.67 MG/DL (ref 0–0.5)
D-LACTATE SERPL-SCNC: 0.7 MMOL/L (ref 0.5–2)
DEPRECATED RDW RBC AUTO: 46.4 FL (ref 37–54)
EGFRCR SERPLBLD CKD-EPI 2021: 91.8 ML/MIN/1.73
EOSINOPHIL # BLD AUTO: 0.26 10*3/MM3 (ref 0–0.4)
EOSINOPHIL NFR BLD AUTO: 2.6 % (ref 0.3–6.2)
ERYTHROCYTE [DISTWIDTH] IN BLOOD BY AUTOMATED COUNT: 13.8 % (ref 12.3–15.4)
FLUAV SUBTYP SPEC NAA+PROBE: NOT DETECTED
FLUBV RNA ISLT QL NAA+PROBE: NOT DETECTED
GEN 5 2HR TROPONIN T REFLEX: <6 NG/L
GLOBULIN UR ELPH-MCNC: 2.7 GM/DL
GLUCOSE SERPL-MCNC: 95 MG/DL (ref 65–99)
HADV DNA SPEC NAA+PROBE: NOT DETECTED
HCO3 BLDA-SCNC: 27 MMOL/L (ref 20–26)
HCOV 229E RNA SPEC QL NAA+PROBE: NOT DETECTED
HCOV HKU1 RNA SPEC QL NAA+PROBE: NOT DETECTED
HCOV NL63 RNA SPEC QL NAA+PROBE: NOT DETECTED
HCOV OC43 RNA SPEC QL NAA+PROBE: NOT DETECTED
HCT VFR BLD AUTO: 32.8 % (ref 34–46.6)
HCT VFR BLD CALC: 33.1 % (ref 38–51)
HGB BLD-MCNC: 10.8 G/DL (ref 12–15.9)
HGB BLDA-MCNC: 10.8 G/DL (ref 13.5–17.5)
HMPV RNA NPH QL NAA+NON-PROBE: NOT DETECTED
HOLD SPECIMEN: NORMAL
HOLD SPECIMEN: NORMAL
HPIV1 RNA ISLT QL NAA+PROBE: NOT DETECTED
HPIV2 RNA SPEC QL NAA+PROBE: NOT DETECTED
HPIV3 RNA NPH QL NAA+PROBE: NOT DETECTED
HPIV4 P GENE NPH QL NAA+PROBE: NOT DETECTED
IMM GRANULOCYTES # BLD AUTO: 0.04 10*3/MM3 (ref 0–0.05)
IMM GRANULOCYTES NFR BLD AUTO: 0.4 % (ref 0–0.5)
INHALED O2 CONCENTRATION: 21 %
LYMPHOCYTES # BLD AUTO: 1.48 10*3/MM3 (ref 0.7–3.1)
LYMPHOCYTES NFR BLD AUTO: 15.1 % (ref 19.6–45.3)
Lab: ABNORMAL
M PNEUMO IGG SER IA-ACNC: NOT DETECTED
MAGNESIUM SERPL-MCNC: 1.8 MG/DL (ref 1.6–2.6)
MCH RBC QN AUTO: 29.9 PG (ref 26.6–33)
MCHC RBC AUTO-ENTMCNC: 32.9 G/DL (ref 31.5–35.7)
MCV RBC AUTO: 90.9 FL (ref 79–97)
METHGB BLD QL: 0.2 % (ref 0–3)
MODALITY: ABNORMAL
MONOCYTES # BLD AUTO: 0.65 10*3/MM3 (ref 0.1–0.9)
MONOCYTES NFR BLD AUTO: 6.6 % (ref 5–12)
NEUTROPHILS NFR BLD AUTO: 7.37 10*3/MM3 (ref 1.7–7)
NEUTROPHILS NFR BLD AUTO: 75 % (ref 42.7–76)
NRBC BLD AUTO-RTO: 0 /100 WBC (ref 0–0.2)
NT-PROBNP SERPL-MCNC: 41.8 PG/ML (ref 0–450)
OXYHGB MFR BLDV: 92.6 % (ref 94–99)
PCO2 BLDA: 40.5 MM HG (ref 35–45)
PCO2 TEMP ADJ BLD: ABNORMAL MM[HG]
PH BLDA: 7.43 PH UNITS (ref 7.35–7.45)
PH, TEMP CORRECTED: ABNORMAL
PLATELET # BLD AUTO: 283 10*3/MM3 (ref 140–450)
PMV BLD AUTO: 9.2 FL (ref 6–12)
PO2 BLDA: 66.4 MM HG (ref 83–108)
PO2 TEMP ADJ BLD: ABNORMAL MM[HG]
POTASSIUM SERPL-SCNC: 3.7 MMOL/L (ref 3.5–5.2)
PROCALCITONIN SERPL-MCNC: 0.09 NG/ML (ref 0–0.25)
PROT SERPL-MCNC: 6.4 G/DL (ref 6–8.5)
QT INTERVAL: 362 MS
QTC INTERVAL: 435 MS
RBC # BLD AUTO: 3.61 10*6/MM3 (ref 3.77–5.28)
RHINOVIRUS RNA SPEC NAA+PROBE: NOT DETECTED
RSV RNA NPH QL NAA+NON-PROBE: NOT DETECTED
SAO2 % BLDCOA: 95.1 % (ref 94–99)
SARS-COV-2 RNA NPH QL NAA+NON-PROBE: NOT DETECTED
SODIUM SERPL-SCNC: 137 MMOL/L (ref 136–145)
TROPONIN T DELTA: NORMAL
TROPONIN T SERPL HS-MCNC: <6 NG/L
TSH SERPL DL<=0.05 MIU/L-ACNC: 3.86 UIU/ML (ref 0.27–4.2)
VENTILATOR MODE: ABNORMAL
WBC NRBC COR # BLD AUTO: 9.83 10*3/MM3 (ref 3.4–10.8)
WHOLE BLOOD HOLD COAG: NORMAL
WHOLE BLOOD HOLD SPECIMEN: NORMAL

## 2024-05-26 PROCEDURE — 84484 ASSAY OF TROPONIN QUANT: CPT | Performed by: EMERGENCY MEDICINE

## 2024-05-26 PROCEDURE — 83050 HGB METHEMOGLOBIN QUAN: CPT

## 2024-05-26 PROCEDURE — 71275 CT ANGIOGRAPHY CHEST: CPT

## 2024-05-26 PROCEDURE — 71275 CT ANGIOGRAPHY CHEST: CPT | Performed by: RADIOLOGY

## 2024-05-26 PROCEDURE — 25510000001 IOPAMIDOL PER 1 ML: Performed by: STUDENT IN AN ORGANIZED HEALTH CARE EDUCATION/TRAINING PROGRAM

## 2024-05-26 PROCEDURE — 71045 X-RAY EXAM CHEST 1 VIEW: CPT | Performed by: RADIOLOGY

## 2024-05-26 PROCEDURE — 94799 UNLISTED PULMONARY SVC/PX: CPT

## 2024-05-26 PROCEDURE — 93010 ELECTROCARDIOGRAM REPORT: CPT | Performed by: INTERNAL MEDICINE

## 2024-05-26 PROCEDURE — 93005 ELECTROCARDIOGRAM TRACING: CPT | Performed by: EMERGENCY MEDICINE

## 2024-05-26 PROCEDURE — 84145 PROCALCITONIN (PCT): CPT | Performed by: EMERGENCY MEDICINE

## 2024-05-26 PROCEDURE — 99285 EMERGENCY DEPT VISIT HI MDM: CPT

## 2024-05-26 PROCEDURE — 36415 COLL VENOUS BLD VENIPUNCTURE: CPT

## 2024-05-26 PROCEDURE — 71045 X-RAY EXAM CHEST 1 VIEW: CPT

## 2024-05-26 PROCEDURE — 80050 GENERAL HEALTH PANEL: CPT | Performed by: EMERGENCY MEDICINE

## 2024-05-26 PROCEDURE — 83880 ASSAY OF NATRIURETIC PEPTIDE: CPT | Performed by: EMERGENCY MEDICINE

## 2024-05-26 PROCEDURE — 0202U NFCT DS 22 TRGT SARS-COV-2: CPT | Performed by: STUDENT IN AN ORGANIZED HEALTH CARE EDUCATION/TRAINING PROGRAM

## 2024-05-26 PROCEDURE — 82375 ASSAY CARBOXYHB QUANT: CPT

## 2024-05-26 PROCEDURE — 36600 WITHDRAWAL OF ARTERIAL BLOOD: CPT

## 2024-05-26 PROCEDURE — 82805 BLOOD GASES W/O2 SATURATION: CPT

## 2024-05-26 PROCEDURE — 87040 BLOOD CULTURE FOR BACTERIA: CPT | Performed by: EMERGENCY MEDICINE

## 2024-05-26 PROCEDURE — 83605 ASSAY OF LACTIC ACID: CPT | Performed by: EMERGENCY MEDICINE

## 2024-05-26 PROCEDURE — 86140 C-REACTIVE PROTEIN: CPT | Performed by: EMERGENCY MEDICINE

## 2024-05-26 PROCEDURE — 83735 ASSAY OF MAGNESIUM: CPT | Performed by: EMERGENCY MEDICINE

## 2024-05-26 RX ORDER — DOXYCYCLINE HYCLATE 100 MG/1
100 CAPSULE ORAL 2 TIMES DAILY
Qty: 14 CAPSULE | Refills: 0 | Status: SHIPPED | OUTPATIENT
Start: 2024-05-26 | End: 2024-06-02

## 2024-05-26 RX ORDER — SODIUM CHLORIDE 0.9 % (FLUSH) 0.9 %
10 SYRINGE (ML) INJECTION AS NEEDED
Status: DISCONTINUED | OUTPATIENT
Start: 2024-05-26 | End: 2024-05-26 | Stop reason: HOSPADM

## 2024-05-26 RX ADMIN — IOPAMIDOL 70 ML: 755 INJECTION, SOLUTION INTRAVENOUS at 07:45

## 2024-05-26 NOTE — ED PROVIDER NOTES
Subjective   History of Present Illness  Patient is 48-year-old female who presents complaining that she awoke from sleep at 4 AM with shortness of breath.  She denies any chest pain.  She denies any history of breathing problems.  She had a hysterectomy on May 23 secondary to ovarian cancer, states just went home from the hospital yesterday; she states that she was discharged to home on Xarelto, states that she had never been on blood thinners before.  She denies other symptoms or other complaints.      Review of Systems   All other systems reviewed and are negative.      Past Medical History:   Diagnosis Date    Abdominal pain     Change in vision     Chills     Cough     Difficulty breathing     Fatigue     Multiple gestation     Nausea     Night sweats     Ovarian cancer 5/24/2024    Poor appetite     Urinary tract infection     Wears dentures     UPPER AND LOWER    Weight gain        Allergies   Allergen Reactions    Amoxicillin Other (See Comments)     unknown    Penicillins Other (See Comments)     unknown       Past Surgical History:   Procedure Laterality Date    APPENDECTOMY N/A 5/23/2024    Procedure: APPENDECTOMY;  Surgeon: Shanda Dubois MD;  Location:  [facility];  Service: Gynecology Oncology;  Laterality: N/A;    CYSTOSCOPY Bilateral 5/23/2024    Procedure: CYSTOSCOPY TEMPORARY PLACEMENT BILATERAL URETERAL CATHETER;  Surgeon: Shanda Dubois MD;  Location:  [facility];  Service: Gynecology Oncology;  Laterality: Bilateral;    EXPLORATORY LAPAROTOMY, TOTAL ABDOMINAL HYSTERECTOMY SALPINGO OOPHORECTOMY N/A 5/23/2024    Procedure: EXPLORATORY LAPAROTOMY, TOTAL ABDOMINAL HYSTERECTOMY BILATERAL SALPINGO OOPHORECTOMY, CANCER STAGING/ OPTIMAL DEBULKLING (R=0);  Surgeon: Shanda Dubois MD;  Location:  [facility];  Service: Gynecology Oncology;  Laterality: N/A;    OMENTECTOMY N/A 5/23/2024    Procedure: OMENTECTOMY;  Surgeon: Shanda Dubois MD;  Location:  [facility];  Service: Gynecology Oncology;   Laterality: N/A;    TUBAL ABDOMINAL LIGATION         Family History   Problem Relation Age of Onset    Stroke Father     Hypertension Father     Heart attack Father     Deep vein thrombosis Mother     Leukemia Brother     Testicular cancer Son        Social History     Socioeconomic History    Marital status: Single   Tobacco Use    Smoking status: Every Day     Current packs/day: 1.00     Average packs/day: 1 pack/day for 15.0 years (15.0 ttl pk-yrs)     Types: Cigarettes     Passive exposure: Current    Smokeless tobacco: Never   Vaping Use    Vaping status: Never Used   Substance and Sexual Activity    Alcohol use: Never    Drug use: Never    Sexual activity: Yes     Partners: Male     Birth control/protection: I.U.D., Tubal ligation           Objective   Physical Exam  Vitals and nursing note reviewed.   Constitutional:       Appearance: Normal appearance. She is well-developed. She is not toxic-appearing or diaphoretic.      Comments: Well-developed well-nourished female, alert and well-oriented, appears anxious and uncomfortable but not in respiratory distress.   HENT:      Head: Normocephalic and atraumatic.   Eyes:      General: No scleral icterus.     Pupils: Pupils are equal, round, and reactive to light.   Neck:      Trachea: No tracheal deviation.   Cardiovascular:      Rate and Rhythm: Normal rate and regular rhythm.   Pulmonary:      Effort: Pulmonary effort is normal. No respiratory distress.      Breath sounds: Normal breath sounds.   Chest:      Chest wall: No tenderness.   Abdominal:      General: Bowel sounds are normal.      Palpations: Abdomen is soft.      Tenderness: There is no abdominal tenderness. There is no guarding or rebound.   Musculoskeletal:         General: No tenderness. Normal range of motion.      Cervical back: Normal range of motion and neck supple. No rigidity or tenderness.      Right lower leg: No tenderness. No edema.      Left lower leg: No tenderness. No edema.   Skin:      General: Skin is warm and dry.      Capillary Refill: Capillary refill takes less than 2 seconds.      Coloration: Skin is not pale.   Neurological:      General: No focal deficit present.      Mental Status: She is alert and oriented to person, place, and time.      GCS: GCS eye subscore is 4. GCS verbal subscore is 5. GCS motor subscore is 6.      Motor: No abnormal muscle tone.   Psychiatric:         Behavior: Behavior normal.         Procedures  EKG at 0613 shows sinus rhythm, rate 87.  ID interval 124, QRS duration 78, QTc 435 ms.  No apparent acute ischemia.  No evidence for STEMI.     CT Angiogram Chest Pulmonary Embolism   Final Result       NEGATIVE FOR PULMONARY EMBOLISM.       LINEAR BIBASILAR ATELECTASIS.       FREE AIR IN THE ABDOMEN IS LIKELY POSTSURGICAL.       This report was finalized on 5/26/2024 8:11 AM by Mayte aMrsh MD.          XR Chest 1 View   Final Result       Development of minimal infiltrates at lung bases..                               [id number]       ZIP Code [id number].                   This report was finalized on 5/26/2024 9:09 AM by Dr. Brian Espino MD.                ED Course  ED Course as of 05/26/24 1016   Sun May 26, 2024   0700 Case discussed with and care endorsed to Dr. Boswell at shift change..hesig   [CM]   0737 Patient care was taken over from Dr. Boyd at shift change.  Patient has remained hemodynamically stable and showed no signs of respiratory distress thus far. CTA has been ordered and pending to rule out possible infection versus PE as she is status post up  Electronically signed by Maria D Boswell DO, 05/26/24, 7:38 AM EDT.   [LK]   0843 CTA revealed no PE -although CT imaging was read as negative personally reviewing imaging shows thickening of bilateral main fissures with small evidence of fluid in the lower lungs bilaterally.  Patient is allergic to amoxicillin.    Patient being postop for metastatic ovarian cancer recommended placing patient on oral antibiotic  therapy    Prior to dc patient is comfortably resting in bed saturations are noted to be 92-93% on room air.  Initial ABG on room air showed PaO2 of 66.4= 95%    -Given slight diminished lymphocytes and elevation of CRP respiratory panel was added on further evaluation of the patient prior to determining discharge disposition.  Electronically signed by Maria D Boswell DO, 05/26/24, 8:44 AM EDT.'    Return precautions were specifically discussed with patient if she developed any type of worsening chest pain shortness of breath she should return to the ER immediately  Electronically signed by Maria D Boswell DO, 05/26/24, 10:05 AM EDT.    [LK]      ED Course User Index  [CM] Julio C Boyd MD  [LK] Maria D Boswell DO                                             Medical Decision Making  Amount and/or Complexity of Data Reviewed  Labs: ordered.  Radiology: ordered.  ECG/medicine tests: ordered.    Risk  OTC drugs.  Prescription drug management.        Final diagnoses:   Dyspnea, unspecified type   Bilateral pleural effusion       ED Disposition  ED Disposition       ED Disposition   Discharge    Condition   Stable    Comment   --               Adela Gtz, APRN  [address] [address]  [phone]               Medication List        New Prescriptions      doxycycline 100 MG capsule  Commonly known as: VIBRAMYCIN  Take 1 capsule by mouth 2 (Two) Times a Day for 7 days.               Where to Get Your Medications        These medications were sent to [facility] - [place], [place] - [address] [facility] - [phone]  - [phone]   [address] [facility] [address]      Phone: [phone]   doxycycline 100 MG capsule            Maria D Boswell DO  05/26/24 1016

## 2024-05-26 NOTE — OUTREACH NOTE
Prep Survey      Flowsheet Row Responses   [facility] facility patient discharged from? [facility]   Is LACE score < 7 ? No   Eligibility Readm Mgmt   Discharge diagnosis Bilateral ovarian cancer, clinical stage II,EXPLORATORY LAPAROTOMY, TOTAL ABDOMINAL HYSTERECTOMY BILATERAL SALPINGO OOPHORECTOMY, CANCER STAGING/ OPTIMAL DEBULKLING (R=0)  CYSTOSCOPY TEMPORARY PLACEMENT BILATERAL URETERAL CATHETER   Does the patient have one of the following disease processes/diagnoses(primary or secondary)? General Surgery   Does the patient have Home health ordered? No   Is there a DME ordered? No   Prep survey completed? Yes            MARLEE GREER - Registered Nurse

## 2024-05-30 ENCOUNTER — TELEPHONE (OUTPATIENT)
Dept: GYNECOLOGIC ONCOLOGY | Facility: CLINIC | Age: 49
End: 2024-05-30

## 2024-05-30 DIAGNOSIS — C56.3 MALIGNANT NEOPLASM OF BOTH OVARIES: ICD-10-CM

## 2024-05-30 NOTE — TELEPHONE ENCOUNTER
Caller: Jie Aragon    Relationship: Self    Best call back number: [phone]    Caller requesting test results: PATIENT     What test was performed: BIOPSY    When was the test performed: 5-23-24    Where was the test performed:

## 2024-05-31 ENCOUNTER — TELEPHONE (OUTPATIENT)
Dept: GYNECOLOGIC ONCOLOGY | Facility: CLINIC | Age: 49
End: 2024-05-31
Payer: COMMERCIAL

## 2024-05-31 ENCOUNTER — READMISSION MANAGEMENT (OUTPATIENT)
Dept: CALL CENTER | Facility: HOSPITAL | Age: 49
End: 2024-05-31
Payer: COMMERCIAL

## 2024-05-31 ENCOUNTER — DOCUMENTATION (OUTPATIENT)
Dept: GYNECOLOGIC ONCOLOGY | Facility: CLINIC | Age: 49
End: 2024-05-31
Payer: COMMERCIAL

## 2024-05-31 LAB
BACTERIA SPEC AEROBE CULT: NORMAL
BACTERIA SPEC AEROBE CULT: NORMAL

## 2024-05-31 RX ORDER — OXYCODONE HYDROCHLORIDE 5 MG/1
5 TABLET ORAL EVERY 6 HOURS PRN
Qty: 20 TABLET | Refills: 0 | Status: SHIPPED | OUTPATIENT
Start: 2024-05-31 | End: 2024-06-07

## 2024-05-31 NOTE — TELEPHONE ENCOUNTER
Called pt to find out what kind of pain she was still having issues with.     Pt stated she was still having issues with pain when coughing.     Spoke with Dr. Dubois and forwarded the script to her to be signed.

## 2024-05-31 NOTE — TELEPHONE ENCOUNTER
Caller: Mahesh Jie Yanelis    Relationship: Self    Best call back number: [phone]    Requested Prescriptions:     OXYCODONE 5MG TABLETS       Pharmacy where request should be sent: [id number] [place] [id number] [place] [phone] FX     Last office visit with prescribing clinician: 5/20/2024   Last telemedicine visit with prescribing clinician: Visit date not found   Next office visit with prescribing clinician: 6/12/2024     Additional details provided by patient:     Does the patient have less than a 3 day supply:  [x] Yes  [] No    Would you like a call back once the refill request has been completed: [] Yes [x] No    If the office needs to give you a call back, can they leave a voicemail: [] Yes [x] No    Virgil Lundberg Rep   05/31/24 11:11 EDT

## 2024-05-31 NOTE — PROGRESS NOTES
Patient called requesting additional narcotics.  These were prescribed.  Ashish reviewed and no other medications of concern have been prescribed by another provider or myself.  For any additional narcotic requests, patient will have to be seen in person at the office.  Electronically signed by Shanda Dubois MD, 05/31/24, 4:58 PM EDT.

## 2024-05-31 NOTE — TELEPHONE ENCOUNTER
RN called and left VM that the results of the biopsy are not available at this time. Keep apt on 6/12 and the provider will review results with you at that time. Patient will be notified sooner if the results are available.

## 2024-05-31 NOTE — OUTREACH NOTE
General Surgery Week 1 Survey      Flowsheet Row Responses   [facility] patient discharged from? [facility]   Does the patient have one of the following disease processes/diagnoses(primary or secondary)? General Surgery   Week 1 attempt successful? Yes   Call start time 1104   Call end time 1112   Discharge diagnosis Bilateral ovarian cancer, clinical stage II,EXPLORATORY LAPAROTOMY, TOTAL ABDOMINAL HYSTERECTOMY BILATERAL SALPINGO OOPHORECTOMY, CANCER STAGING/ OPTIMAL DEBULKLING (R=0)  CYSTOSCOPY TEMPORARY PLACEMENT BILATERAL URETERAL CATHETER   Person spoke with today (if not patient) and relationship Patient   Medication alerts for this patient Tylenol, Colace, Motrin, Narcan, Oxycodone, Miralax, Xarelto   Meds reviewed with patient/caregiver? Yes   Is the patient having any side effects they believe may be caused by any medication additions or changes? No   Does the patient have all medications related to this admission filled (includes all antibiotics, pain medications, etc.) Yes   Is the patient taking all medications as directed (includes completed medication regime)? Yes   Medication comments Patient states she needs a refill of Oxycodone for pain. Advised patient to call Surgeon's office.   Does the patient have a follow up appointment scheduled with their surgeon? Yes   Has the patient kept scheduled appointments due by today? N/A   Comments Gynecologic Oncology Surgery post op f/u appt on 6/12/24 at 2:45 PM with Dr. Shanda Dubois.   Has home health visited the patient within 72 hours of discharge? N/A   Psychosocial issues? No   Comments Patient states she is doing ok, still sore. She has a cough at night. Encouraged to use incentive spirometer if she has it or do deep breathing and coughing while awake, while bracing abdominal incision.   Did the patient receive a copy of their discharge instructions? Yes   Nursing interventions Reviewed instructions with patient   What is the patient's perception of  their health status since discharge? Improving   Nursing interventions Nurse provided patient education   Is the patient /caregiver able to teach back basic post-op care? Continue use of incentive spirometry at least 1 week post discharge, Practice 'cough and deep breath', Drive as instructed by MD in discharge instructions, Take showers only when approved by MD-sponge bathe until then, No tub bath, swimming, or hot tub until instructed by MD, Keep incision areas clean,dry and protected, Do not remove steri-strips, Lifting as instructed by MD in discharge instructions   Is the patient/caregiver able to teach back signs and symptoms of incisional infection? Increased redness, swelling or pain at the incisonal site, Increased drainage or bleeding, Incisional warmth, Pus or odor from incision, Fever   Is the patient/caregiver able to teach back steps to recovery at home? Set small, achievable goals for return to baseline health, Rest and rebuild strength, gradually increase activity   Is the patient/caregiver able to teach back the hierarchy of who to call/visit for symptoms/problems? PCP, Specialist, Home health nurse, Urgent Care, ED, 911 Yes   Additional teach back comments Patient states she is awaiting the path results from her surgery.   Week 1 call completed? Yes   Graduated Yes   Is the patient interested in additional calls from an ambulatory ? No   Would this patient benefit from a Referral to [facility] Social Work? No   Graduated/Revoked comments Patient doing well. No needs or concerns. No questions.   Call end time 1112            Zunilda MCCARTNEY - Registered Nurse

## 2024-05-31 NOTE — TELEPHONE ENCOUNTER
Nothing has changed-- results are still preliminary status. Plan to keep apt on 6/12 as scheduled. Dr Dubois will go over everything in detail at that time/ answer questions--  and certainly one of us will reach out if final results become available before that time.

## 2024-06-04 ENCOUNTER — TELEPHONE (OUTPATIENT)
Dept: GYNECOLOGIC ONCOLOGY | Facility: CLINIC | Age: 49
End: 2024-06-04
Payer: COMMERCIAL

## 2024-06-04 LAB
CYTO UR: NORMAL
LAB AP CASE REPORT: NORMAL
LAB AP CLINICAL INFORMATION: NORMAL
LAB AP DIAGNOSIS COMMENT: NORMAL
LAB AP SPECIAL STAINS: NORMAL
Lab: NORMAL
PATH REPORT.FINAL DX SPEC: NORMAL
PATH REPORT.GROSS SPEC: NORMAL

## 2024-06-04 NOTE — TELEPHONE ENCOUNTER
I called patient to discuss pathology: Left voice message that final pathology confirmed cancer.  Due to high risk nature of this type of cancer, chemotherapy would be recommended.  Did not leave any patient identifiers.  Left office phone number and stated she could call if she wanted to discuss further.  Follow-up as scheduled.  Electronically signed by Shanda Dubois MD, 06/04/24, 12:42 PM EDT.

## 2024-06-05 NOTE — TELEPHONE ENCOUNTER
RN received a call back from patient. RN read to patient the note from MD concerning her pathology showing cancer and the recommendation for chemotherapy. Patient verbalized understanding. Patient has an appointment for 6/12 with Dr. Dubois and plans to review the pathology and plan then.

## 2024-06-10 NOTE — PROGRESS NOTES
"Jie Aragon  [id number]  1975      Reason for Visit:   Treatment planning for newly diagnosed ovarian cancer, Postoperative evaluation    History of Present Illness:  Patient is a very pleasant 48 y.o. woman who presents for a post operative evaluation status post EXPLORATORY LAPAROTOMY, TOTAL ABDOMINAL HYSTERECTOMY BILATERAL SALPINGO OOPHORECTOMY, CANCER STAGING/ OPTIMAL DEBULKLING, CYSTOSCOPY, APPENDECTOMY, OMENTECTOMY performed on 5/23.      Surgery and hospital course were uncomplicated.  Today, patient notes normal bowel and bladder function.  Her pain is well controlled. She has questions about resuming normal activities.     Past Medical History, Past Surgical History, Social History, Family History have been reviewed and are without significant changes except as mentioned.    Review of Systems   All other systems were reviewed and are negative except as mentioned above.    Medications:  The current medication list was reviewed in the EMR    ALLERGIES:    Allergies   Allergen Reactions    Amoxicillin Other (See Comments)     unknown    Penicillins Other (See Comments)     unknown         Resp 17   Ht 175.3 cm (69.02\")   Wt 63.5 kg (139 lb 14.4 oz)   BMI 20.65 kg/m²   ECOG score: 0       Physical Exam  Constitutional:  Patient is a pleasant woman in no acute distress.  Gastrointestinal: Abdomen is soft and appropriately tender.  There is no mass palpated.  There is no rebound or guarding.    Incision(s) is clean, dry and intact.  Extremities:  Bilateral lower extremities are non-tender.  Gynecologic:External genitalia are free from lesion. On speculum examination, the vaginal cuff was intact and no lesions were appreciated.  On bimanual examination, no fullness was appreciated.  Uterus, cervix and adnexa were absent.  There was no significant tenderness.  Rectovaginal exam was deferred.      PATHOLOGY:  Final Diagnosis   1.  LEFT OVARY AND FALLOPIAN TUBE, SALPINGO-OOPHORECTOMY:  High-grade " serous carcinoma, approximately 9.5 cm in maximum dimension.  Tumor confined to the ovary with no surface involvement identified.  See comment and tumor synoptic for additional details.     2.  OMENTUM, OMENTECTOMY:  Negative for malignancy.     3.  BLADDER PERITONEUM BIOPSY:  Focal hemorrhage and chronic inflammation with no tumor identified.     4.  UTERUS AND CERVIX, HYSTERECTOMY:  Cervix: High-grade squamous intraepithelial lesion (severe squamous dysplasia/ALEX-3) with endocervical glandular extension and extending into the lower uterine segment.  No invasive carcinoma identified.  Endometrium: Proliferative pattern endometrium.  Myometrium: Leiomyoma, 7 mm maximum dimension.  Intrauterine device present.     5.  RIGHT PELVIC PERITONEUM BIOPSY:  Peritoneum with reactive changes including chronic inflammation and vascular proliferation with no tumor identified.     6.  LYMPH NODE, RIGHT PELVIC, EXCISION:  1 lymph node negative for metastatic carcinoma (0/1).     7.  POSTERIOR CUL-DE-SAC PERITONEUM BIOPSY:  Benign peritoneum with no tumor identified.     8.  LEFT PELVIC PERITONEUM:  Peritoneum with nonspecific reactive changes with no tumor identified.     9.  RIGHT PARACOLIC GUTTER PERITONEUM:  Benign peritoneum with no tumor identified.     10.  LYMPH NODE, LEFT PELVIC, EXCISION:  1 lymph node negative for metastatic carcinoma (0/1).     11.  APPENDIX, APPENDECTOMY:  Appendix with partial fibrous liberation of the lumen otherwise no significant histopathologic change.  No tumor seen.     12.  RIGHT OVARY AND FALLOPIAN TUBE, SALPINGO-OOPHORECTOMY:  Involved by high-grade serous carcinoma, confined to the ovary.  Fallopian tube with no significant histopathologic change.       ASSESSMENT/PLAN:  Jie Aragon returns for a post-operative evaluation today.  All pathology reports were discussed with the patient.      Overall, the patient is very pleased with her care.  I recommended continuation of post  operative precautions as discussed. High grade serous carcinoma, Stg Ic placed on findings at the time of surgery. Discussed recommendation for chemotherapy with carboplatin/paclitaxel for 6 cycles.  Communicating with pathology to get the status changed on the pathology report.  We reviewed the inherent side effects of Carboplatin/Paclitaxel chemotherapy, including but not limited to: bone marrow suppression, peripheral neuropathy, fatigue, alopecia, constipation, and less commonly nausea/vomiting, allergic reaction, extravasation.   Patient watched genetic education video but did not undergo blood draw today.  1 could consider tumor genomics as patient is at some small risk for recurrence and this could be helpful in the future.  However, this would not impact her care at this point as maintenance therapy is not indicated.    She was referred to oncology in [place] as this is much closer to home for her.  She should undergo survivorship here about 3 months after completion of treatment.    Noemi Lainez MD  06/12/24  09:25 EDT    I spent 20 minutes caring for Jie on this date of service. This time includes time spent by me in the following activities: preparing for the visit, reviewing tests, performing a medically appropriate examination and/or evaluation, counseling and educating the patient/family/caregiver, ordering medications, tests, or procedures, referring and communicating with other health care professionals, documenting information in the medical record, and care coordination  This was outside of routine postoperative gynecologic care and involved discussions of pathology, communications with pathology, referral to medical oncology in [place], counseling regarding need for genetic testing and chemotherapy.    Patient was seen and examined with Dr. Brody,  resident, who performed portions of the examination and documentation for this patient's care under my direct supervision.  I  agree with the above documentation and plan.    Shanda Dubois MD  06/13/24  12:18 EDT

## 2024-06-12 ENCOUNTER — OFFICE VISIT (OUTPATIENT)
Dept: GYNECOLOGIC ONCOLOGY | Facility: CLINIC | Age: 49
End: 2024-06-12
Payer: COMMERCIAL

## 2024-06-12 ENCOUNTER — DOCUMENTATION (OUTPATIENT)
Dept: GENETICS | Facility: HOSPITAL | Age: 49
End: 2024-06-12
Payer: COMMERCIAL

## 2024-06-12 VITALS
SYSTOLIC BLOOD PRESSURE: 94 MMHG | HEIGHT: 69 IN | DIASTOLIC BLOOD PRESSURE: 54 MMHG | HEART RATE: 87 BPM | WEIGHT: 139.9 LBS | RESPIRATION RATE: 17 BRPM | TEMPERATURE: 97.7 F | BODY MASS INDEX: 20.72 KG/M2 | OXYGEN SATURATION: 90 %

## 2024-06-12 DIAGNOSIS — C56.3 MALIGNANT NEOPLASM OF BOTH OVARIES: Primary | ICD-10-CM

## 2024-06-13 ENCOUNTER — TELEPHONE (OUTPATIENT)
Dept: GYNECOLOGIC ONCOLOGY | Facility: CLINIC | Age: 49
End: 2024-06-13
Payer: COMMERCIAL

## 2024-06-13 NOTE — TELEPHONE ENCOUNTER
Caller: [facility]    Relationship: Other    Best call back number: [phone]    What is the best time to reach you: ANYTIME    Who are you requesting to speak with (clinical staff, provider,  specific staff member): CLINICAL    What was the call regarding: PATIENT MENTIONED THAT SHE WAS SUPPOSED TO HAVE LABS DRAWN YESTERDAY WHEN SHE WAS IN OUR OFFICE BUT SHE FORGOT. HER PCP OFFICE CAN DO THE LABS, THEY JUST NEED THE ORDER.     PLEASE FAX [phone].

## 2024-06-13 NOTE — PROGRESS NOTES
Patient was recently diagnosed with ovarian cancer. Due to this diagnosis, the patient meets NCCN criteria for genetic testing. During their GYN ONC appointment today, Dr. Dubois offered the patient testing through the Rapid Access Genetic Testing Program. This program allows ovarian cancer patients to bypass an initial pre-test genetic counseling visit and proceed directly into testing. The patient viewed a pre-test educational video. A blood sample was unable to be collected and sent to [facility]. This will be drawn on Tuesday, 6/18, at the [facility]. Results are expected in 2-3 weeks and the genetic test results will be disclosed to the patient by the genetic counseling team. Results will be scanned into Epic, and routed to the patient’s provider.

## 2024-06-13 NOTE — TELEPHONE ENCOUNTER
Called Chanell to let her know that our Genetics team reached out to [facility] and pt will be having genetics labs drawn at her next appt in [place]

## 2024-06-18 NOTE — PROGRESS NOTES
Subjective     Date: 6/21/2024    Referring Provider  Shanda Dubois MD    Chief Complaint  Malignant neoplasm of both ovaries      Cancer Staging   FIGO Stage II (cT2, cN0, cM0)  Stage IC (pT1c2, pN0, cM0)      Subjective          Jie Aragon is a 48 y.o. female who presents today to [facility] for initial evaluation .    HPI:   48 y.o. 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     Oncology history:  April 2024: Palpated right lower abdominal mass while applying lotion in tanning salon. This was associated with abdominal pain, bloating, and nausea. Care was delayed because patient did not have insurance at the time. Eventually went to PCP who ordered CT imaging of the abdomen.  May 8 2024: CT imaging R mass 17 x 13 cm and left 11x5 cm with septations, likely ovarian.  65  May 20 2024: Seen by Dr. Dubois for consultation.   May 23 2024: Underwent exploratory laparotomy, total abdominal hysterectomy, bilateral salpingo oophorectomy, omentectomy, appendectomy. Final pathology with left ovary with high grade serous carcinoma 9.5 cm confined to the ovary with no surface involvement identified, Right ovary with involved by high grade serous carcinoma, confined to the ovary. Fallopian tube with no change. Lymph nodes and omentum negative for malignancy. Uterus with high grade squamous intraepithelial lesion ALEX-3.   Final pathology pT1c and pN0 (ovarian ruptured at time of surgery).     She presents today with her daughter in law, Donna. Ms. Aragon is healing well without any post op complications. She is a smoker, smokes 1 pack/day X 30+ years, denies alcohol or drug use. Has family history of paternal grandfather with brain tumor, half brother with leukemia, and son with testicular cancer. She has three children 2 daughters and 1 son ranging from  26-31.       The following portions of the patient's history were reviewed and updated as appropriate: allergies, current medications, past family history, past medical history, past social history, past surgical history and problem list.    Objective     Objective     Allergy:   Allergies   Allergen Reactions    Amoxicillin Other (See Comments)     unknown    Penicillins Other (See Comments)     unknown        Current Medications:   Current Outpatient Medications   Medication Sig Dispense Refill    acetaminophen (TYLENOL) 325 MG tablet Take 2 tablets by mouth Every 6 (Six) Hours. 30 tablet 1    docusate sodium (COLACE) 100 MG capsule Take 2 capsules by mouth 2 (Two) Times a Day As Needed for Constipation. Two capusles 60 capsule 3    ibuprofen (ADVIL,MOTRIN) 600 MG tablet Take 1 tablet by mouth Every 6 (Six) Hours As Needed for Mild Pain. 30 tablet 1    naloxone (NARCAN) 4 MG/0.1ML nasal spray Call 911. Don't prime. [place] in 1 nostril for overdose. Repeat in 2-3 minutes in other nostril if no or minimal breathing/responsiveness. 2 each 0    ondansetron (ZOFRAN) 4 MG tablet Take 1 tablet by mouth Every 8 (Eight) Hours As Needed for Nausea or Vomiting. HAS NOT HAD TO TAKE AS OF 05/21/2024      oxyCODONE (OXY-IR) 5 MG capsule Take 1 capsule by mouth As Needed for Moderate Pain.      polyethylene glycol (MIRALAX) 17 GM/SCOOP powder Take 17 g by mouth Daily. 510 g 0    rivaroxaban (Xarelto) 10 MG tablet Take 1 tablet by mouth Daily. 30 tablet 0     No current facility-administered medications for this visit.       Past Medical History:  Past Medical History:   Diagnosis Date    Abdominal pain     Change in vision     Chills     Cough     Difficulty breathing     Fatigue     Multiple gestation     Nausea     Night sweats     Ovarian cancer 5/24/2024    Poor appetite     Urinary tract infection     Wears dentures     UPPER AND LOWER    Weight gain        Past Surgical History:  Past Surgical History:   Procedure  Laterality Date    APPENDECTOMY N/A 5/23/2024    Procedure: APPENDECTOMY;  Surgeon: Shanda Dubois MD;  Location:  [facility];  Service: Gynecology Oncology;  Laterality: N/A;    CYSTOSCOPY Bilateral 5/23/2024    Procedure: CYSTOSCOPY TEMPORARY PLACEMENT BILATERAL URETERAL CATHETER;  Surgeon: Shanda Dubois MD;  Location:  [facility];  Service: Gynecology Oncology;  Laterality: Bilateral;    EXPLORATORY LAPAROTOMY, TOTAL ABDOMINAL HYSTERECTOMY SALPINGO OOPHORECTOMY N/A 5/23/2024    Procedure: EXPLORATORY LAPAROTOMY, TOTAL ABDOMINAL HYSTERECTOMY BILATERAL SALPINGO OOPHORECTOMY, CANCER STAGING/ OPTIMAL DEBULKLING (R=0);  Surgeon: Shanda Dubois MD;  Location:  [facility];  Service: Gynecology Oncology;  Laterality: N/A;    OMENTECTOMY N/A 5/23/2024    Procedure: OMENTECTOMY;  Surgeon: Shanda Dubois MD;  Location:  [facility];  Service: Gynecology Oncology;  Laterality: N/A;    TUBAL ABDOMINAL LIGATION         Social History:  Social History     Socioeconomic History    Marital status: Single   Tobacco Use    Smoking status: Every Day     Current packs/day: 1.00     Average packs/day: 1 pack/day for 15.0 years (15.0 ttl pk-yrs)     Types: Cigarettes     Passive exposure: Current    Smokeless tobacco: Never   Vaping Use    Vaping status: Never Used   Substance and Sexual Activity    Alcohol use: Never    Drug use: Never    Sexual activity: Yes     Partners: Male     Birth control/protection: I.U.D., Tubal ligation     Jie Aragon  reports that she has been smoking cigarettes. She has a 15 pack-year smoking history. She has been exposed to tobacco smoke. She has never used smokeless tobacco. I have educated her on the risk of diseases from using tobacco products such as cancer, COPD, and heart disease.     I advised her to quit and she is not willing to quit.    I spent 3  minutes counseling the patient.         Family History:  Family History   Problem Relation Age of Onset    Stroke Father      "Hypertension Father     Heart attack Father     Deep vein thrombosis Mother     Leukemia Brother     Testicular cancer Son        Review of Systems:  Review of Systems    Vital Signs:   /75   Pulse 91   Temp 97.1 °F (36.2 °C) (Temporal)   Resp 18   Ht 175.3 cm (69\")   Wt 64.4 kg (142 lb)   BMI 20.97 kg/m²      Physical Exam:  Physical Exam    PHQ-9 Score  PHQ-9 Total Score: 0     Pain Score  Vitals:    06/21/24 1054   BP: 102/75   Pulse: 91   Resp: 18   Temp: 97.1 °F (36.2 °C)   TempSrc: Temporal   Weight: 64.4 kg (142 lb)   Height: 175.3 cm (69\")   PainSc: 0-No pain       ECOG score: 0           PAINSCOREQUALITYMETRIC:   Vitals:    06/21/24 1054   PainSc: 0-No pain          Lab Review  Lab Results   Component Value Date    WBC 9.99 06/21/2024    HGB 13.4 06/21/2024    HCT 40.5 06/21/2024    MCV 91.2 06/21/2024    RDW 14.3 06/21/2024     06/21/2024    NEUTRORELPCT 69.2 06/21/2024    LYMPHORELPCT 23.8 06/21/2024    MONORELPCT 5.1 06/21/2024    EOSRELPCT 1.1 06/21/2024    BASORELPCT 0.3 06/21/2024    NEUTROABS 6.91 06/21/2024    LYMPHSABS 2.38 06/21/2024     Lab Results   Component Value Date     06/21/2024    K 4.6 06/21/2024    CO2 27.7 06/21/2024     06/21/2024    BUN 6 06/21/2024    CREATININE 0.77 06/21/2024    GLUCOSE 138 (H) 06/21/2024    CALCIUM 10.3 06/21/2024    ALKPHOS 94 06/21/2024    AST 17 06/21/2024    ALT 12 06/21/2024    BILITOT 0.3 06/21/2024    ALBUMIN 4.5 06/21/2024    PROTEINTOT 7.8 06/21/2024    MG 1.8 05/26/2024         Radiology Results  CT Angiogram Chest Pulmonary Embolism (05/26/2024 07:45)   FINDINGS:  Contrast bolus timing is satisfactory to evaluate for pulmonary  embolism.  Pulmonary arteries: normal.  No pulmonary arterial filling defect is  seen.  Heart and pericardium: normal.  Aorta: normal.  Esophagus: normal.  Lungs and airways: Linear bibasilar atelectasis.  Peripheral emphysema  and cysts..  Pleura: normal.  Lymph nodes: normal.  Musculoskeletal and " chest wall: Free air in the abdomen is postsurgical  most likely  Other: n/a     IMPRESSION:  NEGATIVE FOR PULMONARY EMBOLISM.  LINEAR BIBASILAR ATELECTASIS.  FREE AIR IN THE ABDOMEN IS LIKELY POSTSURGICAL    No acute cardiopulmonary process.    Scanned Imaging   CT Abdomen/Pelvis with Contrast (05/08/2024)           Pathology:   05/23/2024                    Assessment / Plan         Assessment and Plan   Jie Aragon is a 48 y.o. 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 PD-L1 <1%   Cancer Staging   FIGO Stage II (cT2, cN0, cM0)  Stage IC (pT1c2, pN0, cM0)  -patient presents with palpation of RLQ abdominal mass, associated with abdominal pain, bloating, and nausea for ~1 months prior to CT A/P. CT A/P with R mass 17 cm and L mass 11 cm c/f ovarian cancer. : 65  -5/2024 Underwent exploratory laparotomy, total abdominal hysterectomy, bilateral salpingo oophorectomy, omentectomy, appendectomy by Dr. Dubois at [facility]. Final pathology with left ovary with high grade serous carcinoma 9.5 cm confined to the ovary with no surface involvement identified, Right ovary with involved by high grade serous carcinoma, confined to the ovary. Fallopian tube with no change. Lymph nodes and omentum negative for malignancy. Uterus with high grade squamous intraepithelial lesion ALEX-3.   Final pathology pT1c and pN0 (ovarian ruptured at time of surgery).   -We reviewed the NCCN guidelines which indicate adjuvant chemotherapy with carboplatin AUC 5-6 D1 and paclitaxel 175 mg/m2 D1 every 21 days for 6 cycles. Adverse effects were reviewed such as fatigue, nausea, vomiting, diarrhea, alopecia, peripheral neuropathy, myelosuppression. She is agreeable to the plan and treatment above. ChemoExpert handout provided.  -She requests port placement, will meet with General surgery today for consultation with port placement.   -Genetic testing obtained today    2. Malignancy associated pain  -Currently  denies     3. Nutrition   - Recommend high calories/protein diet during the treatment       Discussed possible differential diagnoses, testing, treatment, recommended non-pharmacological interventions, risks, warning signs to monitor for that would indicate need for follow-up in clinic or ER. If no improvement with these regimens or you have new or worsening symptoms follow-up. Patient verbalizes understanding and agreement with plan of care. Denies further needs or concerns.     Patient was given instructions and counseling regarding her condition and for health maintenance advised.       All questions were answered to her satisfaction.    BMI is within normal parameters. No other follow-up for BMI required.         ACO / ALEX/Other  Quality measures  -  Jie Aragon did not receive 2023 flu vaccine.  This was recommended.  -  Jie Aragon reports a pain score of 0.  Given her pain assessment as noted, treatment options were discussed and the following options were decided upon as a follow-up plan to address the patient's pain: continuation of current treatment plan for pain.  -  Current outpatient and discharge medications have been reconciled for the patient.  Reviewed by: Ros Vasquez MD        Meds ordered during this visit  No orders of the defined types were placed in this encounter.      Visit Diagnoses    ICD-10-CM ICD-9-CM   1. Malignant neoplasm of both ovaries  C56.3 183.0       Follow Up   Prior to C2 of carbo/taxol         This document has been electronically signed by Ros Vasquez MD   June 21, 2024 12:45 EDT    Dictated Utilizing Dragon Dictation: Part of this note may be an electronic transcription/translation of spoken language to printed text using the Dragon Dictation System.

## 2024-06-21 ENCOUNTER — CONSULT (OUTPATIENT)
Dept: ONCOLOGY | Facility: CLINIC | Age: 49
End: 2024-06-21
Payer: COMMERCIAL

## 2024-06-21 ENCOUNTER — OFFICE VISIT (OUTPATIENT)
Dept: SURGERY | Facility: CLINIC | Age: 49
End: 2024-06-21
Payer: COMMERCIAL

## 2024-06-21 ENCOUNTER — LAB (OUTPATIENT)
Dept: ONCOLOGY | Facility: CLINIC | Age: 49
End: 2024-06-21
Payer: COMMERCIAL

## 2024-06-21 VITALS
TEMPERATURE: 97.1 F | HEART RATE: 91 BPM | RESPIRATION RATE: 18 BRPM | SYSTOLIC BLOOD PRESSURE: 102 MMHG | WEIGHT: 142 LBS | DIASTOLIC BLOOD PRESSURE: 75 MMHG | BODY MASS INDEX: 21.03 KG/M2 | HEIGHT: 69 IN

## 2024-06-21 VITALS
HEIGHT: 69 IN | SYSTOLIC BLOOD PRESSURE: 102 MMHG | WEIGHT: 142 LBS | BODY MASS INDEX: 21.03 KG/M2 | DIASTOLIC BLOOD PRESSURE: 75 MMHG

## 2024-06-21 DIAGNOSIS — N83.8 BILATERAL TUBO-OVARIAN MASS: ICD-10-CM

## 2024-06-21 DIAGNOSIS — C56.3 MALIGNANT NEOPLASM OF BOTH OVARIES: ICD-10-CM

## 2024-06-21 DIAGNOSIS — C56.3 MALIGNANT NEOPLASM OF BOTH OVARIES: Primary | ICD-10-CM

## 2024-06-21 DIAGNOSIS — R19.00 PELVIC MASS: ICD-10-CM

## 2024-06-21 LAB
ALBUMIN SERPL-MCNC: 4.5 G/DL (ref 3.5–5.2)
ALBUMIN/GLOB SERPL: 1.4 G/DL
ALP SERPL-CCNC: 94 U/L (ref 39–117)
ALT SERPL W P-5'-P-CCNC: 12 U/L (ref 1–33)
ANION GAP SERPL CALCULATED.3IONS-SCNC: 10.3 MMOL/L (ref 5–15)
AST SERPL-CCNC: 17 U/L (ref 1–32)
BASOPHILS # BLD AUTO: 0.03 10*3/MM3 (ref 0–0.2)
BASOPHILS NFR BLD AUTO: 0.3 % (ref 0–1.5)
BILIRUB SERPL-MCNC: 0.3 MG/DL (ref 0–1.2)
BUN SERPL-MCNC: 6 MG/DL (ref 6–20)
BUN/CREAT SERPL: 7.8 (ref 7–25)
CALCIUM SPEC-SCNC: 10.3 MG/DL (ref 8.6–10.5)
CHLORIDE SERPL-SCNC: 104 MMOL/L (ref 98–107)
CO2 SERPL-SCNC: 27.7 MMOL/L (ref 22–29)
CREAT SERPL-MCNC: 0.77 MG/DL (ref 0.57–1)
DEPRECATED RDW RBC AUTO: 47.8 FL (ref 37–54)
EGFRCR SERPLBLD CKD-EPI 2021: 94.7 ML/MIN/1.73
EOSINOPHIL # BLD AUTO: 0.11 10*3/MM3 (ref 0–0.4)
EOSINOPHIL NFR BLD AUTO: 1.1 % (ref 0.3–6.2)
ERYTHROCYTE [DISTWIDTH] IN BLOOD BY AUTOMATED COUNT: 14.3 % (ref 12.3–15.4)
GLOBULIN UR ELPH-MCNC: 3.3 GM/DL
GLUCOSE SERPL-MCNC: 138 MG/DL (ref 65–99)
HCT VFR BLD AUTO: 40.5 % (ref 34–46.6)
HGB BLD-MCNC: 13.4 G/DL (ref 12–15.9)
IMM GRANULOCYTES # BLD AUTO: 0.05 10*3/MM3 (ref 0–0.05)
IMM GRANULOCYTES NFR BLD AUTO: 0.5 % (ref 0–0.5)
LYMPHOCYTES # BLD AUTO: 2.38 10*3/MM3 (ref 0.7–3.1)
LYMPHOCYTES NFR BLD AUTO: 23.8 % (ref 19.6–45.3)
MCH RBC QN AUTO: 30.2 PG (ref 26.6–33)
MCHC RBC AUTO-ENTMCNC: 33.1 G/DL (ref 31.5–35.7)
MCV RBC AUTO: 91.2 FL (ref 79–97)
MONOCYTES # BLD AUTO: 0.51 10*3/MM3 (ref 0.1–0.9)
MONOCYTES NFR BLD AUTO: 5.1 % (ref 5–12)
NEUTROPHILS NFR BLD AUTO: 6.91 10*3/MM3 (ref 1.7–7)
NEUTROPHILS NFR BLD AUTO: 69.2 % (ref 42.7–76)
NRBC BLD AUTO-RTO: 0 /100 WBC (ref 0–0.2)
PLATELET # BLD AUTO: 325 10*3/MM3 (ref 140–450)
PMV BLD AUTO: 9.2 FL (ref 6–12)
POTASSIUM SERPL-SCNC: 4.6 MMOL/L (ref 3.5–5.2)
PROT SERPL-MCNC: 7.8 G/DL (ref 6–8.5)
RBC # BLD AUTO: 4.44 10*6/MM3 (ref 3.77–5.28)
SODIUM SERPL-SCNC: 142 MMOL/L (ref 136–145)
WBC NRBC COR # BLD AUTO: 9.99 10*3/MM3 (ref 3.4–10.8)

## 2024-06-21 PROCEDURE — 85025 COMPLETE CBC W/AUTO DIFF WBC: CPT | Performed by: INTERNAL MEDICINE

## 2024-06-21 PROCEDURE — 80053 COMPREHEN METABOLIC PANEL: CPT | Performed by: INTERNAL MEDICINE

## 2024-06-21 PROCEDURE — 82378 CARCINOEMBRYONIC ANTIGEN: CPT | Performed by: INTERNAL MEDICINE

## 2024-06-21 PROCEDURE — 86304 IMMUNOASSAY TUMOR CA 125: CPT | Performed by: INTERNAL MEDICINE

## 2024-06-21 RX ORDER — SODIUM CHLORIDE 9 MG/ML
40 INJECTION, SOLUTION INTRAVENOUS AS NEEDED
Status: CANCELLED | OUTPATIENT
Start: 2024-06-24

## 2024-06-21 RX ORDER — SODIUM CHLORIDE 0.9 % (FLUSH) 0.9 %
10 SYRINGE (ML) INJECTION EVERY 12 HOURS SCHEDULED
Status: CANCELLED | OUTPATIENT
Start: 2024-06-24

## 2024-06-21 RX ORDER — SODIUM CHLORIDE 0.9 % (FLUSH) 0.9 %
10 SYRINGE (ML) INJECTION AS NEEDED
Status: CANCELLED | OUTPATIENT
Start: 2024-06-24

## 2024-06-21 RX ORDER — OXYCODONE HYDROCHLORIDE 5 MG/1
5 CAPSULE ORAL AS NEEDED
COMMUNITY

## 2024-06-21 NOTE — PROGRESS NOTES
Venipuncture Blood Specimen Collection  Venipuncture performed in right arm by Kev Padilla MA with good hemostasis. Patient tolerated the procedure well without complications.   06/21/24   Kev Padilla MA

## 2024-06-21 NOTE — H&P (VIEW-ONLY)
Subjective   Jie Aragon is a 48 y.o. female who presents today for Initial Evaluation    Chief Complaint:    Chief Complaint   Patient presents with    Venous Access Maintenance        History of Present Illness:    History of Present Illness Jie is a 48-year-old female who presents for evaluation for a port placement.  She was recently diagnosed with ovarian cancer in early May.  Patient reports she is approximately 4 weeks postop from hysterectomy with hematology gynecology.  Patient currently was prescribed Xarelto to prevent blood clots or DVT, she states that she has not had it in several days.      The following portions of the patient's history were reviewed and updated as appropriate: allergies, current medications, past family history, past medical history, past social history, past surgical history and problem list.    Past Medical History:  Past Medical History:   Diagnosis Date    Abdominal pain     Change in vision     Chills     Cough     Difficulty breathing     Fatigue     Multiple gestation     Nausea     Night sweats     Ovarian cancer 5/24/2024    Poor appetite     Urinary tract infection     Wears dentures     UPPER AND LOWER    Weight gain        Social History:  Social History     Socioeconomic History    Marital status: Single   Tobacco Use    Smoking status: Every Day     Current packs/day: 1.00     Average packs/day: 1 pack/day for 15.0 years (15.0 ttl pk-yrs)     Types: Cigarettes     Passive exposure: Current    Smokeless tobacco: Never   Vaping Use    Vaping status: Never Used   Substance and Sexual Activity    Alcohol use: Never    Drug use: Never    Sexual activity: Yes     Partners: Male     Birth control/protection: I.U.D., Tubal ligation       Family History:  Family History   Problem Relation Age of Onset    Stroke Father     Hypertension Father     Heart attack Father     Deep vein thrombosis Mother     Leukemia Brother     Testicular cancer Son        Past Surgical  History:  Past Surgical History:   Procedure Laterality Date    APPENDECTOMY N/A 5/23/2024    Procedure: APPENDECTOMY;  Surgeon: Shanda Dubois MD;  Location:  [facility];  Service: Gynecology Oncology;  Laterality: N/A;    CYSTOSCOPY Bilateral 5/23/2024    Procedure: CYSTOSCOPY TEMPORARY PLACEMENT BILATERAL URETERAL CATHETER;  Surgeon: Shanda Dubois MD;  Location:  [facility];  Service: Gynecology Oncology;  Laterality: Bilateral;    EXPLORATORY LAPAROTOMY, TOTAL ABDOMINAL HYSTERECTOMY SALPINGO OOPHORECTOMY N/A 5/23/2024    Procedure: EXPLORATORY LAPAROTOMY, TOTAL ABDOMINAL HYSTERECTOMY BILATERAL SALPINGO OOPHORECTOMY, CANCER STAGING/ OPTIMAL DEBULKLING (R=0);  Surgeon: Shanda Dubois MD;  Location:  [facility];  Service: Gynecology Oncology;  Laterality: N/A;    OMENTECTOMY N/A 5/23/2024    Procedure: OMENTECTOMY;  Surgeon: Shanda Dubois MD;  Location:  [facility];  Service: Gynecology Oncology;  Laterality: N/A;    TUBAL ABDOMINAL LIGATION         Problem List:  Patient Active Problem List   Diagnosis    Pelvic mass    Bilateral tubo-ovarian mass    Ovarian cancer       Allergy:   Allergies   Allergen Reactions    Amoxicillin Other (See Comments)     unknown    Penicillins Other (See Comments)     unknown        Current Medications:   Current Outpatient Medications   Medication Sig Dispense Refill    acetaminophen (TYLENOL) 325 MG tablet Take 2 tablets by mouth Every 6 (Six) Hours. 30 tablet 1    docusate sodium (COLACE) 100 MG capsule Take 2 capsules by mouth 2 (Two) Times a Day As Needed for Constipation. Two capusles 60 capsule 3    ibuprofen (ADVIL,MOTRIN) 600 MG tablet Take 1 tablet by mouth Every 6 (Six) Hours As Needed for Mild Pain. 30 tablet 1    naloxone (NARCAN) 4 MG/0.1ML nasal spray Call 911. Don't prime. [place] in 1 nostril for overdose. Repeat in 2-3 minutes in other nostril if no or minimal breathing/responsiveness. 2 each 0    ondansetron (ZOFRAN) 4 MG tablet Take 1 tablet by mouth Every  "8 (Eight) Hours As Needed for Nausea or Vomiting. HAS NOT HAD TO TAKE AS OF 05/21/2024      oxyCODONE (OXY-IR) 5 MG capsule Take 1 capsule by mouth As Needed for Moderate Pain.      polyethylene glycol (MIRALAX) 17 GM/SCOOP powder Take 17 g by mouth Daily. 510 g 0    rivaroxaban (Xarelto) 10 MG tablet Take 1 tablet by mouth Daily. 30 tablet 0     No current facility-administered medications for this visit.       Review of Systems:    Review of Systems   Gastrointestinal:  Positive for abdominal distention.         Physical Exam:   Physical Exam  Constitutional:       Appearance: Normal appearance.   HENT:      Head: Normocephalic and atraumatic.   Eyes:      Conjunctiva/sclera: Conjunctivae normal.   Pulmonary:      Effort: Pulmonary effort is normal.   Abdominal:      General: Abdomen is flat.   Musculoskeletal:         General: Normal range of motion.      Cervical back: Normal range of motion and neck supple.   Skin:     General: Skin is warm and dry.      Capillary Refill: Capillary refill takes less than 2 seconds.   Neurological:      General: No focal deficit present.      Mental Status: She is alert and oriented to person, place, and time.   Psychiatric:         Mood and Affect: Mood normal.         Behavior: Behavior normal.         Vitals:  Blood pressure 102/75, height 175.3 cm (69.02\"), weight 64.4 kg (142 lb).   Body mass index is 20.96 kg/m².         Assessment & Plan   Diagnoses and all orders for this visit:    1. Malignant neoplasm of both ovaries (Primary)  -     Case Request; Standing  -     sodium chloride 0.9 % flush 10 mL  -     sodium chloride 0.9 % flush 10 mL  -     sodium chloride 0.9 % infusion 40 mL  -     ceFAZolin 2000 mg IVPB in 100 mL NS (VTB)  -     Case Request    Other orders  -     Follow Anesthesia Guidelines / Protocol; Future  -     Follow Anesthesia Guidelines / Protocol; Standing  -     Verify / Perform Chlorhexidine Skin Prep; Standing  -     Obtain Informed Consent; " Future  -     Provide NPO Instructions to Patient; Future  -     Chlorhexidine Skin Prep; Future  -     Insert Peripheral IV; Standing  -     Saline Lock & Maintain IV Access; Standing  -     Place Sequential Compression Device; Standing  -     Maintain Sequential Compression Device; Standing    Jie is a 48-year-old female presents for evaluation for port placement.  She will undergo port placement with Dr. Chung next week.  Verbalized understanding prep instructions and procedure wished to proceed.    Visit Diagnoses:    ICD-10-CM ICD-9-CM   1. Malignant neoplasm of both ovaries  C56.3 183.0         MEDS ORDERED DURING VISIT:  No orders of the defined types were placed in this encounter.      Return if symptoms worsen or fail to improve.             This document has been electronically signed by ESTHER Thayer  June 21, 2024 14:47 EDT    Please note that portions of this note were completed with a voice recognition program.

## 2024-06-21 NOTE — PROGRESS NOTES
Subjective   Jie Aragon is a 48 y.o. female who presents today for Initial Evaluation    Chief Complaint:    Chief Complaint   Patient presents with    Venous Access Maintenance        History of Present Illness:    History of Present Illness Jie is a 48-year-old female who presents for evaluation for a port placement.  She was recently diagnosed with ovarian cancer in early May.  Patient reports she is approximately 4 weeks postop from hysterectomy with hematology gynecology.  Patient currently was prescribed Xarelto to prevent blood clots or DVT, she states that she has not had it in several days.      The following portions of the patient's history were reviewed and updated as appropriate: allergies, current medications, past family history, past medical history, past social history, past surgical history and problem list.    Past Medical History:  Past Medical History:   Diagnosis Date    Abdominal pain     Change in vision     Chills     Cough     Difficulty breathing     Fatigue     Multiple gestation     Nausea     Night sweats     Ovarian cancer 5/24/2024    Poor appetite     Urinary tract infection     Wears dentures     UPPER AND LOWER    Weight gain        Social History:  Social History     Socioeconomic History    Marital status: Single   Tobacco Use    Smoking status: Every Day     Current packs/day: 1.00     Average packs/day: 1 pack/day for 15.0 years (15.0 ttl pk-yrs)     Types: Cigarettes     Passive exposure: Current    Smokeless tobacco: Never   Vaping Use    Vaping status: Never Used   Substance and Sexual Activity    Alcohol use: Never    Drug use: Never    Sexual activity: Yes     Partners: Male     Birth control/protection: I.U.D., Tubal ligation       Family History:  Family History   Problem Relation Age of Onset    Stroke Father     Hypertension Father     Heart attack Father     Deep vein thrombosis Mother     Leukemia Brother     Testicular cancer Son        Past Surgical  History:  Past Surgical History:   Procedure Laterality Date    APPENDECTOMY N/A 5/23/2024    Procedure: APPENDECTOMY;  Surgeon: Shanda Dubois MD;  Location:  [facility];  Service: Gynecology Oncology;  Laterality: N/A;    CYSTOSCOPY Bilateral 5/23/2024    Procedure: CYSTOSCOPY TEMPORARY PLACEMENT BILATERAL URETERAL CATHETER;  Surgeon: Shanda Dubois MD;  Location:  [facility];  Service: Gynecology Oncology;  Laterality: Bilateral;    EXPLORATORY LAPAROTOMY, TOTAL ABDOMINAL HYSTERECTOMY SALPINGO OOPHORECTOMY N/A 5/23/2024    Procedure: EXPLORATORY LAPAROTOMY, TOTAL ABDOMINAL HYSTERECTOMY BILATERAL SALPINGO OOPHORECTOMY, CANCER STAGING/ OPTIMAL DEBULKLING (R=0);  Surgeon: Shanda Dubois MD;  Location:  [facility];  Service: Gynecology Oncology;  Laterality: N/A;    OMENTECTOMY N/A 5/23/2024    Procedure: OMENTECTOMY;  Surgeon: Shanda Dubois MD;  Location:  [facility];  Service: Gynecology Oncology;  Laterality: N/A;    TUBAL ABDOMINAL LIGATION         Problem List:  Patient Active Problem List   Diagnosis    Pelvic mass    Bilateral tubo-ovarian mass    Ovarian cancer       Allergy:   Allergies   Allergen Reactions    Amoxicillin Other (See Comments)     unknown    Penicillins Other (See Comments)     unknown        Current Medications:   Current Outpatient Medications   Medication Sig Dispense Refill    acetaminophen (TYLENOL) 325 MG tablet Take 2 tablets by mouth Every 6 (Six) Hours. 30 tablet 1    docusate sodium (COLACE) 100 MG capsule Take 2 capsules by mouth 2 (Two) Times a Day As Needed for Constipation. Two capusles 60 capsule 3    ibuprofen (ADVIL,MOTRIN) 600 MG tablet Take 1 tablet by mouth Every 6 (Six) Hours As Needed for Mild Pain. 30 tablet 1    naloxone (NARCAN) 4 MG/0.1ML nasal spray Call 911. Don't prime. [place] in 1 nostril for overdose. Repeat in 2-3 minutes in other nostril if no or minimal breathing/responsiveness. 2 each 0    ondansetron (ZOFRAN) 4 MG tablet Take 1 tablet by mouth Every  "8 (Eight) Hours As Needed for Nausea or Vomiting. HAS NOT HAD TO TAKE AS OF 05/21/2024      oxyCODONE (OXY-IR) 5 MG capsule Take 1 capsule by mouth As Needed for Moderate Pain.      polyethylene glycol (MIRALAX) 17 GM/SCOOP powder Take 17 g by mouth Daily. 510 g 0    rivaroxaban (Xarelto) 10 MG tablet Take 1 tablet by mouth Daily. 30 tablet 0     No current facility-administered medications for this visit.       Review of Systems:    Review of Systems   Gastrointestinal:  Positive for abdominal distention.         Physical Exam:   Physical Exam  Constitutional:       Appearance: Normal appearance.   HENT:      Head: Normocephalic and atraumatic.   Eyes:      Conjunctiva/sclera: Conjunctivae normal.   Pulmonary:      Effort: Pulmonary effort is normal.   Abdominal:      General: Abdomen is flat.   Musculoskeletal:         General: Normal range of motion.      Cervical back: Normal range of motion and neck supple.   Skin:     General: Skin is warm and dry.      Capillary Refill: Capillary refill takes less than 2 seconds.   Neurological:      General: No focal deficit present.      Mental Status: She is alert and oriented to person, place, and time.   Psychiatric:         Mood and Affect: Mood normal.         Behavior: Behavior normal.         Vitals:  Blood pressure 102/75, height 175.3 cm (69.02\"), weight 64.4 kg (142 lb).   Body mass index is 20.96 kg/m².         Assessment & Plan   Diagnoses and all orders for this visit:    1. Malignant neoplasm of both ovaries (Primary)  -     Case Request; Standing  -     sodium chloride 0.9 % flush 10 mL  -     sodium chloride 0.9 % flush 10 mL  -     sodium chloride 0.9 % infusion 40 mL  -     ceFAZolin 2000 mg IVPB in 100 mL NS (VTB)  -     Case Request    Other orders  -     Follow Anesthesia Guidelines / Protocol; Future  -     Follow Anesthesia Guidelines / Protocol; Standing  -     Verify / Perform Chlorhexidine Skin Prep; Standing  -     Obtain Informed Consent; " Future  -     Provide NPO Instructions to Patient; Future  -     Chlorhexidine Skin Prep; Future  -     Insert Peripheral IV; Standing  -     Saline Lock & Maintain IV Access; Standing  -     Place Sequential Compression Device; Standing  -     Maintain Sequential Compression Device; Standing    Jie is a 48-year-old female presents for evaluation for port placement.  She will undergo port placement with Dr. Chung next week.  Verbalized understanding prep instructions and procedure wished to proceed.    Visit Diagnoses:    ICD-10-CM ICD-9-CM   1. Malignant neoplasm of both ovaries  C56.3 183.0         MEDS ORDERED DURING VISIT:  No orders of the defined types were placed in this encounter.      Return if symptoms worsen or fail to improve.             This document has been electronically signed by ESTHER Thayer  June 21, 2024 14:47 EDT    Please note that portions of this note were completed with a voice recognition program.

## 2024-06-22 LAB
CANCER AG125 SERPL QL: 21.4 U/ML (ref 0–38.1)
CEA SERPL-MCNC: 3.05 NG/ML

## 2024-06-24 ENCOUNTER — ANESTHESIA (OUTPATIENT)
Dept: PERIOP | Facility: HOSPITAL | Age: 49
End: 2024-06-24
Payer: COMMERCIAL

## 2024-06-24 ENCOUNTER — APPOINTMENT (OUTPATIENT)
Dept: GENERAL RADIOLOGY | Facility: HOSPITAL | Age: 49
End: 2024-06-24
Payer: COMMERCIAL

## 2024-06-24 ENCOUNTER — ANESTHESIA EVENT (OUTPATIENT)
Dept: PERIOP | Facility: HOSPITAL | Age: 49
End: 2024-06-24
Payer: COMMERCIAL

## 2024-06-24 ENCOUNTER — HOSPITAL ENCOUNTER (OUTPATIENT)
Facility: HOSPITAL | Age: 49
Setting detail: HOSPITAL OUTPATIENT SURGERY
Discharge: HOME | End: 2024-06-24
Attending: SURGERY | Admitting: SURGERY
Payer: COMMERCIAL

## 2024-06-24 VITALS
OXYGEN SATURATION: 100 % | TEMPERATURE: 97.9 F | RESPIRATION RATE: 16 BRPM | HEART RATE: 82 BPM | DIASTOLIC BLOOD PRESSURE: 69 MMHG | SYSTOLIC BLOOD PRESSURE: 112 MMHG | WEIGHT: 139 LBS | BODY MASS INDEX: 20.59 KG/M2 | HEIGHT: 69 IN

## 2024-06-24 DIAGNOSIS — C56.3 MALIGNANT NEOPLASM OF BOTH OVARIES: ICD-10-CM

## 2024-06-24 PROCEDURE — 25010000002 HEPARIN LOCK FLUSH PER 10 UNITS: Performed by: SURGERY

## 2024-06-24 PROCEDURE — 25010000002 LIDOCAINE 1 % SOLUTION: Performed by: SURGERY

## 2024-06-24 PROCEDURE — 25010000002 ONDANSETRON PER 1 MG: Performed by: NURSE ANESTHETIST, CERTIFIED REGISTERED

## 2024-06-24 PROCEDURE — 25810000003 LACTATED RINGERS PER 1000 ML: Performed by: NURSE ANESTHETIST, CERTIFIED REGISTERED

## 2024-06-24 PROCEDURE — 25010000002 CEFAZOLIN PER 500 MG

## 2024-06-24 PROCEDURE — 77001 FLUOROGUIDE FOR VEIN DEVICE: CPT | Performed by: SURGERY

## 2024-06-24 PROCEDURE — 76000 FLUOROSCOPY <1 HR PHYS/QHP: CPT | Performed by: RADIOLOGY

## 2024-06-24 PROCEDURE — 36561 INSERT TUNNELED CV CATH: CPT | Performed by: SURGERY

## 2024-06-24 PROCEDURE — 25010000002 PROPOFOL 200 MG/20ML EMULSION: Performed by: NURSE ANESTHETIST, CERTIFIED REGISTERED

## 2024-06-24 PROCEDURE — 25010000002 FENTANYL CITRATE (PF) 50 MCG/ML SOLUTION: Performed by: NURSE ANESTHETIST, CERTIFIED REGISTERED

## 2024-06-24 PROCEDURE — C1788 PORT, INDWELLING, IMP: HCPCS | Performed by: SURGERY

## 2024-06-24 PROCEDURE — 25010000002 MIDAZOLAM PER 1 MG: Performed by: NURSE ANESTHETIST, CERTIFIED REGISTERED

## 2024-06-24 PROCEDURE — 71045 X-RAY EXAM CHEST 1 VIEW: CPT

## 2024-06-24 PROCEDURE — 76000 FLUOROSCOPY <1 HR PHYS/QHP: CPT

## 2024-06-24 PROCEDURE — 25810000003 LACTATED RINGERS PER 1000 ML: Performed by: ANESTHESIOLOGY

## 2024-06-24 PROCEDURE — 71045 X-RAY EXAM CHEST 1 VIEW: CPT | Performed by: RADIOLOGY

## 2024-06-24 PROCEDURE — C1894 INTRO/SHEATH, NON-LASER: HCPCS | Performed by: SURGERY

## 2024-06-24 DEVICE — POWERPORT CLEARVUE ISP IMPLANTABLE PORT WITH ATTACHABLE 8F POLYURETHANE OPEN-ENDED SINGLE-LUMEN VENOUS CATHETER PROCEDURAL KIT
Type: IMPLANTABLE DEVICE | Site: SUBCLAVIAN | Status: FUNCTIONAL
Brand: POWERPORT CLEARVUE

## 2024-06-24 RX ORDER — SODIUM CHLORIDE, SODIUM LACTATE, POTASSIUM CHLORIDE, CALCIUM CHLORIDE 600; 310; 30; 20 MG/100ML; MG/100ML; MG/100ML; MG/100ML
100 INJECTION, SOLUTION INTRAVENOUS ONCE AS NEEDED
Status: DISCONTINUED | OUTPATIENT
Start: 2024-06-24 | End: 2024-06-24 | Stop reason: HOSPADM

## 2024-06-24 RX ORDER — PROPOFOL 10 MG/ML
INJECTION, EMULSION INTRAVENOUS AS NEEDED
Status: DISCONTINUED | OUTPATIENT
Start: 2024-06-24 | End: 2024-06-24 | Stop reason: SURG

## 2024-06-24 RX ORDER — SODIUM CHLORIDE 9 MG/ML
40 INJECTION, SOLUTION INTRAVENOUS AS NEEDED
Status: DISCONTINUED | OUTPATIENT
Start: 2024-06-24 | End: 2024-06-24 | Stop reason: HOSPADM

## 2024-06-24 RX ORDER — SODIUM CHLORIDE 0.9 % (FLUSH) 0.9 %
10 SYRINGE (ML) INJECTION EVERY 12 HOURS SCHEDULED
Status: DISCONTINUED | OUTPATIENT
Start: 2024-06-24 | End: 2024-06-24 | Stop reason: HOSPADM

## 2024-06-24 RX ORDER — FENTANYL CITRATE 50 UG/ML
INJECTION, SOLUTION INTRAMUSCULAR; INTRAVENOUS AS NEEDED
Status: DISCONTINUED | OUTPATIENT
Start: 2024-06-24 | End: 2024-06-24 | Stop reason: SURG

## 2024-06-24 RX ORDER — ONDANSETRON 2 MG/ML
INJECTION INTRAMUSCULAR; INTRAVENOUS AS NEEDED
Status: DISCONTINUED | OUTPATIENT
Start: 2024-06-24 | End: 2024-06-24 | Stop reason: SURG

## 2024-06-24 RX ORDER — IPRATROPIUM BROMIDE AND ALBUTEROL SULFATE 2.5; .5 MG/3ML; MG/3ML
3 SOLUTION RESPIRATORY (INHALATION) ONCE AS NEEDED
Status: DISCONTINUED | OUTPATIENT
Start: 2024-06-24 | End: 2024-06-24 | Stop reason: HOSPADM

## 2024-06-24 RX ORDER — SODIUM CHLORIDE 0.9 % (FLUSH) 0.9 %
10 SYRINGE (ML) INJECTION AS NEEDED
Status: DISCONTINUED | OUTPATIENT
Start: 2024-06-24 | End: 2024-06-24 | Stop reason: HOSPADM

## 2024-06-24 RX ORDER — MIDAZOLAM HYDROCHLORIDE 1 MG/ML
1 INJECTION INTRAMUSCULAR; INTRAVENOUS
Status: DISCONTINUED | OUTPATIENT
Start: 2024-06-24 | End: 2024-06-24 | Stop reason: HOSPADM

## 2024-06-24 RX ORDER — HEPARIN SODIUM (PORCINE) LOCK FLUSH IV SOLN 100 UNIT/ML 100 UNIT/ML
SOLUTION INTRAVENOUS AS NEEDED
Status: DISCONTINUED | OUTPATIENT
Start: 2024-06-24 | End: 2024-06-24 | Stop reason: HOSPADM

## 2024-06-24 RX ORDER — LIDOCAINE HYDROCHLORIDE 20 MG/ML
INJECTION, SOLUTION EPIDURAL; INFILTRATION; INTRACAUDAL; PERINEURAL AS NEEDED
Status: DISCONTINUED | OUTPATIENT
Start: 2024-06-24 | End: 2024-06-24 | Stop reason: SURG

## 2024-06-24 RX ORDER — OXYCODONE HYDROCHLORIDE AND ACETAMINOPHEN 5; 325 MG/1; MG/1
1 TABLET ORAL ONCE AS NEEDED
Status: DISCONTINUED | OUTPATIENT
Start: 2024-06-24 | End: 2024-06-24 | Stop reason: HOSPADM

## 2024-06-24 RX ORDER — FENTANYL CITRATE 50 UG/ML
50 INJECTION, SOLUTION INTRAMUSCULAR; INTRAVENOUS
Status: DISCONTINUED | OUTPATIENT
Start: 2024-06-24 | End: 2024-06-24 | Stop reason: HOSPADM

## 2024-06-24 RX ORDER — SODIUM CHLORIDE, SODIUM LACTATE, POTASSIUM CHLORIDE, CALCIUM CHLORIDE 600; 310; 30; 20 MG/100ML; MG/100ML; MG/100ML; MG/100ML
INJECTION, SOLUTION INTRAVENOUS CONTINUOUS PRN
Status: DISCONTINUED | OUTPATIENT
Start: 2024-06-24 | End: 2024-06-24 | Stop reason: SURG

## 2024-06-24 RX ORDER — LIDOCAINE HYDROCHLORIDE 10 MG/ML
INJECTION, SOLUTION INFILTRATION; PERINEURAL AS NEEDED
Status: DISCONTINUED | OUTPATIENT
Start: 2024-06-24 | End: 2024-06-24 | Stop reason: HOSPADM

## 2024-06-24 RX ORDER — SODIUM CHLORIDE, SODIUM LACTATE, POTASSIUM CHLORIDE, CALCIUM CHLORIDE 600; 310; 30; 20 MG/100ML; MG/100ML; MG/100ML; MG/100ML
125 INJECTION, SOLUTION INTRAVENOUS ONCE
Status: COMPLETED | OUTPATIENT
Start: 2024-06-24 | End: 2024-06-24

## 2024-06-24 RX ORDER — MIDAZOLAM HYDROCHLORIDE 1 MG/ML
INJECTION INTRAMUSCULAR; INTRAVENOUS AS NEEDED
Status: DISCONTINUED | OUTPATIENT
Start: 2024-06-24 | End: 2024-06-24 | Stop reason: SURG

## 2024-06-24 RX ORDER — MAGNESIUM HYDROXIDE 1200 MG/15ML
LIQUID ORAL AS NEEDED
Status: DISCONTINUED | OUTPATIENT
Start: 2024-06-24 | End: 2024-06-24 | Stop reason: HOSPADM

## 2024-06-24 RX ORDER — ONDANSETRON 2 MG/ML
4 INJECTION INTRAMUSCULAR; INTRAVENOUS AS NEEDED
Status: DISCONTINUED | OUTPATIENT
Start: 2024-06-24 | End: 2024-06-24 | Stop reason: HOSPADM

## 2024-06-24 RX ORDER — FAMOTIDINE 10 MG/ML
INJECTION, SOLUTION INTRAVENOUS AS NEEDED
Status: DISCONTINUED | OUTPATIENT
Start: 2024-06-24 | End: 2024-06-24 | Stop reason: SURG

## 2024-06-24 RX ORDER — MEPERIDINE HYDROCHLORIDE 25 MG/ML
12.5 INJECTION INTRAMUSCULAR; INTRAVENOUS; SUBCUTANEOUS
Status: DISCONTINUED | OUTPATIENT
Start: 2024-06-24 | End: 2024-06-24 | Stop reason: HOSPADM

## 2024-06-24 RX ADMIN — SODIUM CHLORIDE, POTASSIUM CHLORIDE, SODIUM LACTATE AND CALCIUM CHLORIDE: 600; 310; 30; 20 INJECTION, SOLUTION INTRAVENOUS at 12:00

## 2024-06-24 RX ADMIN — CEFAZOLIN 2000 MG: 2 INJECTION, POWDER, FOR SOLUTION INTRAMUSCULAR; INTRAVENOUS at 12:00

## 2024-06-24 RX ADMIN — ONDANSETRON 4 MG: 2 INJECTION INTRAMUSCULAR; INTRAVENOUS at 12:05

## 2024-06-24 RX ADMIN — FAMOTIDINE 20 MG: 10 INJECTION, SOLUTION INTRAVENOUS at 12:05

## 2024-06-24 RX ADMIN — LIDOCAINE HYDROCHLORIDE 60 MG: 20 INJECTION, SOLUTION EPIDURAL; INFILTRATION; INTRACAUDAL; PERINEURAL at 12:05

## 2024-06-24 RX ADMIN — FENTANYL CITRATE 100 MCG: 50 INJECTION INTRAMUSCULAR; INTRAVENOUS at 12:05

## 2024-06-24 RX ADMIN — MIDAZOLAM HYDROCHLORIDE 2 MG: 1 INJECTION, SOLUTION INTRAMUSCULAR; INTRAVENOUS at 12:00

## 2024-06-24 RX ADMIN — SODIUM CHLORIDE, POTASSIUM CHLORIDE, SODIUM LACTATE AND CALCIUM CHLORIDE 125 ML/HR: 600; 310; 30; 20 INJECTION, SOLUTION INTRAVENOUS at 11:03

## 2024-06-24 RX ADMIN — PROPOFOL 150 MG: 10 INJECTION, EMULSION INTRAVENOUS at 12:05

## 2024-06-24 NOTE — ANESTHESIA PREPROCEDURE EVALUATION
Anesthesia Evaluation     Patient summary reviewed and Nursing notes reviewed   no history of anesthetic complications:   NPO Solid Status: > 8 hours  NPO Liquid Status: > 8 hours           Airway   Mallampati: I  TM distance: >3 FB  Neck ROM: full  No difficulty expected  Dental    (+) edentulous, upper dentures and lower dentures    Pulmonary    (+) a smoker Current, Smoked day of surgery,  (-) asthma, shortness of breath, recent URI, sleep apnea  Cardiovascular     (-) hypertension, dysrhythmias, angina, cardiac stents      Neuro/Psych  (-) seizures, CVA  GI/Hepatic/Renal/Endo    (-) no renal disease, diabetes, no thyroid disorder    Musculoskeletal     Abdominal    Substance History      OB/GYN    (-)  Pregnant    Comment: CT ; bilateral pelvic masses likely ovarian. Right measures [id number], and left 11x5cm with septations.  is 65      Other      history of cancer active    ROS/Med Hx Other: Oxycodone for ABD/ BACK PAIN   LABS wnl               Anesthesia Plan    ASA 3     general     (  )  intravenous induction     Anesthetic plan, risks, benefits, and alternatives have been provided, discussed and informed consent has been obtained with: patient.    Plan discussed with CRNA.    CODE STATUS:

## 2024-06-24 NOTE — OP NOTE
OPERATIVE NOTE    Patient Name:  Jie Aragon  YOB: 1975  [id number]    Date of Surgery:  6/24/2024      PREOPERATIVE DIAGNOSIS: Ovarian cancer      POSTOPERATIVE DIAGNOSIS: Same       PROCEDURE PERFORMED: Right subclavian subclavian port placement under fluoroscopic guidance       SURGEON: Yakov Chung MD       Circulator: Sasha Mac RN  Radiology Technologist: Glen Richardson, RT  Scrub Person: Arlet Ortiz  Assistant: Jeanette Cutler        SPECIMENS: None       ANESTHESIA: General.  Local       FINDINGS:   1.  Right subclavian [place] PowerPort       INDICATIONS: The patient is a 48 y.o. female has been diagnosed with ovarian cancer and there are plans for adjuvant chemotherapy.  Risks and benefits of right or left internal jugular or subclavian port placement discussed with the patient and she elected to proceed       DESCRIPTION OF PROCEDURE:      After obtaining informed consent, the patient was taken to the operating room and placed in supine  position. After appropriate DVT and antibiotic prophylaxis, general anesthesia was induced. The patient's neck and chest were then prepped in the normal sterile fashion. A proper OR timeout was performed.     Patient was placed in the Trendelenburg position. Local anesthetic was administered to right upper chest wall and along the planned catheter tract.  Micropuncture finder needle was used to access the right subclavian vein. This took 1 attempt. Once accessed, guidewire was advanced into position under fluoroscopic guidance.  This was then exchanged for 4 English sheath and the PowerPort kit wire was then advanced into position under fluoroscopic guidance.  A 3 cm incision was made in the chest wall, including the wire entrance site into the skin. A subcutaneous pocket was created, down to the pectoralis fascia with combination of electrocautery and blunt dissection. Preflushed port was then sutured  to the pectoralis fascia with Prolene sutures at two points within the pocket. Wire tract was then dilated and combination dilator/sheath advanced under fluoroscopic guidance. The dilator was then removed.   Catheter was advanced into the sheath. Sheath was carefully removed to ensure the catheter remained in place. Final position was confirmed with fluoroscopy. Port flushed and scar back appropriately. It was flushed with heparin.    Subcutaneous tissue was closed with 4-0 Vicryl suture and skin with running 4-0 Monocryl. Dermabond placed over incision.      All sponge and needle counts were correct X 2 at the completion of the procedure. The patient recovered from anesthesia and was transported to the PACU  in stable condition. Postop chest xray was ordered, to be completed in PACU      Yakov Chnug MD  6/24/2024  12:41 EDT

## 2024-06-24 NOTE — INTERVAL H&P NOTE
H&P reviewed.  The patient was examined and there are no changes to the H&P.  To the OR for right or left internal jugular or subclavian port placement

## 2024-06-24 NOTE — ANESTHESIA POSTPROCEDURE EVALUATION
Patient: Jie Aragon    Procedure Summary       Date: 06/24/24 Room / Location: [facility] OR 01 /  [facility]    Anesthesia Start: 1200 Anesthesia Stop: 1242    Procedure: INSERTION OF PORTACATH (Right) Diagnosis:       Malignant neoplasm of both ovaries      (Malignant neoplasm of both ovaries [C56.3])    Surgeons: Yakov Chung MD Provider: Jatin Kim MD    Anesthesia Type: general ASA Status: 3            Anesthesia Type: general    Vitals  Vitals Value Taken Time   /74 06/24/24 1304   Temp 97.3 °F (36.3 °C) 06/24/24 1243   Pulse 82 06/24/24 1307   Resp 16 06/24/24 1303   SpO2 100 % 06/24/24 1307   Vitals shown include unfiled device data.        Post Anesthesia Care and Evaluation    Patient location during evaluation: PHASE II  Patient participation: complete - patient participated  Level of consciousness: awake and alert  Pain score: 1  Pain management: adequate    Airway patency: patent  Anesthetic complications: No anesthetic complications  PONV Status: controlled  Cardiovascular status: acceptable  Respiratory status: acceptable  Hydration status: acceptable

## 2024-06-24 NOTE — ANESTHESIA PROCEDURE NOTES
Airway  Urgency: elective    Date/Time: 6/24/2024 12:06 PM    General Information and Staff    Patient location during procedure: OR  CRNA/CAA: Tayler Walters CRNA    Indications and Patient Condition    Preoxygenated: yes      Final Airway Details  Final airway type: supraglottic airway      Successful airway: unique  Size 4     Number of attempts at approach: 1  Assessment: lips, teeth, and gum same as pre-op and atraumatic intubation

## 2024-06-25 ENCOUNTER — PATIENT ROUNDING (BHMG ONLY) (OUTPATIENT)
Dept: ONCOLOGY | Facility: CLINIC | Age: 49
End: 2024-06-25
Payer: COMMERCIAL

## 2024-06-26 ENCOUNTER — OFFICE VISIT (OUTPATIENT)
Dept: ONCOLOGY | Facility: CLINIC | Age: 49
End: 2024-06-26
Payer: COMMERCIAL

## 2024-06-26 VITALS
DIASTOLIC BLOOD PRESSURE: 82 MMHG | RESPIRATION RATE: 20 BRPM | TEMPERATURE: 97.8 F | BODY MASS INDEX: 20.5 KG/M2 | SYSTOLIC BLOOD PRESSURE: 117 MMHG | OXYGEN SATURATION: 100 % | WEIGHT: 138.8 LBS | HEART RATE: 111 BPM

## 2024-06-26 DIAGNOSIS — C56.3 MALIGNANT NEOPLASM OF BOTH OVARIES: Primary | ICD-10-CM

## 2024-06-26 PROCEDURE — 1126F AMNT PAIN NOTED NONE PRSNT: CPT | Performed by: NURSE PRACTITIONER

## 2024-06-26 PROCEDURE — 99215 OFFICE O/P EST HI 40 MIN: CPT | Performed by: NURSE PRACTITIONER

## 2024-06-26 RX ORDER — PROCHLORPERAZINE MALEATE 10 MG
10 TABLET ORAL EVERY 6 HOURS PRN
Qty: 30 TABLET | Refills: 1 | Status: SHIPPED | OUTPATIENT
Start: 2024-06-26

## 2024-06-26 RX ORDER — ONDANSETRON HYDROCHLORIDE 8 MG/1
8 TABLET, FILM COATED ORAL 3 TIMES DAILY PRN
Qty: 30 TABLET | Refills: 5 | Status: SHIPPED | OUTPATIENT
Start: 2024-06-26

## 2024-06-26 RX ORDER — OLANZAPINE 5 MG/1
5 TABLET ORAL NIGHTLY
Qty: 4 TABLET | Refills: 5 | Status: SHIPPED | OUTPATIENT
Start: 2024-06-26

## 2024-06-26 RX ORDER — IBUPROFEN 600 MG/1
600 TABLET ORAL EVERY 8 HOURS PRN
Qty: 30 TABLET | Refills: 0 | Status: CANCELLED | OUTPATIENT
Start: 2024-06-26

## 2024-06-26 RX ORDER — LIDOCAINE AND PRILOCAINE 25; 25 MG/G; MG/G
CREAM TOPICAL
Qty: 30 G | Refills: 1 | Status: SHIPPED | OUTPATIENT
Start: 2024-06-26

## 2024-06-26 RX ORDER — LORATADINE 10 MG/1
TABLET ORAL
Qty: 7 TABLET | Refills: 5 | Status: SHIPPED | OUTPATIENT
Start: 2024-06-26

## 2024-06-26 NOTE — PROGRESS NOTES
CHEMOTHERAPY PREPARATION    Jie Aragon  [id number]  1975    Chief Complaint: Chemotherapy Education    History of present illness:  Jie Aragon is a 48 y.o. year old female who is here today for chemotherapy preparation and needs assessment. The patient has been diagnosed with ovarian cancer and is scheduled to begin treatment with Carboplatin/Paclitaxel/Pegfilgrastim. She is without specific complaints.      Oncology History:    Oncology/Hematology History   Ovarian cancer   5/23/2024 Cancer Staged    Staging form: Ovary, Fallopian Tube, And Primary Peritoneal Carcinoma, AJCC 8th Edition  - Clinical stage from 5/23/2024: FIGO Stage II (cT2, cN0, cM0) - Signed by Shanda Dubois MD on 6/13/2024  Staging comments: At the time of laparotomy, there is a massively enlarged right ovary that was adhered to the right pelvis.   Both ovarian masses ruptured at the time of surgery.  Mainly fluid was noted although there was some tumor visualized at the right adnexa.      5/23/2024 Cancer Staged    Staging form: Ovary, Fallopian Tube, And Primary Peritoneal Carcinoma, AJCC 8th Edition  - Pathologic stage from 5/23/2024: Stage IC (pT1c2, pN0, cM0) - Signed by Shanda Dubois MD on 6/13/2024 5/24/2024 Initial Diagnosis    Ovarian cancer     6/21/2024 -  Chemotherapy    OP OVARIAN PACLitaxel / CARBOplatin AUC=6 (Q21D)       Past Medical History:   Diagnosis Date    Abdominal pain     Change in vision     Chills     Cough     Difficulty breathing     Fatigue     Multiple gestation     Nausea     Night sweats     Ovarian cancer 5/24/2024    Poor appetite     Urinary tract infection     Wears dentures     UPPER AND LOWER    Weight gain      Past Surgical History:   Procedure Laterality Date    APPENDECTOMY N/A 5/23/2024    Procedure: APPENDECTOMY;  Surgeon: Shanda Dubois MD;  Location: [facility];  Service: Gynecology Oncology;  Laterality: N/A;    CYSTOSCOPY Bilateral 5/23/2024     Procedure: CYSTOSCOPY TEMPORARY PLACEMENT BILATERAL URETERAL CATHETER;  Surgeon: Shanda Dubois MD;  Location:  [facility];  Service: Gynecology Oncology;  Laterality: Bilateral;    EXPLORATORY LAPAROTOMY, TOTAL ABDOMINAL HYSTERECTOMY SALPINGO OOPHORECTOMY N/A 5/23/2024    Procedure: EXPLORATORY LAPAROTOMY, TOTAL ABDOMINAL HYSTERECTOMY BILATERAL SALPINGO OOPHORECTOMY, CANCER STAGING/ OPTIMAL DEBULKLING (R=0);  Surgeon: Shanda Dubois MD;  Location:  [facility];  Service: Gynecology Oncology;  Laterality: N/A;    OMENTECTOMY N/A 5/23/2024    Procedure: OMENTECTOMY;  Surgeon: Shanda Dubois MD;  Location:  [facility];  Service: Gynecology Oncology;  Laterality: N/A;    PORTACATH PLACEMENT Right 6/24/2024    Procedure: INSERTION OF PORTACATH;  Surgeon: Yakov Chung MD;  Location:  [facility];  Service: General;  Laterality: Right;    TUBAL ABDOMINAL LIGATION       Family History   Problem Relation Age of Onset    Stroke Father     Hypertension Father     Heart attack Father     Deep vein thrombosis Mother     Leukemia Brother     Testicular cancer Son        Medications: The current medication list was reviewed and reconciled.     Allergies:  is allergic to amoxicillin and penicillins.  Review of Systems:  A comprehensive 14 point review of systems was conducted with patient and positive as per HPI otherwise negative.    Physical Exam:    Vitals:    06/26/24 1143   BP: 117/82   Pulse:    Resp:    Temp:    SpO2:      Vitals:    06/26/24 1100   PainSc: 0-No pain        ECOG: (0) Fully Active - Able to Carry On All Pre-disease Performance Without Restriction    General/Constitutional: Awake, alert and oriented. No apparent acute distress is noted.  HEENT: Normocephalic, atraumatic. PERRLA, conjunctiva normal. Extraocular movements are intact.  Neck: No JVD, thyromegaly or cervical lymphadenopathy.  Cardiovascular: S1, S2. Regular rate and rhythm, no murmurs, rubs or gallops.  Respiratory: Pulmonary effort is  "normal, lungs are clear to auscultation bilaterally. No wheezing, rhonchi or rales. No use of accessory muscles, no retractions.  Abdomen: Soft, non-tender, non-distended with normoactive bowel sounds x 4 quadrants. No palpable hepatosplenomegaly.  Lymph: No cervical, supraclavicular, axillary, inguinal or femoral adenopathy.  Integumentary: Skin warm and dry. No bruising, ecchymosis.  Extremities: No clubbing, cyanosis or edema.  Neurological: Alert and oriented x 3. Grossly non-focal examination.            NEEDS ASSESSMENTS    Genetics  The patient's new diagnosis and family history have been reviewed for genetic counseling needs. A genetic referral is not recommended.     Barriers to care  A barriers form was also completed by the patient today. We discussed services offered by our facility to help her have adequate access to care. The patient was given the name and card for our Oncology Social Worker, Kathe Barrios. Based upon barriers assessment today, the patient will not require a follow-up call from the  to further discuss needs.     VAD Assessment  The patient and I discussed planned intervenous chemotherapy as well as other IV treatments that are often needed throughout the course of treatment. These may include, but are not limited to blood transfusions, antibiotics, and IV hydration. The vasculature does not appear to be adequate for multiple peripheral IVs throughout their treatment course. Discussed risks and benefits of VADs. The patient would like to pursue Port-A-Cath insertion prior to initiation of treatment. Port-a-cath was placed on 06/24/24 per Dr. Chung.    Advanced Care Planning  The patient and I discussed advanced care planning, \"Conversations that Matter\".   This service was offered, free of charge, for development of advance directives with a certified ACP facilitator.  The patient does not have an up-to-date advanced directive. This document is not on file with our office. " The patient is not interested in an appointment with one of our facilitators to create or update their advanced directives.      Palliative Care  The patient and I discussed palliative care services. Palliative care is not the same as Hospice care. This is specialized medical care for people living with serious illness with the goal of improving quality of life for the patient and their family. [facility] has partnered with [facility] to offer our patients outpatient palliative care early along with their treatment to assist in coordination of care, symptom management, pain management, and medical decision making.  Oncology criteria for palliative care referral is not met at this time. The patient is not interested in a palliative care consultation.     Additional Referral needs  none      CHEMOTHERAPY EDUCATION    Booklets Given: Chemotherapy and You [x]  Eating Hints [x]    Sexuality/Fertility Books []      Chemotherapy/Biotherapy Education Sheets: (list all that apply)  nausea management, acid reflux management, diarrhea management, Cancer resourse contacts information, skin and mouth care, and vaccination information                                                                                                                                                                 Chemotherapy Regimen:   Treatment Plans       Name Type Hold Status Plan Dates Plan Provider       Active    OP OVARIAN PACLitaxel / CARBOplatin AUC=6 (Q21D) ONCOLOGY TREATMENT On Automatic Hold  6/20/2024 - Present Ros Vasquez MD                     TOPICS EDUCATION PROVIDED COMMENTS   ANEMIA:  role of RBC, cause, s/s, ways to manage, role of transfusion [x]    THROMBOCYTOPENIA:  role of platelet, cause, s/s, ways to prevent bleeding, things to avoid, when to seek help [x]    NEUTROPENIA:  role of WBC, cause, infection precautions, s/s of infection, when to call MD [x]    NUTRITION & APPETITE CHANGES:  importance of  maintaining healthy diet & weight, ways to manage to improve intake, dietary consult, exercise regimen [x]    DIARRHEA:  causes, s/s of dehydration, ways to manage, dietary changes, when to call MD [x]    CONSTIPATION:  causes, ways to manage, dietary changes, when to call MD [x]    NAUSEA & VOMITING:  cause, use of antiemetics, dietary changes, when to call MD [x]    MOUTH SORES:  causes, oral care, ways to manage [x]    ALOPECIA:  cause, ways to manage, resources [x]    INFERTILITY & SEXUALITY:  causes, fertility preservation options, sexuality changes, ways to manage, importance of birth control [x]    NERVOUS SYSTEM CHANGES:  causes, s/s, neuropathies, cognitive changes, ways to manage [x]    PAIN:  causes, ways to manage [x]    SKIN & NAIL CHANGES:  cause, s/s, ways to manage [x]    ORGAN TOXICITIES:  cause, s/s, need for diagnostic tests, labs, when to notify MD [x]    SURVIVORSHIP:  distress, distress assessment, secondary malignancies, early/late effects, follow-up, social issues, social support [x]    HOME CARE:  use of spill kits, storing of PO chemo, how to manage bodily fluids [x]    MISCELLANEOUS:  drug interactions, administration, vesicant, et [x]      Assessment and Plan:    Diagnoses and all orders for this visit:    1. Malignant neoplasm of both ovaries (Primary)  -     OLANZapine (ZyPREXA) 5 MG tablet; Take 1 tablet by mouth Every Night. Take nightly x 4 starting night of chemotherapy.  Dispense: 4 tablet; Refill: 5  -     ondansetron (ZOFRAN) 8 MG tablet; Take 1 tablet by mouth 3 (Three) Times a Day As Needed for Nausea or Vomiting.  Dispense: 30 tablet; Refill: 5    Other orders  -     prochlorperazine (COMPAZINE) 10 MG tablet; Take 1 tablet by mouth Every 6 (Six) Hours As Needed for Nausea or Vomiting.  Dispense: 30 tablet; Refill: 1  -     lidocaine-prilocaine (EMLA) 2.5-2.5 % cream; Apply to port-a-cath site 30 minutes prior to arrival at infusion center. Cover with plastic wrap.  Dispense:  30 g; Refill: 1  -     loratadine (CLARITIN) 10 MG tablet; Take 1 tablet by mouth weekly starting the day of growth factor injection.  Dispense: 7 tablet; Refill: 5      - Chemotherapy teaching was also completed today as documented above. Adequate time was given to answer all questions to her satisfaction. Patient and family are aware of their care team members and contact information if they have questions or problems throughout the treatment course. Needs assessments and education has been completed. The patient is adequately prepared to begin treatment as scheduled.   - Reviewed with patient education regarding EMLA cream, Compazine, and Zofran and prescriptions were sent to the pharmacy of patient's choice.  - I advised the patient that she can take Tylenol or Ibuprofen as needed for aches/pains related to cancer/treatment. I also advised patient that she could use Senakot or Miralax as needed for constipation or Imodium as needed for diarrhea.    - I reviewed with the patient the care team members. I also reviewed the option of the urgent care clinic through our oncology office for evaluation and management of symptoms related to treatment. Patient was provided with phone number to call during regular office hours [phone] press #1 and for treatment related questions call [phone] to speak to a nurse. If after hours or on the weekend call [phone] and ask to page the MD on call for [facility] Oncology services.   - Consent for treatment with Carboplatin/Paclitaxel/Pegfilgrastim as recommended by Dr. Vasquez for the treatment of ovarian cancer was signed by the patient today.         I spent 60 minutes with Jie Aragon today.  In the office today, more than 50% of this time was spent face-to-face with her  in counseling / coordination of care, reviewing her interim medical history and counseling on the current treatment plan.  All questions were answered to her  satisfaction.             Electronically Signed by: ESTHER Partida , June 26, 2024 12:04 EDT

## 2024-07-01 ENCOUNTER — LAB (OUTPATIENT)
Dept: ONCOLOGY | Facility: HOSPITAL | Age: 49
End: 2024-07-01
Payer: COMMERCIAL

## 2024-07-01 ENCOUNTER — SPECIALTY PHARMACY (OUTPATIENT)
Dept: PHARMACY | Facility: HOSPITAL | Age: 49
End: 2024-07-01
Payer: COMMERCIAL

## 2024-07-01 ENCOUNTER — INFUSION (OUTPATIENT)
Dept: ONCOLOGY | Facility: HOSPITAL | Age: 49
End: 2024-07-01
Payer: COMMERCIAL

## 2024-07-01 VITALS
BODY MASS INDEX: 20.16 KG/M2 | HEART RATE: 94 BPM | RESPIRATION RATE: 18 BRPM | WEIGHT: 136.5 LBS | TEMPERATURE: 97.5 F | SYSTOLIC BLOOD PRESSURE: 125 MMHG | DIASTOLIC BLOOD PRESSURE: 77 MMHG | OXYGEN SATURATION: 97 %

## 2024-07-01 DIAGNOSIS — N83.8 BILATERAL TUBO-OVARIAN MASS: ICD-10-CM

## 2024-07-01 DIAGNOSIS — R19.00 PELVIC MASS: ICD-10-CM

## 2024-07-01 DIAGNOSIS — C56.3 MALIGNANT NEOPLASM OF BOTH OVARIES: Primary | ICD-10-CM

## 2024-07-01 DIAGNOSIS — Z95.828 PORT-A-CATH IN PLACE: ICD-10-CM

## 2024-07-01 LAB
ALBUMIN SERPL-MCNC: 4.2 G/DL (ref 3.5–5.2)
ALBUMIN/GLOB SERPL: 1.4 G/DL
ALP SERPL-CCNC: 79 U/L (ref 39–117)
ALT SERPL W P-5'-P-CCNC: 8 U/L (ref 1–33)
ANION GAP SERPL CALCULATED.3IONS-SCNC: 11.9 MMOL/L (ref 5–15)
AST SERPL-CCNC: 14 U/L (ref 1–32)
BASOPHILS # BLD AUTO: 0.04 10*3/MM3 (ref 0–0.2)
BASOPHILS NFR BLD AUTO: 0.4 % (ref 0–1.5)
BILIRUB SERPL-MCNC: 0.3 MG/DL (ref 0–1.2)
BUN SERPL-MCNC: 11 MG/DL (ref 6–20)
BUN/CREAT SERPL: 13.8 (ref 7–25)
CALCIUM SPEC-SCNC: 9.4 MG/DL (ref 8.6–10.5)
CANCER AG125 SERPL QL: 12.1 U/ML (ref 0–38.1)
CHLORIDE SERPL-SCNC: 104 MMOL/L (ref 98–107)
CO2 SERPL-SCNC: 23.1 MMOL/L (ref 22–29)
CREAT SERPL-MCNC: 0.8 MG/DL (ref 0.57–1)
DEPRECATED RDW RBC AUTO: 45.7 FL (ref 37–54)
EGFRCR SERPLBLD CKD-EPI 2021: 90.5 ML/MIN/1.73
EOSINOPHIL # BLD AUTO: 0.17 10*3/MM3 (ref 0–0.4)
EOSINOPHIL NFR BLD AUTO: 1.8 % (ref 0.3–6.2)
ERYTHROCYTE [DISTWIDTH] IN BLOOD BY AUTOMATED COUNT: 14 % (ref 12.3–15.4)
GLOBULIN UR ELPH-MCNC: 3 GM/DL
GLUCOSE SERPL-MCNC: 121 MG/DL (ref 65–99)
HCT VFR BLD AUTO: 37.3 % (ref 34–46.6)
HGB BLD-MCNC: 12.5 G/DL (ref 12–15.9)
IMM GRANULOCYTES # BLD AUTO: 0.03 10*3/MM3 (ref 0–0.05)
IMM GRANULOCYTES NFR BLD AUTO: 0.3 % (ref 0–0.5)
LYMPHOCYTES # BLD AUTO: 1.89 10*3/MM3 (ref 0.7–3.1)
LYMPHOCYTES NFR BLD AUTO: 19.5 % (ref 19.6–45.3)
MCH RBC QN AUTO: 30 PG (ref 26.6–33)
MCHC RBC AUTO-ENTMCNC: 33.5 G/DL (ref 31.5–35.7)
MCV RBC AUTO: 89.7 FL (ref 79–97)
MONOCYTES # BLD AUTO: 0.56 10*3/MM3 (ref 0.1–0.9)
MONOCYTES NFR BLD AUTO: 5.8 % (ref 5–12)
NEUTROPHILS NFR BLD AUTO: 6.99 10*3/MM3 (ref 1.7–7)
NEUTROPHILS NFR BLD AUTO: 72.2 % (ref 42.7–76)
NRBC BLD AUTO-RTO: 0 /100 WBC (ref 0–0.2)
PLATELET # BLD AUTO: 313 10*3/MM3 (ref 140–450)
PMV BLD AUTO: 9.5 FL (ref 6–12)
POTASSIUM SERPL-SCNC: 3.6 MMOL/L (ref 3.5–5.2)
PROT SERPL-MCNC: 7.2 G/DL (ref 6–8.5)
RBC # BLD AUTO: 4.16 10*6/MM3 (ref 3.77–5.28)
SODIUM SERPL-SCNC: 139 MMOL/L (ref 136–145)
WBC NRBC COR # BLD AUTO: 9.68 10*3/MM3 (ref 3.4–10.8)

## 2024-07-01 PROCEDURE — 25010000002 HEPARIN LOCK FLUSH PER 10 UNITS

## 2024-07-01 PROCEDURE — 25010000002 PACLITAXEL PER 1 MG

## 2024-07-01 PROCEDURE — 25810000003 SODIUM CHLORIDE 0.9 % SOLUTION

## 2024-07-01 PROCEDURE — 25810000003 SODIUM CHLORIDE 0.9 % SOLUTION 500 ML FLEX CONT

## 2024-07-01 PROCEDURE — 85025 COMPLETE CBC W/AUTO DIFF WBC: CPT

## 2024-07-01 PROCEDURE — 80053 COMPREHEN METABOLIC PANEL: CPT

## 2024-07-01 PROCEDURE — 96413 CHEMO IV INFUSION 1 HR: CPT

## 2024-07-01 PROCEDURE — 25810000003 SODIUM CHLORIDE 0.9 % SOLUTION 250 ML FLEX CONT

## 2024-07-01 PROCEDURE — 96367 TX/PROPH/DG ADDL SEQ IV INF: CPT

## 2024-07-01 PROCEDURE — 25010000002 CARBOPLATIN PER 50 MG

## 2024-07-01 PROCEDURE — 25010000002 FOSAPREPITANT PER 1 MG

## 2024-07-01 PROCEDURE — 96415 CHEMO IV INFUSION ADDL HR: CPT

## 2024-07-01 PROCEDURE — 25010000002 DIPHENHYDRAMINE PER 50 MG

## 2024-07-01 PROCEDURE — 25010000002 DEXAMETHASONE SODIUM PHOSPHATE 20 MG/5ML SOLUTION

## 2024-07-01 PROCEDURE — 96417 CHEMO IV INFUS EACH ADDL SEQ: CPT

## 2024-07-01 PROCEDURE — 25010000002 PALONOSETRON 0.25 MG/5ML SOLUTION PREFILLED SYRINGE

## 2024-07-01 PROCEDURE — 86304 IMMUNOASSAY TUMOR CA 125: CPT

## 2024-07-01 PROCEDURE — 96375 TX/PRO/DX INJ NEW DRUG ADDON: CPT

## 2024-07-01 RX ORDER — SODIUM CHLORIDE 0.9 % (FLUSH) 0.9 %
10 SYRINGE (ML) INJECTION AS NEEDED
Status: DISCONTINUED | OUTPATIENT
Start: 2024-07-01 | End: 2024-07-01 | Stop reason: HOSPADM

## 2024-07-01 RX ORDER — SODIUM CHLORIDE 9 MG/ML
20 INJECTION, SOLUTION INTRAVENOUS ONCE
Status: COMPLETED | OUTPATIENT
Start: 2024-07-01 | End: 2024-07-01

## 2024-07-01 RX ORDER — SODIUM CHLORIDE 0.9 % (FLUSH) 0.9 %
10 SYRINGE (ML) INJECTION AS NEEDED
OUTPATIENT
Start: 2024-07-01

## 2024-07-01 RX ORDER — NICOTINE 21 MG/24HR
1 PATCH, TRANSDERMAL 24 HOURS TRANSDERMAL EVERY 24 HOURS
Qty: 30 PATCH | Refills: 0 | Status: SHIPPED | OUTPATIENT
Start: 2024-07-01

## 2024-07-01 RX ORDER — FAMOTIDINE 10 MG/ML
20 INJECTION, SOLUTION INTRAVENOUS ONCE
Status: COMPLETED | OUTPATIENT
Start: 2024-07-01 | End: 2024-07-01

## 2024-07-01 RX ORDER — HEPARIN SODIUM (PORCINE) LOCK FLUSH IV SOLN 100 UNIT/ML 100 UNIT/ML
500 SOLUTION INTRAVENOUS AS NEEDED
Status: DISCONTINUED | OUTPATIENT
Start: 2024-07-01 | End: 2024-07-01 | Stop reason: HOSPADM

## 2024-07-01 RX ORDER — FILGRASTIM 300 UG/.5ML
300 INJECTION, SOLUTION INTRAVENOUS; SUBCUTANEOUS EVERY EVENING
Qty: 5 ML | Refills: 0 | Status: SHIPPED | OUTPATIENT
Start: 2024-07-01 | End: 2024-07-03 | Stop reason: SDUPTHER

## 2024-07-01 RX ORDER — PALONOSETRON 0.05 MG/ML
0.25 INJECTION, SOLUTION INTRAVENOUS ONCE
Status: COMPLETED | OUTPATIENT
Start: 2024-07-01 | End: 2024-07-01

## 2024-07-01 RX ORDER — FAMOTIDINE 10 MG/ML
20 INJECTION, SOLUTION INTRAVENOUS AS NEEDED
Status: CANCELLED | OUTPATIENT
Start: 2024-07-01

## 2024-07-01 RX ORDER — DIPHENHYDRAMINE HYDROCHLORIDE 50 MG/ML
50 INJECTION INTRAMUSCULAR; INTRAVENOUS AS NEEDED
Status: CANCELLED | OUTPATIENT
Start: 2024-07-01

## 2024-07-01 RX ORDER — HEPARIN SODIUM (PORCINE) LOCK FLUSH IV SOLN 100 UNIT/ML 100 UNIT/ML
500 SOLUTION INTRAVENOUS AS NEEDED
OUTPATIENT
Start: 2024-07-01

## 2024-07-01 RX ADMIN — HEPARIN 500 UNITS: 100 SYRINGE at 14:43

## 2024-07-01 RX ADMIN — Medication 10 ML: at 14:43

## 2024-07-01 RX ADMIN — FAMOTIDINE 20 MG: 10 INJECTION, SOLUTION INTRAVENOUS at 09:15

## 2024-07-01 RX ADMIN — DIPHENHYDRAMINE HYDROCHLORIDE 50 MG: 50 INJECTION, SOLUTION INTRAMUSCULAR; INTRAVENOUS at 09:17

## 2024-07-01 RX ADMIN — DEXAMETHASONE SODIUM PHOSPHATE 20 MG: 4 INJECTION, SOLUTION INTRA-ARTICULAR; INTRALESIONAL; INTRAMUSCULAR; INTRAVENOUS; SOFT TISSUE at 09:31

## 2024-07-01 RX ADMIN — SODIUM CHLORIDE 150 MG: 9 INJECTION, SOLUTION INTRAVENOUS at 09:51

## 2024-07-01 RX ADMIN — PALONOSETRON 0.25 MG: 0.25 INJECTION, SOLUTION INTRAVENOUS at 09:17

## 2024-07-01 RX ADMIN — CARBOPLATIN 650 MG: 10 INJECTION, SOLUTION INTRAVENOUS at 13:53

## 2024-07-01 RX ADMIN — SODIUM CHLORIDE 20 ML/HR: 9 INJECTION, SOLUTION INTRAVENOUS at 09:15

## 2024-07-01 RX ADMIN — PACLITAXEL 300 MG: 6 INJECTION, SOLUTION INTRAVENOUS at 10:36

## 2024-07-01 NOTE — TELEPHONE ENCOUNTER
Sent pt a AMX message letting her know this medication was for post operative purposes and Dr. Dubois will not be refilling it

## 2024-07-02 ENCOUNTER — TELEPHONE (OUTPATIENT)
Dept: GENETICS | Facility: HOSPITAL | Age: 49
End: 2024-07-02
Payer: COMMERCIAL

## 2024-07-02 DIAGNOSIS — C56.3 MALIGNANT NEOPLASM OF BOTH OVARIES: ICD-10-CM

## 2024-07-02 RX ORDER — ONDANSETRON HYDROCHLORIDE 8 MG/1
8 TABLET, FILM COATED ORAL 3 TIMES DAILY PRN
Qty: 30 TABLET | Refills: 5 | Status: SHIPPED | OUTPATIENT
Start: 2024-07-02

## 2024-07-02 NOTE — TELEPHONE ENCOUNTER
Caller: Mahesh Jie Yanelis    Relationship: Self    Best call back number: [phone]    Requested Prescriptions:   Requested Prescriptions     Pending Prescriptions Disp Refills    ondansetron (ZOFRAN) 8 MG tablet 30 tablet 5     Sig: Take 1 tablet by mouth 3 (Three) Times a Day As Needed for Nausea or Vomiting.        Pharmacy where request should be sent: [facility] [place] [address] [address] - [phone]  - [phone]     Last office visit with prescribing clinician: Visit date not found   Last telemedicine visit with prescribing clinician: Visit date not found   Next office visit with prescribing clinician: 7/22/2024     Additional details provided by patient: PT REQUESTING FOR THIS PRESCRIPTION TO BE RESENT TO [facility], ALSO REQUESTING A CALL BACK IN REGARDS TO HER CLARITIN MEDICATION    Does the patient have less than a 3 day supply:  [] Yes  [x] No    Would you like a call back once the refill request has been completed: [] Yes [x] No    If the office needs to give you a call back, can they leave a voicemail: [] Yes [x] No

## 2024-07-02 NOTE — TELEPHONE ENCOUNTER
RN spoke with patient who had concerns regarding how long she is supposed to take Neupogen, when she needs to take the Claritin, and if her Zofran can be sent to [place] pharmacy, as it was left off when she transferred her medication from [facility] pharmacy.     RN discussed with Dr. Vasquez who wants patient to take Neupogen for 7 days starting on the day after treatment. Patient received medication for 10 days, so she may save the extra. MD said patient does not need to take Claritin on set days, that this can be taken as needed for bone pain.     RN relayed this to patient who verbalized understanding. RN encouraged her to please call us with any further questions or concerns.

## 2024-07-02 NOTE — TELEPHONE ENCOUNTER
Patient pursued CancerNext genetic testing through the Ovarian Rapid Access program on 6/12/2024 (labs drawn 6/21 in [place]). Genetic testing was negative for pathogenic mutations in the 36 genes on the CancerNext panel. These normal results were discussed with the patient by telephone on 7/2/2024. I informed the patient these results would by listed on her [employer] account and sent to her provider. Patient denied having any questions at this time. She is encouraged to call back with any questions that she may have.

## 2024-07-03 ENCOUNTER — SPECIALTY PHARMACY (OUTPATIENT)
Dept: PHARMACY | Facility: HOSPITAL | Age: 49
End: 2024-07-03
Payer: COMMERCIAL

## 2024-07-03 RX ORDER — FILGRASTIM 300 UG/.5ML
300 INJECTION, SOLUTION INTRAVENOUS; SUBCUTANEOUS EVERY EVENING
Qty: 3.5 ML | Refills: 4 | Status: SHIPPED | OUTPATIENT
Start: 2024-07-03 | End: 2024-07-08

## 2024-07-08 ENCOUNTER — DOCUMENTATION (OUTPATIENT)
Dept: ONCOLOGY | Facility: CLINIC | Age: 49
End: 2024-07-08
Payer: COMMERCIAL

## 2024-07-08 ENCOUNTER — TELEPHONE (OUTPATIENT)
Dept: ONCOLOGY | Facility: CLINIC | Age: 49
End: 2024-07-08
Payer: COMMERCIAL

## 2024-07-08 RX ORDER — SERTRALINE HYDROCHLORIDE 25 MG/1
25 TABLET, FILM COATED ORAL DAILY
Qty: 14 TABLET | Refills: 0 | Status: SHIPPED | OUTPATIENT
Start: 2024-07-08 | End: 2024-07-22

## 2024-07-08 NOTE — TELEPHONE ENCOUNTER
RN discussed with MD who is prescribing patient Zoloft 25 mg to take daily for 2 weeks. RN relayed this information to the patient and encouraged her to take the medication to see if that helps and if she is tolerating it well, MD may increase her dose at next office visit. She verbalized understanding.

## 2024-07-08 NOTE — TELEPHONE ENCOUNTER
"RN spoke with patient who reported ever since she began the Neupogen shots, she has felt poorly. She reported experiencing fatigue, waking up in the middle of the night with nausea and unable to fall back asleep, leg pain despite taking Claritin, and headache for the past 3 days. She reported that her leg pain feels more like \"restless legs\" than bone pain and that the Claritin did not make a difference. She reported her headache was dull but tylenol was successful in alleviating the pain. She reported only taking 5 shots of Neupogen and skipped her 6th shot last night and has felt so much better this morning. She said she did not wake up last night and feels like she has more energy today.  Patient denies any diarrhea or fever and reports she is doing well with oral intake.     RN informed patient that, unfortunately, some of these symptoms are expected while undergoing chemotherapy but that I would speak with MD to get her recommendations and call patient back.     RN discussed with MD who recommended since she has not well since holding the Neupogen shot, to continue to hold it and have patient call us back to see how she is feeling tomorrow. If she is not any better, patient should come in for an acute visit to rule out infection and for further evaluation. RN relayed this information to the patient and she verbalized understanding. When speaking with patient about plan of care, she voiced that she has been tearful \"for no reason\" the last few days. RN empathized and voiced understanding that she is going through a very hard time, but there are people to support her through this. RN stated I would speak with MD about that as well. RN also provided her with information regarding cancer support groups and that we can also refer her to behavioral health so she could speak with someone if she wanted.Patient thinks she would benefit from some sort of medication to help her through this process and said she would let " us know if she would like a referral in the future.

## 2024-07-08 NOTE — PROGRESS NOTES
SS spoke with patient as initial encounter. The role of SS was introduced and contact information was provided.     Please refer to the Social Work flowsheet for assessment details.    SS provided patient with contact information and encouraged patient to call with any questions, concerns, or needs.     Patient verbalized understanding and agreed with resource assistance and plan.    SS will follow and assist as needed.

## 2024-07-08 NOTE — TELEPHONE ENCOUNTER
Having a lot of side effects  from the shot she has been taking. She said, her legs have been hurting,headache , nausea . Has been really fatigue. She said, she didn't take the shot last night and she said, she slept better last night. She thinks it is the shot making her feel bad. She is requesting for Gladys to give her a call back

## 2024-07-18 DIAGNOSIS — C56.3 MALIGNANT NEOPLASM OF BOTH OVARIES: Primary | ICD-10-CM

## 2024-07-18 RX ORDER — PALONOSETRON 0.05 MG/ML
0.25 INJECTION, SOLUTION INTRAVENOUS ONCE
Status: CANCELLED | OUTPATIENT
Start: 2024-07-22

## 2024-07-18 RX ORDER — FAMOTIDINE 10 MG/ML
20 INJECTION, SOLUTION INTRAVENOUS ONCE
Status: CANCELLED | OUTPATIENT
Start: 2024-07-22

## 2024-07-18 RX ORDER — DIPHENHYDRAMINE HYDROCHLORIDE 50 MG/ML
50 INJECTION INTRAMUSCULAR; INTRAVENOUS AS NEEDED
Status: CANCELLED | OUTPATIENT
Start: 2024-07-22

## 2024-07-18 RX ORDER — SODIUM CHLORIDE 9 MG/ML
20 INJECTION, SOLUTION INTRAVENOUS ONCE
Status: CANCELLED | OUTPATIENT
Start: 2024-07-22

## 2024-07-18 RX ORDER — FAMOTIDINE 10 MG/ML
20 INJECTION, SOLUTION INTRAVENOUS AS NEEDED
Status: CANCELLED | OUTPATIENT
Start: 2024-07-22

## 2024-07-22 ENCOUNTER — INFUSION (OUTPATIENT)
Dept: ONCOLOGY | Facility: HOSPITAL | Age: 49
End: 2024-07-22
Payer: COMMERCIAL

## 2024-07-22 ENCOUNTER — LAB (OUTPATIENT)
Dept: ONCOLOGY | Facility: CLINIC | Age: 49
End: 2024-07-22
Payer: COMMERCIAL

## 2024-07-22 ENCOUNTER — OFFICE VISIT (OUTPATIENT)
Dept: ONCOLOGY | Facility: CLINIC | Age: 49
End: 2024-07-22
Payer: COMMERCIAL

## 2024-07-22 VITALS
DIASTOLIC BLOOD PRESSURE: 80 MMHG | BODY MASS INDEX: 21.48 KG/M2 | RESPIRATION RATE: 20 BRPM | HEART RATE: 91 BPM | SYSTOLIC BLOOD PRESSURE: 117 MMHG | HEIGHT: 69 IN | OXYGEN SATURATION: 100 % | WEIGHT: 145 LBS | TEMPERATURE: 98 F

## 2024-07-22 VITALS
HEART RATE: 115 BPM | RESPIRATION RATE: 18 BRPM | TEMPERATURE: 97.3 F | DIASTOLIC BLOOD PRESSURE: 71 MMHG | SYSTOLIC BLOOD PRESSURE: 114 MMHG | OXYGEN SATURATION: 97 %

## 2024-07-22 DIAGNOSIS — C56.3 MALIGNANT NEOPLASM OF BOTH OVARIES: ICD-10-CM

## 2024-07-22 DIAGNOSIS — C56.3 MALIGNANT NEOPLASM OF BOTH OVARIES: Primary | ICD-10-CM

## 2024-07-22 DIAGNOSIS — Z95.828 PORT-A-CATH IN PLACE: ICD-10-CM

## 2024-07-22 DIAGNOSIS — G62.9 NEUROPATHY: ICD-10-CM

## 2024-07-22 LAB
ALBUMIN SERPL-MCNC: 4.1 G/DL (ref 3.5–5.2)
ALBUMIN/GLOB SERPL: 1.2 G/DL
ALP SERPL-CCNC: 171 U/L (ref 39–117)
ALT SERPL W P-5'-P-CCNC: 41 U/L (ref 1–33)
ANION GAP SERPL CALCULATED.3IONS-SCNC: 10.7 MMOL/L (ref 5–15)
AST SERPL-CCNC: 30 U/L (ref 1–32)
BASOPHILS # BLD AUTO: 0.06 10*3/MM3 (ref 0–0.2)
BASOPHILS NFR BLD AUTO: 0.7 % (ref 0–1.5)
BILIRUB SERPL-MCNC: 0.2 MG/DL (ref 0–1.2)
BUN SERPL-MCNC: 13 MG/DL (ref 6–20)
BUN/CREAT SERPL: 18.8 (ref 7–25)
CALCIUM SPEC-SCNC: 9.7 MG/DL (ref 8.6–10.5)
CANCER AG125 SERPL QL: 12.1 U/ML (ref 0–38.1)
CHLORIDE SERPL-SCNC: 103 MMOL/L (ref 98–107)
CO2 SERPL-SCNC: 24.3 MMOL/L (ref 22–29)
CREAT SERPL-MCNC: 0.69 MG/DL (ref 0.57–1)
DEPRECATED RDW RBC AUTO: 51.8 FL (ref 37–54)
EGFRCR SERPLBLD CKD-EPI 2021: 106.5 ML/MIN/1.73
EOSINOPHIL # BLD AUTO: 0.06 10*3/MM3 (ref 0–0.4)
EOSINOPHIL NFR BLD AUTO: 0.7 % (ref 0.3–6.2)
ERYTHROCYTE [DISTWIDTH] IN BLOOD BY AUTOMATED COUNT: 15.4 % (ref 12.3–15.4)
GLOBULIN UR ELPH-MCNC: 3.4 GM/DL
GLUCOSE SERPL-MCNC: 106 MG/DL (ref 65–99)
HCT VFR BLD AUTO: 38.5 % (ref 34–46.6)
HGB BLD-MCNC: 12.7 G/DL (ref 12–15.9)
IMM GRANULOCYTES # BLD AUTO: 0.02 10*3/MM3 (ref 0–0.05)
IMM GRANULOCYTES NFR BLD AUTO: 0.2 % (ref 0–0.5)
LYMPHOCYTES # BLD AUTO: 1.66 10*3/MM3 (ref 0.7–3.1)
LYMPHOCYTES NFR BLD AUTO: 20.4 % (ref 19.6–45.3)
MCH RBC QN AUTO: 30.5 PG (ref 26.6–33)
MCHC RBC AUTO-ENTMCNC: 33 G/DL (ref 31.5–35.7)
MCV RBC AUTO: 92.5 FL (ref 79–97)
MONOCYTES # BLD AUTO: 0.64 10*3/MM3 (ref 0.1–0.9)
MONOCYTES NFR BLD AUTO: 7.9 % (ref 5–12)
NEUTROPHILS NFR BLD AUTO: 5.68 10*3/MM3 (ref 1.7–7)
NEUTROPHILS NFR BLD AUTO: 70.1 % (ref 42.7–76)
NRBC BLD AUTO-RTO: 0 /100 WBC (ref 0–0.2)
PLATELET # BLD AUTO: 331 10*3/MM3 (ref 140–450)
PMV BLD AUTO: 9 FL (ref 6–12)
POTASSIUM SERPL-SCNC: 4.3 MMOL/L (ref 3.5–5.2)
PROT SERPL-MCNC: 7.5 G/DL (ref 6–8.5)
RBC # BLD AUTO: 4.16 10*6/MM3 (ref 3.77–5.28)
SODIUM SERPL-SCNC: 138 MMOL/L (ref 136–145)
WBC NRBC COR # BLD AUTO: 8.12 10*3/MM3 (ref 3.4–10.8)

## 2024-07-22 PROCEDURE — 1126F AMNT PAIN NOTED NONE PRSNT: CPT | Performed by: INTERNAL MEDICINE

## 2024-07-22 PROCEDURE — 96367 TX/PROPH/DG ADDL SEQ IV INF: CPT

## 2024-07-22 PROCEDURE — 25010000002 CARBOPLATIN PER 50 MG: Performed by: INTERNAL MEDICINE

## 2024-07-22 PROCEDURE — 96377 APPLICATON ON-BODY INJECTOR: CPT

## 2024-07-22 PROCEDURE — 86304 IMMUNOASSAY TUMOR CA 125: CPT | Performed by: INTERNAL MEDICINE

## 2024-07-22 PROCEDURE — 85025 COMPLETE CBC W/AUTO DIFF WBC: CPT | Performed by: INTERNAL MEDICINE

## 2024-07-22 PROCEDURE — 25810000003 SODIUM CHLORIDE 0.9 % SOLUTION 250 ML FLEX CONT: Performed by: INTERNAL MEDICINE

## 2024-07-22 PROCEDURE — 25010000002 PALONOSETRON 0.25 MG/5ML SOLUTION PREFILLED SYRINGE: Performed by: INTERNAL MEDICINE

## 2024-07-22 PROCEDURE — 96413 CHEMO IV INFUSION 1 HR: CPT

## 2024-07-22 PROCEDURE — 25010000002 HEPARIN LOCK FLUSH PER 10 UNITS

## 2024-07-22 PROCEDURE — 96415 CHEMO IV INFUSION ADDL HR: CPT

## 2024-07-22 PROCEDURE — 25810000003 SODIUM CHLORIDE 0.9 % SOLUTION: Performed by: INTERNAL MEDICINE

## 2024-07-22 PROCEDURE — 25010000002 PACLITAXEL PER 1 MG: Performed by: INTERNAL MEDICINE

## 2024-07-22 PROCEDURE — 25810000003 SODIUM CHLORIDE 0.9 % SOLUTION 500 ML FLEX CONT: Performed by: INTERNAL MEDICINE

## 2024-07-22 PROCEDURE — 80053 COMPREHEN METABOLIC PANEL: CPT | Performed by: INTERNAL MEDICINE

## 2024-07-22 PROCEDURE — 96417 CHEMO IV INFUS EACH ADDL SEQ: CPT

## 2024-07-22 PROCEDURE — 99214 OFFICE O/P EST MOD 30 MIN: CPT | Performed by: INTERNAL MEDICINE

## 2024-07-22 PROCEDURE — 25010000002 FOSAPREPITANT PER 1 MG: Performed by: INTERNAL MEDICINE

## 2024-07-22 PROCEDURE — 25010000002 DIPHENHYDRAMINE PER 50 MG: Performed by: INTERNAL MEDICINE

## 2024-07-22 PROCEDURE — 25010000002 PEGFILGRASTIM-CBQV 6 MG/0.6ML SOLUTION PREFILLED SYRINGE: Performed by: INTERNAL MEDICINE

## 2024-07-22 PROCEDURE — 25010000002 DEXAMETHASONE SODIUM PHOSPHATE 20 MG/5ML SOLUTION: Performed by: INTERNAL MEDICINE

## 2024-07-22 PROCEDURE — 96374 THER/PROPH/DIAG INJ IV PUSH: CPT

## 2024-07-22 PROCEDURE — 96375 TX/PRO/DX INJ NEW DRUG ADDON: CPT

## 2024-07-22 RX ORDER — SODIUM CHLORIDE 0.9 % (FLUSH) 0.9 %
10 SYRINGE (ML) INJECTION AS NEEDED
Status: DISCONTINUED | OUTPATIENT
Start: 2024-07-22 | End: 2024-07-22 | Stop reason: HOSPADM

## 2024-07-22 RX ORDER — GABAPENTIN 100 MG/1
100 CAPSULE ORAL NIGHTLY PRN
Qty: 30 CAPSULE | Refills: 3 | Status: SHIPPED | OUTPATIENT
Start: 2024-07-22

## 2024-07-22 RX ORDER — FAMOTIDINE 10 MG/ML
20 INJECTION, SOLUTION INTRAVENOUS ONCE
Status: COMPLETED | OUTPATIENT
Start: 2024-07-22 | End: 2024-07-22

## 2024-07-22 RX ORDER — PALONOSETRON 0.05 MG/ML
0.25 INJECTION, SOLUTION INTRAVENOUS ONCE
Status: COMPLETED | OUTPATIENT
Start: 2024-07-22 | End: 2024-07-22

## 2024-07-22 RX ORDER — SODIUM CHLORIDE 9 MG/ML
20 INJECTION, SOLUTION INTRAVENOUS ONCE
Status: COMPLETED | OUTPATIENT
Start: 2024-07-22 | End: 2024-07-22

## 2024-07-22 RX ORDER — PYRIDOXINE HCL (VITAMIN B6) 50 MG
50 TABLET ORAL DAILY
Qty: 30 TABLET | Refills: 3 | Status: SHIPPED | OUTPATIENT
Start: 2024-07-22

## 2024-07-22 RX ORDER — SODIUM CHLORIDE 0.9 % (FLUSH) 0.9 %
10 SYRINGE (ML) INJECTION AS NEEDED
OUTPATIENT
Start: 2024-07-22

## 2024-07-22 RX ORDER — HEPARIN SODIUM (PORCINE) LOCK FLUSH IV SOLN 100 UNIT/ML 100 UNIT/ML
500 SOLUTION INTRAVENOUS AS NEEDED
Status: DISCONTINUED | OUTPATIENT
Start: 2024-07-22 | End: 2024-07-22 | Stop reason: HOSPADM

## 2024-07-22 RX ORDER — LORATADINE 10 MG/1
TABLET ORAL
Qty: 30 TABLET | Refills: 5 | Status: SHIPPED | OUTPATIENT
Start: 2024-07-22

## 2024-07-22 RX ORDER — HEPARIN SODIUM (PORCINE) LOCK FLUSH IV SOLN 100 UNIT/ML 100 UNIT/ML
500 SOLUTION INTRAVENOUS AS NEEDED
OUTPATIENT
Start: 2024-07-22

## 2024-07-22 RX ADMIN — FOSAPREPITANT 150 MG: 150 INJECTION, POWDER, LYOPHILIZED, FOR SOLUTION INTRAVENOUS at 10:49

## 2024-07-22 RX ADMIN — PALONOSETRON 0.25 MG: 0.25 INJECTION, SOLUTION INTRAVENOUS at 10:14

## 2024-07-22 RX ADMIN — DEXAMETHASONE SODIUM PHOSPHATE 20 MG: 4 INJECTION, SOLUTION INTRA-ARTICULAR; INTRALESIONAL; INTRAMUSCULAR; INTRAVENOUS; SOFT TISSUE at 10:27

## 2024-07-22 RX ADMIN — CARBOPLATIN 760 MG: 10 INJECTION, SOLUTION INTRAVENOUS at 15:24

## 2024-07-22 RX ADMIN — HEPARIN 500 UNITS: 100 SYRINGE at 16:00

## 2024-07-22 RX ADMIN — PEGFILGRASTIM-CBQV 6 MG: 6 INJECTION, SOLUTION SUBCUTANEOUS at 16:03

## 2024-07-22 RX ADMIN — PACLITAXEL 315 MG: 6 INJECTION, SOLUTION INTRAVENOUS at 11:47

## 2024-07-22 RX ADMIN — DIPHENHYDRAMINE HYDROCHLORIDE 50 MG: 50 INJECTION, SOLUTION INTRAMUSCULAR; INTRAVENOUS at 10:15

## 2024-07-22 RX ADMIN — Medication 10 ML: at 16:00

## 2024-07-22 RX ADMIN — SODIUM CHLORIDE 20 ML/HR: 9 INJECTION, SOLUTION INTRAVENOUS at 10:12

## 2024-07-22 RX ADMIN — FAMOTIDINE 20 MG: 10 INJECTION INTRAVENOUS at 10:12

## 2024-07-22 NOTE — PROGRESS NOTES
Subjective     Date: 7/22/2024    Referring Provider  No ref. provider found    Chief Complaint  Malignant neoplasm of both ovaries      Cancer Staging   FIGO Stage II (cT2, cN0, cM0)  Stage IC (pT1c2, pN0, cM0)      Subjective          Jie Aragon is a 48 y.o. female who presents today to [facility] for follow up.     HPI:   48 y.o. 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     Oncology history:  April 2024: Palpated right lower abdominal mass while applying lotion in tanning salon. This was associated with abdominal pain, bloating, and nausea. Care was delayed because patient did not have insurance at the time. Eventually went to PCP who ordered CT imaging of the abdomen.  May 8 2024: CT imaging R mass 17 x 13 cm and left 11x5 cm with septations, likely ovarian.  65  May 20 2024: Seen by Dr. Dubois for consultation.   May 23 2024: Underwent exploratory laparotomy, total abdominal hysterectomy, bilateral salpingo oophorectomy, omentectomy, appendectomy. Final pathology with left ovary with high grade serous carcinoma 9.5 cm confined to the ovary with no surface involvement identified, Right ovary with involved by high grade serous carcinoma, confined to the ovary. Fallopian tube with no change. Lymph nodes and omentum negative for malignancy. Uterus with high grade squamous intraepithelial lesion ALEX-3.   Final pathology pT1c and pN0 (ovarian ruptured at time of surgery).     She presents today with her daughter in law, Donna. Ms. Aragon is healing well without any post op complications. She is a smoker, smokes 1 pack/day X 30+ years, denies alcohol or drug use. Has family history of paternal grandfather with brain tumor, half brother with leukemia, and son with testicular cancer. She has three children 2 daughters and 1 son ranging from 26-31.      Treatment History:          Interval History 07/22/2024   Ms. Aragon presents today prior to C2 of adjuvant carbo/taxol.   [facility] genetic testing is negative for BRCA1/BRCA2.   She reports no NVDC with C1, did have significant myalgia with filgrastim injection which subsided after discontinuation.   Lost all hair after C1. Was emotional, currently on sertaline 25 mg QD with good tolerance. Denies constant neuropathy, admits to having intermittent numbness of her fingers.        Objective     Objective     Allergy:   Allergies   Allergen Reactions   • Amoxicillin Other (See Comments)     unknown   • Penicillins Other (See Comments)     unknown        Current Medications:   Current Outpatient Medications   Medication Sig Dispense Refill   • acetaminophen (TYLENOL) 325 MG tablet Take 2 tablets by mouth Every 6 Hours. 30 tablet 1   • docusate sodium (COLACE) 100 MG capsule Take 2 capsules by mouth 2 (Two) Times a Day As Needed for Constipation. Two capusles 60 capsule 3   • lidocaine-prilocaine (EMLA) 2.5-2.5 % cream Apply to port-a-cath site 30 minutes prior to arrival at infusion center. Cover with plastic wrap. 30 g 1   • loratadine (CLARITIN) 10 MG tablet Take 1 tablet by mouth weekly starting the day of growth factor injection. 30 tablet 5   • naloxone (NARCAN) 4 MG/0.1ML nasal spray Call 911. Don't prime. [place] in 1 nostril for overdose. Repeat in 2-3 minutes in other nostril if no or minimal breathing/responsiveness. 2 each 0   • nicotine (Nicotine Step 1) 21 MG/24HR patch Place 1 patch on the skin as directed by provider Daily. 30 patch 0   • nicotine (Nicotine Step 2) 14 MG/24HR patch Place 1 patch on the skin as directed by provider Daily. 30 patch 0   • nicotine (Nicotine Step 3) 7 MG/24HR patch Place 1 patch on the skin as directed by provider Daily. 30 patch 0   • OLANZapine (ZyPREXA) 5 MG tablet Take 1 tablet by mouth Every Night. Take nightly x 4 starting night of chemotherapy. 4 tablet 5   •  ondansetron (ZOFRAN) 8 MG tablet Take 1 tablet by mouth 3 (Three) Times a Day As Needed for Nausea or Vomiting. 30 tablet 5   • oxyCODONE (OXY-IR) 5 MG capsule Take 1 capsule by mouth As Needed for Moderate Pain.     • polyethylene glycol (MIRALAX) 17 GM/SCOOP powder Take 17 g by mouth Daily. 510 g 0   • prochlorperazine (COMPAZINE) 10 MG tablet Take 1 tablet by mouth Every 6 (Six) Hours As Needed for Nausea or Vomiting. 30 tablet 1   • rivaroxaban (Xarelto) 10 MG tablet Take 1 tablet by mouth Daily. 30 tablet 0   • gabapentin (NEURONTIN) 100 MG capsule Take 1 capsule by mouth At Night As Needed (neuropathy). 30 capsule 3   • pyridoxine (VITAMIN B-6) 50 MG tablet tablet Take 1 tablet by mouth Daily. 30 tablet 3   • sertraline (Zoloft) 50 MG tablet Take 1 tablet by mouth Daily. 30 tablet 3     No current facility-administered medications for this visit.       Past Medical History:  Past Medical History:   Diagnosis Date   • Abdominal pain    • Change in vision    • Chills    • Cough    • Difficulty breathing    • Fatigue    • Multiple gestation    • Nausea    • Night sweats    • Ovarian cancer 5/24/2024   • Poor appetite    • Urinary tract infection    • Wears dentures     UPPER AND LOWER   • Weight gain        Past Surgical History:  Past Surgical History:   Procedure Laterality Date   • APPENDECTOMY N/A 5/23/2024    Procedure: APPENDECTOMY;  Surgeon: Shanda Dubois MD;  Location: [facility];  Service: Gynecology Oncology;  Laterality: N/A;   • CYSTOSCOPY Bilateral 5/23/2024    Procedure: CYSTOSCOPY TEMPORARY PLACEMENT BILATERAL URETERAL CATHETER;  Surgeon: Shanda Dubois MD;  Location: [facility] OR;  Service: Gynecology Oncology;  Laterality: Bilateral;   • EXPLORATORY LAPAROTOMY, TOTAL ABDOMINAL HYSTERECTOMY SALPINGO OOPHORECTOMY N/A 5/23/2024    Procedure: EXPLORATORY LAPAROTOMY, TOTAL ABDOMINAL HYSTERECTOMY BILATERAL SALPINGO OOPHORECTOMY, CANCER STAGING/ OPTIMAL DEBULKLING (R=0);  Surgeon: Shanda Dubois  "MD WILDER;  Location:  [facility];  Service: Gynecology Oncology;  Laterality: N/A;   • OMENTECTOMY N/A 5/23/2024    Procedure: OMENTECTOMY;  Surgeon: Shanda Dubois MD;  Location:  [facility];  Service: Gynecology Oncology;  Laterality: N/A;   • PORTACATH PLACEMENT Right 6/24/2024    Procedure: INSERTION OF PORTACATH;  Surgeon: Yakov Chung MD;  Location:  [facility];  Service: General;  Laterality: Right;   • TUBAL ABDOMINAL LIGATION         Social History:  Social History     Socioeconomic History   • Marital status: Single   Tobacco Use   • Smoking status: Every Day     Current packs/day: 1.00     Average packs/day: 1 pack/day for 15.0 years (15.0 ttl pk-yrs)     Types: Cigarettes     Passive exposure: Current   • Smokeless tobacco: Never   Vaping Use   • Vaping status: Never Used   Substance and Sexual Activity   • Alcohol use: Never   • Drug use: Never   • Sexual activity: Yes     Partners: Male     Birth control/protection: I.U.D., Tubal ligation     Jie Aragon  reports that she has been smoking cigarettes. She has a 15 pack-year smoking history. She has been exposed to tobacco smoke. She has never used smokeless tobacco. I have educated her on the risk of diseases from using tobacco products such as cancer, COPD, and heart disease.     I advised her to quit and she is not willing to quit.    I spent 3  minutes counseling the patient.         Family History:  Family History   Problem Relation Age of Onset   • Stroke Father    • Hypertension Father    • Heart attack Father    • Deep vein thrombosis Mother    • Leukemia Brother    • Testicular cancer Son        Review of Systems:  Review of Systems   All other systems reviewed and are negative.      Vital Signs:   /80   Pulse 91   Temp 98 °F (36.7 °C) (Temporal)   Resp 20   Ht 175.3 cm (69\")   Wt 65.8 kg (145 lb)   SpO2 100%   BMI 21.41 kg/m²      Physical Exam:  Physical Exam  Vitals reviewed.   Constitutional:       General: She is " "not in acute distress.     Appearance: Normal appearance. She is not ill-appearing.   HENT:      Head: Normocephalic and atraumatic.      Mouth/Throat:      Mouth: Mucous membranes are moist.      Pharynx: Oropharynx is clear.   Eyes:      Conjunctiva/sclera: Conjunctivae normal.      Pupils: Pupils are equal, round, and reactive to light.   Cardiovascular:      Rate and Rhythm: Normal rate and regular rhythm.      Heart sounds: No murmur heard.  Pulmonary:      Effort: Pulmonary effort is normal. No respiratory distress.      Breath sounds: Normal breath sounds. No wheezing.   Chest:      Comments: +R chest port  Abdominal:      General: Abdomen is flat. Bowel sounds are normal. There is no distension.      Palpations: Abdomen is soft. There is no mass.      Tenderness: There is no abdominal tenderness. There is no guarding.   Musculoskeletal:         General: No swelling. Normal range of motion.      Cervical back: Normal range of motion.   Lymphadenopathy:      Cervical: No cervical adenopathy.   Skin:     General: Skin is warm and dry.   Neurological:      General: No focal deficit present.      Mental Status: She is alert and oriented to person, place, and time. Mental status is at baseline.   Psychiatric:         Mood and Affect: Mood normal.         PHQ-9 Score  PHQ-9 Total Score:       Pain Score  Vitals:    07/22/24 0845   BP: 117/80   Pulse: 91   Resp: 20   Temp: 98 °F (36.7 °C)   TempSrc: Temporal   SpO2: 100%   Weight: 65.8 kg (145 lb)   Height: 175.3 cm (69\")   PainSc: 0-No pain         ECOG score: 0           PAINSCOREQUALITYMETRIC:   Vitals:    07/22/24 0845   PainSc: 0-No pain            Lab Review  Lab Results   Component Value Date    WBC 8.12 07/22/2024    HGB 12.7 07/22/2024    HCT 38.5 07/22/2024    MCV 92.5 07/22/2024    RDW 15.4 07/22/2024     07/22/2024    NEUTRORELPCT 70.1 07/22/2024    LYMPHORELPCT 20.4 07/22/2024    MONORELPCT 7.9 07/22/2024    EOSRELPCT 0.7 07/22/2024    BASORELPCT " 0.7 07/22/2024    NEUTROABS 5.68 07/22/2024    LYMPHSABS 1.66 07/22/2024     Lab Results   Component Value Date     07/22/2024    K 4.3 07/22/2024    CO2 24.3 07/22/2024     07/22/2024    BUN 13 07/22/2024    CREATININE 0.69 07/22/2024    GLUCOSE 106 (H) 07/22/2024    CALCIUM 9.7 07/22/2024    ALKPHOS 171 (H) 07/22/2024    AST 30 07/22/2024    ALT 41 (H) 07/22/2024    BILITOT 0.2 07/22/2024    ALBUMIN 4.1 07/22/2024    PROTEINTOT 7.5 07/22/2024    MG 1.8 05/26/2024         Radiology Results  CT Angiogram Chest Pulmonary Embolism (05/26/2024 07:45)   FINDINGS:  Contrast bolus timing is satisfactory to evaluate for pulmonary  embolism.  Pulmonary arteries: normal.  No pulmonary arterial filling defect is  seen.  Heart and pericardium: normal.  Aorta: normal.  Esophagus: normal.  Lungs and airways: Linear bibasilar atelectasis.  Peripheral emphysema  and cysts..  Pleura: normal.  Lymph nodes: normal.  Musculoskeletal and chest wall: Free air in the abdomen is postsurgical  most likely  Other: n/a     IMPRESSION:  NEGATIVE FOR PULMONARY EMBOLISM.  LINEAR BIBASILAR ATELECTASIS.  FREE AIR IN THE ABDOMEN IS LIKELY POSTSURGICAL    No acute cardiopulmonary process.    Scanned Imaging   CT Abdomen/Pelvis with Contrast (05/08/2024)           Pathology:   05/23/2024              Genetic Testing          Assessment / Plan         Assessment and Plan   Jie Aragon is a 48 y.o. 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 PD-L1 <1%   Cancer Staging   FIGO Stage II (cT2, cN0, cM0)  Stage IC (pT1c2, pN0, cM0)  -patient presents with palpation of RLQ abdominal mass, associated with abdominal pain, bloating, and nausea for ~1 months prior to CT A/P. CT A/P with R mass 17 cm and L mass 11 cm c/f ovarian cancer. : 65  -5/2024 Underwent exploratory laparotomy, total abdominal hysterectomy, bilateral salpingo oophorectomy, omentectomy, appendectomy by Dr. Dubois at [facility]. Final pathology  with left ovary with high grade serous carcinoma 9.5 cm confined to the ovary with no surface involvement identified, Right ovary with involved by high grade serous carcinoma, confined to the ovary. Fallopian tube with no change. Lymph nodes and omentum negative for malignancy. Uterus with high grade squamous intraepithelial lesion ALEX-3.   Final pathology pT1c and pN0 (ovarian ruptured at time of surgery).   -We reviewed the NCCN guidelines which indicate adjuvant chemotherapy with carboplatin AUC 5-6 D1 and paclitaxel 175 mg/m2 D1 every 21 days for 6 cycles.  -C2 7/22- proceed today    2. Malignancy associated pain  -Currently denies     3. Nutrition   - Recommend high calories/protein diet during the treatment     4. Chemotherapy induced neuropathy   - Admits to intermittent neuropathy of b/l fingers  - Started on B6 and QHS gabapentin 100 mg    5. Depression   - Started patient on sertraline 25 mg QD with good tolerance, increased to 50 mg QD today       Discussed possible differential diagnoses, testing, treatment, recommended non-pharmacological interventions, risks, warning signs to monitor for that would indicate need for follow-up in clinic or ER. If no improvement with these regimens or you have new or worsening symptoms follow-up. Patient verbalizes understanding and agreement with plan of care. Denies further needs or concerns.     Patient was given instructions and counseling regarding her condition and for health maintenance advised.       All questions were answered to her satisfaction.    BMI is within normal parameters. No other follow-up for BMI required.         ACO / ALEX/Other  Quality measures  -  Jie Aragon did not receive 2023 flu vaccine.  This was recommended.  -  Jie Aragon reports a pain score of 0.  Given her pain assessment as noted, treatment options were discussed and the following options were decided upon as a follow-up plan to address the patient's pain:  continuation of current treatment plan for pain.  -  Current outpatient and discharge medications have been reconciled for the patient.  Reviewed by: Ros Vasquez MD        Meds ordered during this visit  New Medications Ordered This Visit   Medications   • loratadine (CLARITIN) 10 MG tablet     Sig: Take 1 tablet by mouth weekly starting the day of growth factor injection.     Dispense:  30 tablet     Refill:  5   • sertraline (Zoloft) 50 MG tablet     Sig: Take 1 tablet by mouth Daily.     Dispense:  30 tablet     Refill:  3   • gabapentin (NEURONTIN) 100 MG capsule     Sig: Take 1 capsule by mouth At Night As Needed (neuropathy).     Dispense:  30 capsule     Refill:  3   • pyridoxine (VITAMIN B-6) 50 MG tablet tablet     Sig: Take 1 tablet by mouth Daily.     Dispense:  30 tablet     Refill:  3       Visit Diagnoses    ICD-10-CM ICD-9-CM   1. Malignant neoplasm of both ovaries  C56.3 183.0   2. Neuropathy  G62.9 355.9         Follow Up   Prior to C3 of carbo/taxol with NP, C4 with me in 6 wks        This document has been electronically signed by Ros Vasquez MD   July 22, 2024 09:17 EDT    Dictated Utilizing Dragon Dictation: Part of this note may be an electronic transcription/translation of spoken language to printed text using the Dragon Dictation System.

## 2024-07-22 NOTE — PROGRESS NOTES
Venipuncture Blood Specimen Collection  Venipuncture performed in left arm by Lianet Mcfarland MA with good hemostasis. Patient tolerated the procedure well without complications.   07/22/24   Lianet Mcfarland MA

## 2024-08-12 ENCOUNTER — INFUSION (OUTPATIENT)
Dept: ONCOLOGY | Facility: HOSPITAL | Age: 49
End: 2024-08-12
Payer: COMMERCIAL

## 2024-08-12 ENCOUNTER — OFFICE VISIT (OUTPATIENT)
Dept: ONCOLOGY | Facility: CLINIC | Age: 49
End: 2024-08-12
Payer: COMMERCIAL

## 2024-08-12 VITALS
WEIGHT: 148.6 LBS | DIASTOLIC BLOOD PRESSURE: 79 MMHG | RESPIRATION RATE: 18 BRPM | HEART RATE: 109 BPM | OXYGEN SATURATION: 99 % | SYSTOLIC BLOOD PRESSURE: 133 MMHG | TEMPERATURE: 97.3 F | BODY MASS INDEX: 21.94 KG/M2

## 2024-08-12 VITALS
RESPIRATION RATE: 18 BRPM | TEMPERATURE: 97.3 F | DIASTOLIC BLOOD PRESSURE: 79 MMHG | OXYGEN SATURATION: 99 % | WEIGHT: 148.6 LBS | BODY MASS INDEX: 21.94 KG/M2 | HEART RATE: 109 BPM | SYSTOLIC BLOOD PRESSURE: 133 MMHG

## 2024-08-12 DIAGNOSIS — C56.3 MALIGNANT NEOPLASM OF BOTH OVARIES: Primary | ICD-10-CM

## 2024-08-12 DIAGNOSIS — M79.10 MYALGIA: ICD-10-CM

## 2024-08-12 DIAGNOSIS — Z95.828 PORT-A-CATH IN PLACE: ICD-10-CM

## 2024-08-12 DIAGNOSIS — G62.9 NEUROPATHY: ICD-10-CM

## 2024-08-12 DIAGNOSIS — R11.2 NAUSEA AND VOMITING, UNSPECIFIED VOMITING TYPE: ICD-10-CM

## 2024-08-12 LAB
ALBUMIN SERPL-MCNC: 4.1 G/DL (ref 3.5–5.2)
ALBUMIN/GLOB SERPL: 1.4 G/DL
ALP SERPL-CCNC: 166 U/L (ref 39–117)
ALT SERPL W P-5'-P-CCNC: 37 U/L (ref 1–33)
ANION GAP SERPL CALCULATED.3IONS-SCNC: 12 MMOL/L (ref 5–15)
AST SERPL-CCNC: 23 U/L (ref 1–32)
BASOPHILS # BLD AUTO: 0.03 10*3/MM3 (ref 0–0.2)
BASOPHILS NFR BLD AUTO: 0.4 % (ref 0–1.5)
BILIRUB SERPL-MCNC: 0.2 MG/DL (ref 0–1.2)
BUN SERPL-MCNC: 10 MG/DL (ref 6–20)
BUN/CREAT SERPL: 15.2 (ref 7–25)
CALCIUM SPEC-SCNC: 9.2 MG/DL (ref 8.6–10.5)
CANCER AG125 SERPL QL: 9.7 U/ML (ref 0–38.1)
CHLORIDE SERPL-SCNC: 105 MMOL/L (ref 98–107)
CO2 SERPL-SCNC: 23 MMOL/L (ref 22–29)
CREAT SERPL-MCNC: 0.66 MG/DL (ref 0.57–1)
DEPRECATED RDW RBC AUTO: 55 FL (ref 37–54)
EGFRCR SERPLBLD CKD-EPI 2021: 107 ML/MIN/1.73
EOSINOPHIL # BLD AUTO: 0.03 10*3/MM3 (ref 0–0.4)
EOSINOPHIL NFR BLD AUTO: 0.4 % (ref 0.3–6.2)
ERYTHROCYTE [DISTWIDTH] IN BLOOD BY AUTOMATED COUNT: 16.5 % (ref 12.3–15.4)
GLOBULIN UR ELPH-MCNC: 2.9 GM/DL
GLUCOSE SERPL-MCNC: 134 MG/DL (ref 65–99)
HCT VFR BLD AUTO: 34 % (ref 34–46.6)
HGB BLD-MCNC: 11.4 G/DL (ref 12–15.9)
IMM GRANULOCYTES # BLD AUTO: 0.03 10*3/MM3 (ref 0–0.05)
IMM GRANULOCYTES NFR BLD AUTO: 0.4 % (ref 0–0.5)
LYMPHOCYTES # BLD AUTO: 1.21 10*3/MM3 (ref 0.7–3.1)
LYMPHOCYTES NFR BLD AUTO: 16.6 % (ref 19.6–45.3)
MCH RBC QN AUTO: 30.6 PG (ref 26.6–33)
MCHC RBC AUTO-ENTMCNC: 33.5 G/DL (ref 31.5–35.7)
MCV RBC AUTO: 91.4 FL (ref 79–97)
MONOCYTES # BLD AUTO: 0.56 10*3/MM3 (ref 0.1–0.9)
MONOCYTES NFR BLD AUTO: 7.7 % (ref 5–12)
NEUTROPHILS NFR BLD AUTO: 5.44 10*3/MM3 (ref 1.7–7)
NEUTROPHILS NFR BLD AUTO: 74.5 % (ref 42.7–76)
NRBC BLD AUTO-RTO: 0 /100 WBC (ref 0–0.2)
PLATELET # BLD AUTO: 304 10*3/MM3 (ref 140–450)
PMV BLD AUTO: 9.4 FL (ref 6–12)
POTASSIUM SERPL-SCNC: 3.8 MMOL/L (ref 3.5–5.2)
PROT SERPL-MCNC: 7 G/DL (ref 6–8.5)
RBC # BLD AUTO: 3.72 10*6/MM3 (ref 3.77–5.28)
SODIUM SERPL-SCNC: 140 MMOL/L (ref 136–145)
WBC NRBC COR # BLD AUTO: 7.3 10*3/MM3 (ref 3.4–10.8)

## 2024-08-12 PROCEDURE — 25010000002 HEPARIN LOCK FLUSH PER 10 UNITS

## 2024-08-12 PROCEDURE — 1126F AMNT PAIN NOTED NONE PRSNT: CPT | Performed by: NURSE PRACTITIONER

## 2024-08-12 PROCEDURE — 96413 CHEMO IV INFUSION 1 HR: CPT

## 2024-08-12 PROCEDURE — 96377 APPLICATON ON-BODY INJECTOR: CPT

## 2024-08-12 PROCEDURE — 25010000002 DIPHENHYDRAMINE PER 50 MG: Performed by: NURSE PRACTITIONER

## 2024-08-12 PROCEDURE — 25010000002 PEGFILGRASTIM 6 MG/0.6ML PREFILLED SYRINGE KIT: Performed by: NURSE PRACTITIONER

## 2024-08-12 PROCEDURE — 99214 OFFICE O/P EST MOD 30 MIN: CPT | Performed by: NURSE PRACTITIONER

## 2024-08-12 PROCEDURE — 25810000003 SODIUM CHLORIDE 0.9 % SOLUTION: Performed by: NURSE PRACTITIONER

## 2024-08-12 PROCEDURE — 96415 CHEMO IV INFUSION ADDL HR: CPT

## 2024-08-12 PROCEDURE — 25010000002 FOSAPREPITANT PER 1 MG: Performed by: NURSE PRACTITIONER

## 2024-08-12 PROCEDURE — 25010000002 PALONOSETRON 0.25 MG/5ML SOLUTION PREFILLED SYRINGE: Performed by: NURSE PRACTITIONER

## 2024-08-12 PROCEDURE — 96417 CHEMO IV INFUS EACH ADDL SEQ: CPT

## 2024-08-12 PROCEDURE — 25010000002 PACLITAXEL PER 1 MG: Performed by: NURSE PRACTITIONER

## 2024-08-12 PROCEDURE — 85025 COMPLETE CBC W/AUTO DIFF WBC: CPT

## 2024-08-12 PROCEDURE — 96375 TX/PRO/DX INJ NEW DRUG ADDON: CPT

## 2024-08-12 PROCEDURE — 25810000003 SODIUM CHLORIDE 0.9 % SOLUTION 500 ML FLEX CONT: Performed by: NURSE PRACTITIONER

## 2024-08-12 PROCEDURE — 25010000002 DEXAMETHASONE SODIUM PHOSPHATE 20 MG/5ML SOLUTION: Performed by: NURSE PRACTITIONER

## 2024-08-12 PROCEDURE — 96367 TX/PROPH/DG ADDL SEQ IV INF: CPT

## 2024-08-12 PROCEDURE — 25810000003 SODIUM CHLORIDE 0.9 % SOLUTION 250 ML FLEX CONT: Performed by: NURSE PRACTITIONER

## 2024-08-12 PROCEDURE — 86304 IMMUNOASSAY TUMOR CA 125: CPT

## 2024-08-12 PROCEDURE — 80053 COMPREHEN METABOLIC PANEL: CPT

## 2024-08-12 PROCEDURE — 25010000002 CARBOPLATIN PER 50 MG: Performed by: NURSE PRACTITIONER

## 2024-08-12 RX ORDER — IBUPROFEN 800 MG/1
800 TABLET ORAL EVERY 8 HOURS PRN
Qty: 30 TABLET | Refills: 1 | Status: SHIPPED | OUTPATIENT
Start: 2024-08-12

## 2024-08-12 RX ORDER — SODIUM CHLORIDE 0.9 % (FLUSH) 0.9 %
10 SYRINGE (ML) INJECTION AS NEEDED
OUTPATIENT
Start: 2024-08-12

## 2024-08-12 RX ORDER — HEPARIN SODIUM (PORCINE) LOCK FLUSH IV SOLN 100 UNIT/ML 100 UNIT/ML
500 SOLUTION INTRAVENOUS AS NEEDED
Status: DISCONTINUED | OUTPATIENT
Start: 2024-08-12 | End: 2024-08-12 | Stop reason: HOSPADM

## 2024-08-12 RX ORDER — SODIUM CHLORIDE 0.9 % (FLUSH) 0.9 %
10 SYRINGE (ML) INJECTION AS NEEDED
Status: DISCONTINUED | OUTPATIENT
Start: 2024-08-12 | End: 2024-08-12 | Stop reason: HOSPADM

## 2024-08-12 RX ORDER — SODIUM CHLORIDE 9 MG/ML
20 INJECTION, SOLUTION INTRAVENOUS ONCE
Status: COMPLETED | OUTPATIENT
Start: 2024-08-12 | End: 2024-08-12

## 2024-08-12 RX ORDER — LORATADINE 10 MG/1
TABLET ORAL
Qty: 30 TABLET | Refills: 5 | Status: SHIPPED | OUTPATIENT
Start: 2024-08-12

## 2024-08-12 RX ORDER — FAMOTIDINE 10 MG/ML
20 INJECTION, SOLUTION INTRAVENOUS ONCE
Status: CANCELLED | OUTPATIENT
Start: 2024-08-12

## 2024-08-12 RX ORDER — SODIUM CHLORIDE 9 MG/ML
20 INJECTION, SOLUTION INTRAVENOUS ONCE
Status: CANCELLED | OUTPATIENT
Start: 2024-08-12

## 2024-08-12 RX ORDER — PALONOSETRON 0.05 MG/ML
0.25 INJECTION, SOLUTION INTRAVENOUS ONCE
Status: COMPLETED | OUTPATIENT
Start: 2024-08-12 | End: 2024-08-12

## 2024-08-12 RX ORDER — HEPARIN SODIUM (PORCINE) LOCK FLUSH IV SOLN 100 UNIT/ML 100 UNIT/ML
500 SOLUTION INTRAVENOUS AS NEEDED
OUTPATIENT
Start: 2024-08-12

## 2024-08-12 RX ORDER — FAMOTIDINE 10 MG/ML
20 INJECTION, SOLUTION INTRAVENOUS ONCE
Status: COMPLETED | OUTPATIENT
Start: 2024-08-12 | End: 2024-08-12

## 2024-08-12 RX ORDER — FAMOTIDINE 10 MG/ML
20 INJECTION, SOLUTION INTRAVENOUS AS NEEDED
Status: CANCELLED | OUTPATIENT
Start: 2024-08-12

## 2024-08-12 RX ORDER — DIPHENHYDRAMINE HYDROCHLORIDE 50 MG/ML
50 INJECTION INTRAMUSCULAR; INTRAVENOUS AS NEEDED
Status: CANCELLED | OUTPATIENT
Start: 2024-08-12

## 2024-08-12 RX ORDER — PALONOSETRON 0.05 MG/ML
0.25 INJECTION, SOLUTION INTRAVENOUS ONCE
Status: CANCELLED | OUTPATIENT
Start: 2024-08-12

## 2024-08-12 RX ORDER — LIDOCAINE AND PRILOCAINE 25; 25 MG/G; MG/G
CREAM TOPICAL
Qty: 30 G | Refills: 1 | Status: SHIPPED | OUTPATIENT
Start: 2024-08-12

## 2024-08-12 RX ADMIN — CARBOPLATIN 760 MG: 10 INJECTION, SOLUTION INTRAVENOUS at 14:19

## 2024-08-12 RX ADMIN — FAMOTIDINE 20 MG: 10 INJECTION INTRAVENOUS at 09:30

## 2024-08-12 RX ADMIN — DEXAMETHASONE SODIUM PHOSPHATE 20 MG: 4 INJECTION, SOLUTION INTRA-ARTICULAR; INTRALESIONAL; INTRAMUSCULAR; INTRAVENOUS; SOFT TISSUE at 09:49

## 2024-08-12 RX ADMIN — PEGFILGRASTIM 6 MG: KIT SUBCUTANEOUS at 15:05

## 2024-08-12 RX ADMIN — FOSAPREPITANT 150 MG: 150 INJECTION, POWDER, LYOPHILIZED, FOR SOLUTION INTRAVENOUS at 10:06

## 2024-08-12 RX ADMIN — DIPHENHYDRAMINE HYDROCHLORIDE 50 MG: 50 INJECTION, SOLUTION INTRAMUSCULAR; INTRAVENOUS at 09:33

## 2024-08-12 RX ADMIN — SODIUM CHLORIDE 20 ML/HR: 9 INJECTION, SOLUTION INTRAVENOUS at 09:33

## 2024-08-12 RX ADMIN — HEPARIN 500 UNITS: 100 SYRINGE at 15:00

## 2024-08-12 RX ADMIN — PALONOSETRON 0.25 MG: 0.25 INJECTION, SOLUTION INTRAVENOUS at 09:28

## 2024-08-12 RX ADMIN — Medication 10 ML: at 15:00

## 2024-08-12 RX ADMIN — PACLITAXEL 315 MG: 6 INJECTION, SOLUTION INTRAVENOUS at 10:49

## 2024-08-12 NOTE — PROGRESS NOTES
Subjective     Date: 8/12/2024    Referring Provider  No ref. provider found    Chief Complaint  Malignant neoplasm of both ovaries      Cancer Staging   FIGO Stage II (cT2, cN0, cM0)  Stage IC (pT1c2, pN0, cM0)      Subjective          Jie Aragon is a 49 y.o. female who presents today to [facility] for follow up.     HPI:   49 y.o. 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     Oncology history:  April 2024: Palpated right lower abdominal mass while applying lotion in tanning salon. This was associated with abdominal pain, bloating, and nausea. Care was delayed because patient did not have insurance at the time. Eventually went to PCP who ordered CT imaging of the abdomen.  May 8 2024: CT imaging R mass 17 x 13 cm and left 11x5 cm with septations, likely ovarian.  65  May 20 2024: Seen by Dr. Dubois for consultation.   May 23 2024: Underwent exploratory laparotomy, total abdominal hysterectomy, bilateral salpingo oophorectomy, omentectomy, appendectomy. Final pathology with left ovary with high grade serous carcinoma 9.5 cm confined to the ovary with no surface involvement identified, Right ovary with involved by high grade serous carcinoma, confined to the ovary. Fallopian tube with no change. Lymph nodes and omentum negative for malignancy. Uterus with high grade squamous intraepithelial lesion ALEX-3.   Final pathology pT1c and pN0 (ovarian ruptured at time of surgery).     She presents today with her daughter in law, Donna. Ms. Aragon is healing well without any post op complications. She is a smoker, smokes 1 pack/day X 30+ years, denies alcohol or drug use. Has family history of paternal grandfather with brain tumor, half brother with leukemia, and son with testicular cancer. She has three children 2 daughters and 1 son ranging from 26-31.      Treatment History:          Interval History 07/22/2024   Ms. Aragon presents today prior to C2 of adjuvant carbo/taxol.   [facility] genetic testing is negative for BRCA1/BRCA2.   She reports no NVDC with C1, did have significant myalgia with filgrastim injection which subsided after discontinuation.   Lost all hair after C1. Was emotional, currently on sertaline 25 mg QD with good tolerance. Denies constant neuropathy, admits to having intermittent numbness of her fingers.    Interval History 08/12/2024  Ms. Aragon presents today prior to cycle 3 Carboplatin/Taxol. She reports following cycle 2 she struggled with nausea and vomiting for about three days despite taking zofran and compazine. At present, this has resolved and her appetite has improved. She reports that she is hydrating well. She has intermittent constipation which is relieved with stool softeners and Miralax as needed. Neuropathy is stable. She also continues to report significant bony pain with the use of Claritin daily. She is without any other complaints or concerns at this time.         Objective     Objective     Allergy:   Allergies   Allergen Reactions    Amoxicillin Other (See Comments)     unknown    Penicillins Other (See Comments)     unknown        Current Medications:   Current Outpatient Medications   Medication Sig Dispense Refill    lidocaine-prilocaine (EMLA) 2.5-2.5 % cream Apply to port-a-cath site 30 minutes prior to arrival at infusion center. Cover with plastic wrap. 30 g 1    loratadine (CLARITIN) 10 MG tablet Take 1 tablet by mouth weekly starting the day of growth factor injection. 30 tablet 5    acetaminophen (TYLENOL) 325 MG tablet Take 2 tablets by mouth Every 6 Hours. 30 tablet 1    docusate sodium (COLACE) 100 MG capsule Take 2 capsules by mouth 2 (Two) Times a Day As Needed for Constipation. Two capusles 60 capsule 3    gabapentin (NEURONTIN) 100 MG capsule Take 1 capsule by mouth At Night As Needed (neuropathy). 30  capsule 3    granisetron (SANCUSO) 3.1 MG/24HR Place 1 patch on the skin as directed by provider 1 (One) Time Per Week. 4 patch 0    ibuprofen (ADVIL,MOTRIN) 800 MG tablet Take 1 tablet by mouth Every 8 (Eight) Hours As Needed for Mild Pain. 30 tablet 1    naloxone (NARCAN) 4 MG/0.1ML nasal spray Call 911. Don't prime. [place] in 1 nostril for overdose. Repeat in 2-3 minutes in other nostril if no or minimal breathing/responsiveness. 2 each 0    nicotine (Nicotine Step 1) 21 MG/24HR patch Place 1 patch on the skin as directed by provider Daily. 30 patch 0    nicotine (Nicotine Step 2) 14 MG/24HR patch Place 1 patch on the skin as directed by provider Daily. 30 patch 0    nicotine (Nicotine Step 3) 7 MG/24HR patch Place 1 patch on the skin as directed by provider Daily. 30 patch 0    OLANZapine (ZyPREXA) 5 MG tablet Take 1 tablet by mouth Every Night. Take nightly x 4 starting night of chemotherapy. 4 tablet 5    ondansetron (ZOFRAN) 8 MG tablet Take 1 tablet by mouth 3 (Three) Times a Day As Needed for Nausea or Vomiting. 30 tablet 5    oxyCODONE (OXY-IR) 5 MG capsule Take 1 capsule by mouth As Needed for Moderate Pain.      polyethylene glycol (MIRALAX) 17 GM/SCOOP powder Take 17 g by mouth Daily. 510 g 0    prochlorperazine (COMPAZINE) 10 MG tablet Take 1 tablet by mouth Every 6 (Six) Hours As Needed for Nausea or Vomiting. 30 tablet 1    pyridoxine (VITAMIN B-6) 50 MG tablet tablet Take 1 tablet by mouth Daily. 30 tablet 3    rivaroxaban (Xarelto) 10 MG tablet Take 1 tablet by mouth Daily. 30 tablet 0    sertraline (Zoloft) 50 MG tablet Take 1 tablet by mouth Daily. 30 tablet 3     No current facility-administered medications for this visit.     Facility-Administered Medications Ordered in Other Visits   Medication Dose Route Frequency Provider Last Rate Last Admin    CARBOplatin (PARAPLATIN) 760 mg in sodium chloride 0.9 % 326 mL chemo IVPB  760 mg Intravenous Once Sera Marsh, APRN        heparin  injection 500 Units  500 Units Intravenous PRN Sujata Maryn, APRN        PACLitaxel (TAXOL) 315 mg in sodium chloride 0.9 % 552.5 mL chemo IVPB  175 mg/m2 (Treatment Plan Recorded) Intravenous Once Sera Marsh APRN   315 mg at 08/12/24 1049    pegfilgrastim (NEULASTA ONPRO) on-body injector 6 mg  6 mg Subcutaneous Once Sera Marsh Blanca, APRN        sodium chloride 0.9 % flush 10 mL  10 mL Intravenous PRN Sujata Maryn, APRN           Past Medical History:  Past Medical History:   Diagnosis Date    Abdominal pain     Change in vision     Chills     Cough     Difficulty breathing     Fatigue     Multiple gestation     Nausea     Night sweats     Ovarian cancer 5/24/2024    Poor appetite     Urinary tract infection     Wears dentures     UPPER AND LOWER    Weight gain        Past Surgical History:  Past Surgical History:   Procedure Laterality Date    APPENDECTOMY N/A 5/23/2024    Procedure: APPENDECTOMY;  Surgeon: Shanda Dubois MD;  Location:  [facility];  Service: Gynecology Oncology;  Laterality: N/A;    CYSTOSCOPY Bilateral 5/23/2024    Procedure: CYSTOSCOPY TEMPORARY PLACEMENT BILATERAL URETERAL CATHETER;  Surgeon: Shanda Dubois MD;  Location:  [facility];  Service: Gynecology Oncology;  Laterality: Bilateral;    EXPLORATORY LAPAROTOMY, TOTAL ABDOMINAL HYSTERECTOMY SALPINGO OOPHORECTOMY N/A 5/23/2024    Procedure: EXPLORATORY LAPAROTOMY, TOTAL ABDOMINAL HYSTERECTOMY BILATERAL SALPINGO OOPHORECTOMY, CANCER STAGING/ OPTIMAL DEBULKLING (R=0);  Surgeon: Shanda Dubois MD;  Location:  [facility];  Service: Gynecology Oncology;  Laterality: N/A;    OMENTECTOMY N/A 5/23/2024    Procedure: OMENTECTOMY;  Surgeon: Shanda Dubois MD;  Location:  [facility];  Service: Gynecology Oncology;  Laterality: N/A;    PORTACATH PLACEMENT Right 6/24/2024    Procedure: INSERTION OF PORTACATH;  Surgeon: Yakov Chung MD;  Location:  [facility];  Service: General;  Laterality: Right;    TUBAL  ABDOMINAL LIGATION         Social History:  Social History     Socioeconomic History    Marital status: Single   Tobacco Use    Smoking status: Every Day     Current packs/day: 1.00     Average packs/day: 1 pack/day for 15.0 years (15.0 ttl pk-yrs)     Types: Cigarettes     Passive exposure: Current    Smokeless tobacco: Never   Vaping Use    Vaping status: Never Used   Substance and Sexual Activity    Alcohol use: Never    Drug use: Never    Sexual activity: Yes     Partners: Male     Birth control/protection: I.U.D., Tubal ligation     Jie Aragon  reports that she has been smoking cigarettes. She has a 15 pack-year smoking history. She has been exposed to tobacco smoke. She has never used smokeless tobacco. I have educated her on the risk of diseases from using tobacco products such as cancer, COPD, and heart disease.     I advised her to quit and she is not willing to quit.           Family History:  Family History   Problem Relation Age of Onset    Stroke Father     Hypertension Father     Heart attack Father     Deep vein thrombosis Mother     Leukemia Brother     Testicular cancer Son        Review of Systems:  Review of Systems   Gastrointestinal:  Positive for constipation, nausea and vomiting.   Musculoskeletal:  Positive for myalgias.   All other systems reviewed and are negative.      Vital Signs:   /79   Pulse 109   Temp 97.3 °F (36.3 °C) (Temporal)   Resp 18   Wt 67.4 kg (148 lb 9.6 oz)   SpO2 99%   BMI 21.94 kg/m²      Physical Exam:  Physical Exam  Vitals reviewed.   Constitutional:       General: She is not in acute distress.     Appearance: Normal appearance. She is not ill-appearing.   HENT:      Head: Normocephalic and atraumatic.      Mouth/Throat:      Mouth: Mucous membranes are moist.      Pharynx: Oropharynx is clear.   Eyes:      Conjunctiva/sclera: Conjunctivae normal.      Pupils: Pupils are equal, round, and reactive to light.   Cardiovascular:      Rate and  Rhythm: Normal rate and regular rhythm.      Heart sounds: No murmur heard.  Pulmonary:      Effort: Pulmonary effort is normal. No respiratory distress.      Breath sounds: Normal breath sounds. No wheezing.   Chest:      Comments: +R chest port  Abdominal:      General: Abdomen is flat. Bowel sounds are normal. There is no distension.      Palpations: Abdomen is soft. There is no mass.      Tenderness: There is no abdominal tenderness. There is no guarding.   Musculoskeletal:         General: No swelling. Normal range of motion.      Cervical back: Normal range of motion.   Lymphadenopathy:      Cervical: No cervical adenopathy.   Skin:     General: Skin is warm and dry.   Neurological:      General: No focal deficit present.      Mental Status: She is alert and oriented to person, place, and time. Mental status is at baseline.   Psychiatric:         Mood and Affect: Mood normal.         PHQ-9 Score  PHQ-9 Total Score:       Pain Score  Vitals:    08/12/24 0825   BP: 133/79   Pulse: 109   Resp: 18   Temp: 97.3 °F (36.3 °C)   TempSrc: Temporal   SpO2: 99%   Weight: 67.4 kg (148 lb 9.6 oz)   PainSc: 0-No pain             PAINSCOREQUALITYMETRIC:   Vitals:    08/12/24 0825   PainSc: 0-No pain           Lab Review  Lab Results   Component Value Date    WBC 7.30 08/12/2024    HGB 11.4 (L) 08/12/2024    HCT 34.0 08/12/2024    MCV 91.4 08/12/2024    RDW 16.5 (H) 08/12/2024     08/12/2024    NEUTRORELPCT 74.5 08/12/2024    LYMPHORELPCT 16.6 (L) 08/12/2024    MONORELPCT 7.7 08/12/2024    EOSRELPCT 0.4 08/12/2024    BASORELPCT 0.4 08/12/2024    NEUTROABS 5.44 08/12/2024    LYMPHSABS 1.21 08/12/2024     Lab Results   Component Value Date     08/12/2024    K 3.8 08/12/2024    CO2 23.0 08/12/2024     08/12/2024    BUN 10 08/12/2024    CREATININE 0.66 08/12/2024    GLUCOSE 134 (H) 08/12/2024    CALCIUM 9.2 08/12/2024    ALKPHOS 166 (H) 08/12/2024    AST 23 08/12/2024    ALT 37 (H) 08/12/2024    BILITOT 0.2  08/12/2024    ALBUMIN 4.1 08/12/2024    PROTEINTOT 7.0 08/12/2024    MG 1.8 05/26/2024         Radiology Results  CT Angiogram Chest Pulmonary Embolism (05/26/2024 07:45)   FINDINGS:  Contrast bolus timing is satisfactory to evaluate for pulmonary  embolism.  Pulmonary arteries: normal.  No pulmonary arterial filling defect is  seen.  Heart and pericardium: normal.  Aorta: normal.  Esophagus: normal.  Lungs and airways: Linear bibasilar atelectasis.  Peripheral emphysema  and cysts..  Pleura: normal.  Lymph nodes: normal.  Musculoskeletal and chest wall: Free air in the abdomen is postsurgical  most likely  Other: n/a     IMPRESSION:  NEGATIVE FOR PULMONARY EMBOLISM.  LINEAR BIBASILAR ATELECTASIS.  FREE AIR IN THE ABDOMEN IS LIKELY POSTSURGICAL    No acute cardiopulmonary process.    Scanned Imaging   CT Abdomen/Pelvis with Contrast (05/08/2024)           Pathology:   05/23/2024              Genetic Testing          Assessment / Plan         Assessment and Plan   Jie Aragon is a 49 y.o. 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 PD-L1 <1%   Cancer Staging   FIGO Stage II (cT2, cN0, cM0)  Stage IC (pT1c2, pN0, cM0)  -patient presents with palpation of RLQ abdominal mass, associated with abdominal pain, bloating, and nausea for ~1 months prior to CT A/P. CT A/P with R mass 17 cm and L mass 11 cm c/f ovarian cancer. : 65  -5/2024 Underwent exploratory laparotomy, total abdominal hysterectomy, bilateral salpingo oophorectomy, omentectomy, appendectomy by Dr. Dubois at [facility]. Final pathology with left ovary with high grade serous carcinoma 9.5 cm confined to the ovary with no surface involvement identified, Right ovary with involved by high grade serous carcinoma, confined to the ovary. Fallopian tube with no change. Lymph nodes and omentum negative for malignancy. Uterus with high grade squamous intraepithelial lesion ALEX-3.   Final pathology pT1c and pN0 (ovarian ruptured at  time of surgery).   -We reviewed the NCCN guidelines which indicate adjuvant chemotherapy with carboplatin AUC 5-6 D1 and paclitaxel 175 mg/m2 D1 every 21 days for 6 cycles.  -C2 7/22- tolerated well despite toxicities including nausea/vomiting and myalgias likely secondary to growth factor (will address below)  -C3 08/12-proceed today    2. Malignancy associated pain  -Currently denies     3. Nutrition   - Recommend high calories/protein diet during the treatment     4. Chemotherapy induced neuropathy   - Admits to intermittent neuropathy of b/l fingers  - Started on B6 and QHS gabapentin 100 mg  - Neuropathy remains stable. Will continue.    5. Depression   - Started patient on sertraline 25 mg QD and this was increased to 50 mg QD at her last visit. She reports good tolerance with improvement in symptoms.    6. Nausea/Vomiting  - Reports nausea/vomiting x 3 day following cycle 2 despite zofran and compazine. Recommended to discontinue zofran and replace with Sancuso patch. Advised to continue compazine as needed.   - Discussed the need for supportive care with patient and she knows to call if she has needs/concerns. Will monitor.    7. Myalgias  - Recommended Ibuprofen one hour prior to [place] On-Body injection and then to take as needed. Advised to avoid taking on an empty stomach and not to take for greater than 48 hours. Also recommended Claritin BID x 7 days. Will monitor.      Discussed possible differential diagnoses, testing, treatment, recommended non-pharmacological interventions, risks, warning signs to monitor for that would indicate need for follow-up in clinic or ER. If no improvement with these regimens or you have new or worsening symptoms follow-up. Patient verbalizes understanding and agreement with plan of care. Denies further needs or concerns.     Patient was given instructions and counseling regarding her condition and for health maintenance advised.       All questions were answered to her  satisfaction.    BMI is within normal parameters. No other follow-up for BMI required.      ACO / ALEX/Other  Quality measures  -  Jie Aragon did not receive 2023 flu vaccine.  This was recommended.  -  Jie Aragon reports a pain score of 0.    -  Current outpatient and discharge medications have been reconciled for the patient.  Reviewed by: ESTHER Pickens                         Meds ordered during this visit  New Medications Ordered This Visit   Medications    lidocaine-prilocaine (EMLA) 2.5-2.5 % cream     Sig: Apply to port-a-cath site 30 minutes prior to arrival at infusion center. Cover with plastic wrap.     Dispense:  30 g     Refill:  1    loratadine (CLARITIN) 10 MG tablet     Sig: Take 1 tablet by mouth weekly starting the day of growth factor injection.     Dispense:  30 tablet     Refill:  5    ibuprofen (ADVIL,MOTRIN) 800 MG tablet     Sig: Take 1 tablet by mouth Every 8 (Eight) Hours As Needed for Mild Pain.     Dispense:  30 tablet     Refill:  1       Visit Diagnoses    ICD-10-CM ICD-9-CM   1. Malignant neoplasm of both ovaries  C56.3 183.0   2. Neuropathy  G62.9 355.9   3. Nausea and vomiting, unspecified vomiting type  R11.2 787.01   4. Myalgia  M79.10 729.1           Follow Up   With Dr. Vasquez as scheduled on day of cycle 4.        This document has been electronically signed by ESTHER Nickerson   August 12, 2024 11:07 EDT

## 2024-08-25 ENCOUNTER — APPOINTMENT (OUTPATIENT)
Dept: GENERAL RADIOLOGY | Facility: HOSPITAL | Age: 49
DRG: 871 | End: 2024-08-25
Payer: COMMERCIAL

## 2024-08-25 ENCOUNTER — APPOINTMENT (OUTPATIENT)
Dept: CT IMAGING | Facility: HOSPITAL | Age: 49
DRG: 871 | End: 2024-08-25
Payer: COMMERCIAL

## 2024-08-25 ENCOUNTER — HOSPITAL ENCOUNTER (INPATIENT)
Facility: HOSPITAL | Age: 49
LOS: 2 days | Discharge: HOME | DRG: 871 | End: 2024-08-27
Attending: STUDENT IN AN ORGANIZED HEALTH CARE EDUCATION/TRAINING PROGRAM | Admitting: INTERNAL MEDICINE
Payer: COMMERCIAL

## 2024-08-25 DIAGNOSIS — U07.1 COVID-19: Primary | ICD-10-CM

## 2024-08-25 LAB
ALBUMIN SERPL-MCNC: 4.3 G/DL (ref 3.5–5.2)
ALBUMIN/GLOB SERPL: 1.4 G/DL
ALP SERPL-CCNC: 205 U/L (ref 39–117)
ALT SERPL W P-5'-P-CCNC: 54 U/L (ref 1–33)
ANION GAP SERPL CALCULATED.3IONS-SCNC: 13.7 MMOL/L (ref 5–15)
AST SERPL-CCNC: 29 U/L (ref 1–32)
BASOPHILS # BLD AUTO: 0.06 10*3/MM3 (ref 0–0.2)
BASOPHILS NFR BLD AUTO: 0.3 % (ref 0–1.5)
BILIRUB SERPL-MCNC: <0.2 MG/DL (ref 0–1.2)
BUN SERPL-MCNC: 7 MG/DL (ref 6–20)
BUN/CREAT SERPL: 10.1 (ref 7–25)
CALCIUM SPEC-SCNC: 9.6 MG/DL (ref 8.6–10.5)
CHLORIDE SERPL-SCNC: 100 MMOL/L (ref 98–107)
CK SERPL-CCNC: 35 U/L (ref 20–180)
CO2 SERPL-SCNC: 21.3 MMOL/L (ref 22–29)
CREAT SERPL-MCNC: 0.69 MG/DL (ref 0.57–1)
CRP SERPL-MCNC: <0.3 MG/DL (ref 0–0.5)
D-LACTATE SERPL-SCNC: 1.2 MMOL/L (ref 0.5–2)
DEPRECATED RDW RBC AUTO: 55.9 FL (ref 37–54)
EGFRCR SERPLBLD CKD-EPI 2021: 105.9 ML/MIN/1.73
EOSINOPHIL # BLD AUTO: 0.03 10*3/MM3 (ref 0–0.4)
EOSINOPHIL NFR BLD AUTO: 0.1 % (ref 0.3–6.2)
ERYTHROCYTE [DISTWIDTH] IN BLOOD BY AUTOMATED COUNT: 17.3 % (ref 12.3–15.4)
FERRITIN SERPL-MCNC: 335.6 NG/ML (ref 13–150)
FLUAV RNA RESP QL NAA+PROBE: NOT DETECTED
FLUBV RNA ISLT QL NAA+PROBE: NOT DETECTED
GEN 5 2HR TROPONIN T REFLEX: <6 NG/L
GLOBULIN UR ELPH-MCNC: 3 GM/DL
GLUCOSE SERPL-MCNC: 89 MG/DL (ref 65–99)
HBA1C MFR BLD: 5.9 % (ref 4.8–5.6)
HCT VFR BLD AUTO: 33.2 % (ref 34–46.6)
HGB BLD-MCNC: 11.4 G/DL (ref 12–15.9)
HOLD SPECIMEN: NORMAL
HOLD SPECIMEN: NORMAL
IMM GRANULOCYTES # BLD AUTO: 0.4 10*3/MM3 (ref 0–0.05)
IMM GRANULOCYTES NFR BLD AUTO: 2 % (ref 0–0.5)
LDH SERPL-CCNC: 177 U/L (ref 135–214)
LYMPHOCYTES # BLD AUTO: 0.56 10*3/MM3 (ref 0.7–3.1)
LYMPHOCYTES NFR BLD AUTO: 2.7 % (ref 19.6–45.3)
MAGNESIUM SERPL-MCNC: 1.6 MG/DL (ref 1.6–2.6)
MCH RBC QN AUTO: 31.1 PG (ref 26.6–33)
MCHC RBC AUTO-ENTMCNC: 34.3 G/DL (ref 31.5–35.7)
MCV RBC AUTO: 90.5 FL (ref 79–97)
MONOCYTES # BLD AUTO: 1.48 10*3/MM3 (ref 0.1–0.9)
MONOCYTES NFR BLD AUTO: 7.2 % (ref 5–12)
NEUTROPHILS NFR BLD AUTO: 17.92 10*3/MM3 (ref 1.7–7)
NEUTROPHILS NFR BLD AUTO: 87.7 % (ref 42.7–76)
NRBC BLD AUTO-RTO: 0 /100 WBC (ref 0–0.2)
PLATELET # BLD AUTO: 199 10*3/MM3 (ref 140–450)
PMV BLD AUTO: 9.4 FL (ref 6–12)
POTASSIUM SERPL-SCNC: 3.8 MMOL/L (ref 3.5–5.2)
PROT SERPL-MCNC: 7.3 G/DL (ref 6–8.5)
RBC # BLD AUTO: 3.67 10*6/MM3 (ref 3.77–5.28)
SARS-COV-2 RNA RESP QL NAA+PROBE: DETECTED
SODIUM SERPL-SCNC: 135 MMOL/L (ref 136–145)
TROPONIN T DELTA: NORMAL
TROPONIN T SERPL HS-MCNC: <6 NG/L
WBC NRBC COR # BLD AUTO: 20.45 10*3/MM3 (ref 3.4–10.8)
WHOLE BLOOD HOLD COAG: NORMAL
WHOLE BLOOD HOLD SPECIMEN: NORMAL

## 2024-08-25 PROCEDURE — 99285 EMERGENCY DEPT VISIT HI MDM: CPT

## 2024-08-25 PROCEDURE — 83036 HEMOGLOBIN GLYCOSYLATED A1C: CPT | Performed by: INTERNAL MEDICINE

## 2024-08-25 PROCEDURE — 82550 ASSAY OF CK (CPK): CPT | Performed by: INTERNAL MEDICINE

## 2024-08-25 PROCEDURE — 83605 ASSAY OF LACTIC ACID: CPT | Performed by: PHYSICIAN ASSISTANT

## 2024-08-25 PROCEDURE — 25010000002 METHYLPREDNISOLONE PER 125 MG: Performed by: PHYSICIAN ASSISTANT

## 2024-08-25 PROCEDURE — 99223 1ST HOSP IP/OBS HIGH 75: CPT | Performed by: INTERNAL MEDICINE

## 2024-08-25 PROCEDURE — 84484 ASSAY OF TROPONIN QUANT: CPT | Performed by: STUDENT IN AN ORGANIZED HEALTH CARE EDUCATION/TRAINING PROGRAM

## 2024-08-25 PROCEDURE — 25810000003 SEPSIS FLUID NS 0.9 % SOLUTION: Performed by: PHYSICIAN ASSISTANT

## 2024-08-25 PROCEDURE — 94640 AIRWAY INHALATION TREATMENT: CPT

## 2024-08-25 PROCEDURE — 83615 LACTATE (LD) (LDH) ENZYME: CPT | Performed by: INTERNAL MEDICINE

## 2024-08-25 PROCEDURE — 25510000001 IOPAMIDOL PER 1 ML: Performed by: STUDENT IN AN ORGANIZED HEALTH CARE EDUCATION/TRAINING PROGRAM

## 2024-08-25 PROCEDURE — 82728 ASSAY OF FERRITIN: CPT | Performed by: INTERNAL MEDICINE

## 2024-08-25 PROCEDURE — 71275 CT ANGIOGRAPHY CHEST: CPT

## 2024-08-25 PROCEDURE — 83735 ASSAY OF MAGNESIUM: CPT | Performed by: INTERNAL MEDICINE

## 2024-08-25 PROCEDURE — 71045 X-RAY EXAM CHEST 1 VIEW: CPT

## 2024-08-25 PROCEDURE — 94799 UNLISTED PULMONARY SVC/PX: CPT

## 2024-08-25 PROCEDURE — 25010000002 HEPARIN (PORCINE) PER 1000 UNITS: Performed by: INTERNAL MEDICINE

## 2024-08-25 PROCEDURE — 94761 N-INVAS EAR/PLS OXIMETRY MLT: CPT

## 2024-08-25 PROCEDURE — 85025 COMPLETE CBC W/AUTO DIFF WBC: CPT | Performed by: STUDENT IN AN ORGANIZED HEALTH CARE EDUCATION/TRAINING PROGRAM

## 2024-08-25 PROCEDURE — 36415 COLL VENOUS BLD VENIPUNCTURE: CPT

## 2024-08-25 PROCEDURE — 87040 BLOOD CULTURE FOR BACTERIA: CPT | Performed by: PHYSICIAN ASSISTANT

## 2024-08-25 PROCEDURE — 93005 ELECTROCARDIOGRAM TRACING: CPT | Performed by: STUDENT IN AN ORGANIZED HEALTH CARE EDUCATION/TRAINING PROGRAM

## 2024-08-25 PROCEDURE — 86140 C-REACTIVE PROTEIN: CPT | Performed by: STUDENT IN AN ORGANIZED HEALTH CARE EDUCATION/TRAINING PROGRAM

## 2024-08-25 PROCEDURE — 87636 SARSCOV2 & INF A&B AMP PRB: CPT | Performed by: PHYSICIAN ASSISTANT

## 2024-08-25 PROCEDURE — 80053 COMPREHEN METABOLIC PANEL: CPT | Performed by: STUDENT IN AN ORGANIZED HEALTH CARE EDUCATION/TRAINING PROGRAM

## 2024-08-25 PROCEDURE — 71045 X-RAY EXAM CHEST 1 VIEW: CPT | Performed by: RADIOLOGY

## 2024-08-25 PROCEDURE — 71275 CT ANGIOGRAPHY CHEST: CPT | Performed by: RADIOLOGY

## 2024-08-25 RX ORDER — BENZONATATE 100 MG/1
100 CAPSULE ORAL 3 TIMES DAILY PRN
Status: DISCONTINUED | OUTPATIENT
Start: 2024-08-25 | End: 2024-08-27 | Stop reason: HOSPADM

## 2024-08-25 RX ORDER — NICOTINE POLACRILEX 4 MG
15 LOZENGE BUCCAL
Status: DISCONTINUED | OUTPATIENT
Start: 2024-08-25 | End: 2024-08-27 | Stop reason: HOSPADM

## 2024-08-25 RX ORDER — SODIUM CHLORIDE 0.9 % (FLUSH) 0.9 %
10 SYRINGE (ML) INJECTION AS NEEDED
Status: DISCONTINUED | OUTPATIENT
Start: 2024-08-25 | End: 2024-08-27 | Stop reason: HOSPADM

## 2024-08-25 RX ORDER — GLUCAGON 1 MG/ML
1 KIT INJECTION
Status: DISCONTINUED | OUTPATIENT
Start: 2024-08-25 | End: 2024-08-27 | Stop reason: HOSPADM

## 2024-08-25 RX ORDER — GUAIFENESIN/DEXTROMETHORPHAN 100-10MG/5
20 SYRUP ORAL EVERY 4 HOURS PRN
Status: DISCONTINUED | OUTPATIENT
Start: 2024-08-25 | End: 2024-08-27 | Stop reason: HOSPADM

## 2024-08-25 RX ORDER — ASPIRIN 81 MG/1
81 TABLET ORAL DAILY
Status: DISCONTINUED | OUTPATIENT
Start: 2024-08-26 | End: 2024-08-27 | Stop reason: HOSPADM

## 2024-08-25 RX ORDER — METHYLPREDNISOLONE SODIUM SUCCINATE 125 MG/2ML
125 INJECTION, POWDER, LYOPHILIZED, FOR SOLUTION INTRAMUSCULAR; INTRAVENOUS ONCE
Status: COMPLETED | OUTPATIENT
Start: 2024-08-25 | End: 2024-08-25

## 2024-08-25 RX ORDER — BISACODYL 5 MG/1
5 TABLET, DELAYED RELEASE ORAL DAILY PRN
Status: DISCONTINUED | OUTPATIENT
Start: 2024-08-25 | End: 2024-08-27 | Stop reason: HOSPADM

## 2024-08-25 RX ORDER — POLYETHYLENE GLYCOL 3350 17 G/17G
17 POWDER, FOR SOLUTION ORAL DAILY PRN
Status: DISCONTINUED | OUTPATIENT
Start: 2024-08-25 | End: 2024-08-27 | Stop reason: HOSPADM

## 2024-08-25 RX ORDER — SODIUM CHLORIDE 9 MG/ML
40 INJECTION, SOLUTION INTRAVENOUS AS NEEDED
Status: DISCONTINUED | OUTPATIENT
Start: 2024-08-25 | End: 2024-08-27 | Stop reason: HOSPADM

## 2024-08-25 RX ORDER — DEXAMETHASONE 4 MG/1
6 TABLET ORAL
Status: DISCONTINUED | OUTPATIENT
Start: 2024-08-26 | End: 2024-08-27 | Stop reason: HOSPADM

## 2024-08-25 RX ORDER — ASPIRIN 81 MG/1
324 TABLET, CHEWABLE ORAL ONCE
Status: COMPLETED | OUTPATIENT
Start: 2024-08-25 | End: 2024-08-25

## 2024-08-25 RX ORDER — SODIUM CHLORIDE 0.9 % (FLUSH) 0.9 %
10 SYRINGE (ML) INJECTION EVERY 12 HOURS SCHEDULED
Status: DISCONTINUED | OUTPATIENT
Start: 2024-08-25 | End: 2024-08-27 | Stop reason: HOSPADM

## 2024-08-25 RX ORDER — NITROGLYCERIN 0.4 MG/1
0.4 TABLET SUBLINGUAL
Status: DISCONTINUED | OUTPATIENT
Start: 2024-08-25 | End: 2024-08-27 | Stop reason: HOSPADM

## 2024-08-25 RX ORDER — IPRATROPIUM BROMIDE AND ALBUTEROL SULFATE 2.5; .5 MG/3ML; MG/3ML
3 SOLUTION RESPIRATORY (INHALATION) ONCE
Status: COMPLETED | OUTPATIENT
Start: 2024-08-25 | End: 2024-08-25

## 2024-08-25 RX ORDER — HEPARIN SODIUM 5000 [USP'U]/ML
5000 INJECTION, SOLUTION INTRAVENOUS; SUBCUTANEOUS EVERY 12 HOURS SCHEDULED
Status: DISCONTINUED | OUTPATIENT
Start: 2024-08-26 | End: 2024-08-27 | Stop reason: HOSPADM

## 2024-08-25 RX ORDER — AMOXICILLIN 250 MG
2 CAPSULE ORAL 2 TIMES DAILY PRN
Status: DISCONTINUED | OUTPATIENT
Start: 2024-08-25 | End: 2024-08-27 | Stop reason: HOSPADM

## 2024-08-25 RX ORDER — IOPAMIDOL 755 MG/ML
100 INJECTION, SOLUTION INTRAVASCULAR
Status: COMPLETED | OUTPATIENT
Start: 2024-08-25 | End: 2024-08-25

## 2024-08-25 RX ORDER — ACETAMINOPHEN 650 MG/1
650 SUPPOSITORY RECTAL EVERY 4 HOURS PRN
Status: DISCONTINUED | OUTPATIENT
Start: 2024-08-25 | End: 2024-08-27 | Stop reason: HOSPADM

## 2024-08-25 RX ORDER — BISACODYL 10 MG
10 SUPPOSITORY, RECTAL RECTAL DAILY PRN
Status: DISCONTINUED | OUTPATIENT
Start: 2024-08-25 | End: 2024-08-27 | Stop reason: HOSPADM

## 2024-08-25 RX ORDER — HEPARIN SODIUM 5000 [USP'U]/ML
5000 INJECTION, SOLUTION INTRAVENOUS; SUBCUTANEOUS EVERY 12 HOURS SCHEDULED
Status: DISCONTINUED | OUTPATIENT
Start: 2024-08-25 | End: 2024-08-25

## 2024-08-25 RX ORDER — ACETAMINOPHEN 325 MG/1
650 TABLET ORAL EVERY 4 HOURS PRN
Status: DISCONTINUED | OUTPATIENT
Start: 2024-08-25 | End: 2024-08-27 | Stop reason: HOSPADM

## 2024-08-25 RX ORDER — DEXTROSE MONOHYDRATE 25 G/50ML
25 INJECTION, SOLUTION INTRAVENOUS
Status: DISCONTINUED | OUTPATIENT
Start: 2024-08-25 | End: 2024-08-27 | Stop reason: HOSPADM

## 2024-08-25 RX ADMIN — IPRATROPIUM BROMIDE AND ALBUTEROL SULFATE 3 ML: .5; 2.5 SOLUTION RESPIRATORY (INHALATION) at 16:17

## 2024-08-25 RX ADMIN — Medication 10 ML: at 23:55

## 2024-08-25 RX ADMIN — IOPAMIDOL 70 ML: 755 INJECTION, SOLUTION INTRAVENOUS at 21:12

## 2024-08-25 RX ADMIN — METHYLPREDNISOLONE SODIUM SUCCINATE 125 MG: 125 INJECTION, POWDER, FOR SOLUTION INTRAMUSCULAR; INTRAVENOUS at 16:16

## 2024-08-25 RX ADMIN — SODIUM CHLORIDE 2022 ML: 9 INJECTION, SOLUTION INTRAVENOUS at 16:16

## 2024-08-25 RX ADMIN — ASPIRIN 324 MG: 81 TABLET, CHEWABLE ORAL at 15:40

## 2024-08-25 RX ADMIN — HEPARIN SODIUM 5000 UNITS: 5000 INJECTION INTRAVENOUS; SUBCUTANEOUS at 23:54

## 2024-08-25 NOTE — H&P
[facility]    Patient Identification:  Name:  Jie Aragon  Age:  49 y.o.  Sex:  female  :  1975  MRN:  [id number]   Visit Number:  [id number]  Admit Date: 2024   Room number:  3339/1P  Primary Care Physician:  Adela Gtz APRN    Date of Admission: 2024     Subjective     Chief complaint:    Chief Complaint   Patient presents with    Chest Pain     History of presenting illness:  49 y.o. female who was admitted on 2024 from the ED with fatigue and dyspnea on exertion for one day.  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  She started cycle 3 of chemo, with her last dose being 2024.  She also had malignancy related pain and chemo induced neuropathy.  The patient states that she woke up on day of admission with severe fatigue and fever of over 101 at home.  She then noticed dyspnea on exertion without any cough.  The patient also denied upper respiratory symptoms.  She did have some chest pain at rest that she thought was pleurisy, as it worsened with deep inspiration and with movement.  She describes the chest pain as sharp, substernal, with radiation to the left jaw; it was also associated with nausea.  She has never had a heart attack before and no one in her family had early onset heart disease.  In the ED, the patient tested positive for COVID-19; she was not hypoxic.  She did have an elevated WBC and heart rate.  Due to her immunocompromised state and SIRS criteria, the patient is being admitted to the medical surgical floor for further evaluation and treatment.  The patient does not recall anyone in particular that she has been around lately that is  ill.  ---------------------------------------------------------------------------------------------------------------------   Review of Systems   Constitutional:  Positive for fatigue and fever (Over 101 at home). Negative for appetite change, chills and diaphoresis.   HENT:  Negative for congestion and rhinorrhea.         No loss of taste and smell   Eyes:  Negative for discharge and redness.   Respiratory:  Positive for shortness of breath. Negative for cough.    Cardiovascular:  Positive for chest pain (Radiated to the left jaw and was associated with nausea). Negative for leg swelling.   Gastrointestinal:  Positive for nausea. Negative for diarrhea and vomiting.   Genitourinary:  Negative for dysuria and hematuria.   Musculoskeletal:  Negative for arthralgias and myalgias.   Skin:  Negative for pallor, rash and wound.   Neurological:  Positive for weakness (Generalized) and headaches. Negative for facial asymmetry, speech difficulty and light-headedness.   Psychiatric/Behavioral:  Negative for agitation, behavioral problems and confusion.      ---------------------------------------------------------------------------------------------------------------------   Past Medical History:   Diagnosis Date    Change in vision     Multiple gestation     Ovarian cancer 05/24/2024    Wears dentures     UPPER AND LOWER     Past Surgical History:   Procedure Laterality Date    APPENDECTOMY N/A 5/23/2024    Procedure: APPENDECTOMY;  Surgeon: Shanda Dubois MD;  Location: [facility];  Service: Gynecology Oncology;  Laterality: N/A;    CYSTOSCOPY Bilateral 5/23/2024    Procedure: CYSTOSCOPY TEMPORARY PLACEMENT BILATERAL URETERAL CATHETER;  Surgeon: Shanda Dubois MD;  Location: [facility];  Service: Gynecology Oncology;  Laterality: Bilateral;    EXPLORATORY LAPAROTOMY, TOTAL ABDOMINAL HYSTERECTOMY SALPINGO OOPHORECTOMY N/A 5/23/2024    Procedure: EXPLORATORY LAPAROTOMY, TOTAL ABDOMINAL HYSTERECTOMY BILATERAL SALPINGO  OOPHORECTOMY, CANCER STAGING/ OPTIMAL DEBULKLING (R=0);  Surgeon: Shanda Dubois MD;  Location:  [facility];  Service: Gynecology Oncology;  Laterality: N/A;    OMENTECTOMY N/A 5/23/2024    Procedure: OMENTECTOMY;  Surgeon: Shanda Dubois MD;  Location:  [facility];  Service: Gynecology Oncology;  Laterality: N/A;    PORTACATH PLACEMENT Right 6/24/2024    Procedure: INSERTION OF PORTACATH;  Surgeon: Yakov Chung MD;  Location:  [facility];  Service: General;  Laterality: Right;    TUBAL ABDOMINAL LIGATION       Family History   Problem Relation Age of Onset    Stroke Father     Hypertension Father     Heart attack Father     Deep vein thrombosis Mother     Leukemia Brother     Testicular cancer Son      Social History     Socioeconomic History    Marital status: Single   Tobacco Use    Smoking status: Every Day     Current packs/day: 1.00     Average packs/day: 1 pack/day for 15.0 years (15.0 ttl pk-yrs)     Types: Cigarettes     Passive exposure: Current    Smokeless tobacco: Never   Vaping Use    Vaping status: Never Used   Substance and Sexual Activity    Alcohol use: Never    Drug use: Never    Sexual activity: Yes     Partners: Male     Birth control/protection: I.U.D., Tubal ligation     ---------------------------------------------------------------------------------------------------------------------   Allergies:  Amoxicillin and Penicillins  ---------------------------------------------------------------------------------------------------------------------   Medications below are reported home medications pulling from within the system; at this time, these medications have not been reconciled unless otherwise specified and are in the verification process for further verification as current home medications.      Prior to Admission Medications       Prescriptions Last Dose Informant Patient Reported? Taking?    acetaminophen (TYLENOL) 325 MG tablet   No No    Take 2 tablets by mouth Every 6 Hours.     docusate sodium (COLACE) 100 MG capsule   No No    Take 2 capsules by mouth 2 (Two) Times a Day As Needed for Constipation. Two capusles    gabapentin (NEURONTIN) 100 MG capsule   No No    Take 1 capsule by mouth At Night As Needed (neuropathy).    granisetron (SANCUSO) 3.1 MG/24HR   No No    Place 1 patch on the skin as directed by provider 1 (One) Time Per Week.    ibuprofen (ADVIL,MOTRIN) 800 MG tablet   No No    Take 1 tablet by mouth Every 8 (Eight) Hours As Needed for Mild Pain.    lidocaine-prilocaine (EMLA) 2.5-2.5 % cream   No No    Apply to port-a-cath site 30 minutes prior to arrival at infusion center. Cover with plastic wrap.    loratadine (CLARITIN) 10 MG tablet   No No    Take 1 tablet by mouth weekly starting the day of growth factor injection.    naloxone (NARCAN) 4 MG/0.1ML nasal spray   No No    Call 911. Don't prime. [place] in 1 nostril for overdose. Repeat in 2-3 minutes in other nostril if no or minimal breathing/responsiveness.    nicotine (Nicotine Step 1) 21 MG/24HR patch   No No    Place 1 patch on the skin as directed by provider Daily.    nicotine (Nicotine Step 2) 14 MG/24HR patch   No No    Place 1 patch on the skin as directed by provider Daily.    nicotine (Nicotine Step 3) 7 MG/24HR patch   No No    Place 1 patch on the skin as directed by provider Daily.    OLANZapine (ZyPREXA) 5 MG tablet   No No    Take 1 tablet by mouth Every Night. Take nightly x 4 starting night of chemotherapy.    ondansetron (ZOFRAN) 8 MG tablet   No No    Take 1 tablet by mouth 3 (Three) Times a Day As Needed for Nausea or Vomiting.    oxyCODONE (OXY-IR) 5 MG capsule   Yes No    Take 1 capsule by mouth As Needed for Moderate Pain.    polyethylene glycol (MIRALAX) 17 GM/SCOOP powder   No No    Take 17 g by mouth Daily.    prochlorperazine (COMPAZINE) 10 MG tablet   No No    Take 1 tablet by mouth Every 6 (Six) Hours As Needed for Nausea or Vomiting.    pyridoxine (VITAMIN B-6) 50 MG tablet tablet   No No     Take 1 tablet by mouth Daily.    rivaroxaban (Xarelto) 10 MG tablet   No No    Take 1 tablet by mouth Daily.    sertraline (Zoloft) 50 MG tablet   No No    Take 1 tablet by mouth Daily.          Objective     Vital Signs:  Temp:  [98 °F (36.7 °C)] 98 °F (36.7 °C)  Heart Rate:  [] 77  Resp:  [16-20] 20  BP: ()/(59-79) 104/59    Mean Arterial Pressure (Non-Invasive) for the past 24 hrs (Last 3 readings):   Noninvasive MAP (mmHg)   08/25/24 2048 79   08/25/24 2026 77   08/25/24 1956 68     SpO2:  [96 %-100 %] 97 %  on   ;   Device (Oxygen Therapy): room air  Body mass index is 21.86 kg/m².    Wt Readings from Last 3 Encounters:   08/25/24 67.1 kg (148 lb)   08/12/24 67.4 kg (148 lb 9.6 oz)   08/12/24 67.4 kg (148 lb 9.6 oz)      ----------------------------------------------------------------------------------------------------------------------  Physical Exam  Vitals and nursing note reviewed. Exam conducted with a chaperone present.   Constitutional:       General: She is awake. She is in acute distress.      Appearance: She is well-developed. She is not toxic-appearing or diaphoretic.      Comments: On room air.   HENT:      Head: Normocephalic and atraumatic.      Right Ear: External ear normal.      Left Ear: External ear normal.      Nose: Nose normal.   Eyes:      General: No scleral icterus.        Right eye: No discharge.         Left eye: No discharge.      Extraocular Movements: Extraocular movements intact.      Conjunctiva/sclera: Conjunctivae normal.      Pupils: Pupils are equal, round, and reactive to light.   Cardiovascular:      Rate and Rhythm: Normal rate and regular rhythm.      Pulses: Normal pulses.      Heart sounds: No murmur heard.  Pulmonary:      Effort: Pulmonary effort is normal. Respiratory distress present. No accessory muscle usage or prolonged expiration.      Breath sounds: No stridor or decreased air movement. No wheezing or rales.   Abdominal:      General: Bowel sounds  are normal. There is no distension.      Palpations: Abdomen is soft.   Musculoskeletal:         General: No swelling or deformity.   Skin:     Capillary Refill: Capillary refill takes less than 2 seconds.      Coloration: Skin is not jaundiced or pale.   Neurological:      Mental Status: She is alert and oriented to person, place, and time. Mental status is at baseline.      Cranial Nerves: No cranial nerve deficit.   Psychiatric:         Mood and Affect: Mood normal.         Behavior: Behavior normal. Behavior is cooperative.         Thought Content: Thought content normal.         Judgment: Judgment normal.       ---------------------------------------------------------------------------------------------------------------------  EKG:  Sinus tachycardia with heart rate 124, QTc 402 ms, inverted T waves and mild ST depressions in the inferior and lateral leads.  When compared to an EKG dated 5/26/2024, the inverted T waves and the ST depressions are new findings.  Please note that I have personally looked at the EKG from this admission, the comparison EKG in the medical records, and the above is my interpretation of this admission's EKG; I await the final cardiology read.      Telemetry:  NS with heart rates 70-80s.  Please note that I personally looked at the telemetry strips.    --------------------------------------------------------------------------------------------------------------------  LABS:    CBC and coagulation:  Results from last 7 days   Lab Units 08/26/24  0143 08/25/24  1615 08/25/24  1531   LACTATE mmol/L  --  1.2  --    CRP mg/dL  --   --  <0.30   WBC 10*3/mm3 14.11*  --  20.45*   HEMOGLOBIN g/dL 10.2*  --  11.4*   HEMATOCRIT % 31.2*  --  33.2*   MCV fL 95.4  --  90.5   MCHC g/dL 32.7  --  34.3   PLATELETS 10*3/mm3 165  --  199     Renal and electrolytes:  Results from last 7 days   Lab Units 08/26/24  0143 08/25/24  1734 08/25/24  1531   SODIUM mmol/L 138  --  135*   POTASSIUM mmol/L 4.3  --   3.8   MAGNESIUM mg/dL 2.2 1.6  --    CHLORIDE mmol/L 106  --  100   CO2 mmol/L 19.3*  --  21.3*   BUN mg/dL 8  --  7   CREATININE mg/dL 0.56*  --  0.69   CALCIUM mg/dL 9.1  --  9.6   PHOSPHORUS mg/dL 3.7  --   --    GLUCOSE mg/dL 177*  --  89   ANION GAP mmol/L 12.7  --  13.7     Estimated Creatinine Clearance: 127.3 mL/min (A) (by C-G formula based on SCr of 0.56 mg/dL (L)).    Liver and pancreatic function:  Results from last 7 days   Lab Units 08/26/24  0143 08/25/24  1531   ALBUMIN g/dL 3.9 4.3   BILIRUBIN mg/dL <0.2 <0.2   ALK PHOS U/L 176* 205*   AST (SGOT) U/L 28 29   ALT (SGPT) U/L 48* 54*     Endocrine function:  Lab Results   Component Value Date    HGBA1C 5.90 (H) 08/25/2024     Lab Results   Component Value Date    TSH 3.860 05/26/2024     Cardiac:  Results from last 7 days   Lab Units 08/26/24  0143 08/25/24  1734 08/25/24  1531   CK TOTAL U/L  --  35  --    HSTROP T ng/L <6 <6 <6     Results from last 7 days   Lab Units 08/26/24  0143   CHOLESTEROL mg/dL 236*   TRIGLYCERIDES mg/dL 73   HDL CHOL mg/dL 40   LDL CHOL mg/dL 183*     Cultures:  Microbiology Results (last 10 days)       Procedure Component Value - Date/Time    COVID-19 and FLU A/B PCR, 1 HR TAT - Swab, Nasopharynx [[id number]]  (Abnormal) Collected: 08/25/24 1647    Lab Status: Final result Specimen: Swab from Nasopharynx Updated: 08/25/24 1714     COVID19 Detected     Influenza A PCR Not Detected     Influenza B PCR Not Detected    Narrative:      Fact sheet for providers: https://www.fda.gov/media/330760/download    Fact sheet for patients: https://www.fda.gov/media/490576/download    Test performed by PCR.  Influenza A and Influenza B negative results should be considered presumptive in samples that have a positive SARS-CoV-2 result.    Competitive inhibition studies showed that SARS-CoV-2 virus, when present at concentrations above 3.6E+04 copies/mL, can inhibit the detection and amplification of influenza A and influenza B virus RNA if  present at or below 1.8E+02 copies/mL or 4.9E+02 copies/mL, respectively, and may lead to false negative influenza virus results. If co-infection with influenza A or influenza B virus is suspected in samples with a positive SARS-CoV-2 result, the sample should be re-tested with another FDA cleared, approved, or authorized influenza test, if influenza virus detection would change clinical management.          I have personally looked at the labs and they are summarized above.  ----------------------------------------------------------------------------------------------------------------------  Detailed radiology reports for the last 24 hours:    Imaging Results (Last 24 Hours)       Procedure Component Value Units Date/Time    CT Angiogram Chest Pulmonary Embolism [[id number]] Collected: 08/25/24 2140     Updated: 08/25/24 2146    Narrative:      PROCEDURE: CT angiography of the chest performed with IV contrast on  August 25, 2024. The examination was performed with 1 and 2 mm axial  imaging. The examination was performed according to as low as reasonably  achievable dose protocol. Total . The patient was administered 70  cc of Isovue-370 contrast IV without complication. 3D surface display  images were performed and projected in multiple planes. Sagittal and  coronal reconstruction images performed.     HISTORY: Chest pain. Shortness of breath. COVID-positive.     COMPARISON: None.     FINDINGS:     Infusion port noted in the upper anterior right chest wall with catheter  tip to the SVC.  Normal heart size with no pericardial effusion.  No thoracic aortic aneurysm or dissection.  Normal pulmonary embolus.  Small bilateral apical subpleural cysts.  No bronchial inflammation.   No interstitial edema.   No pleural effusion.   No pneumothorax.  Normal thyroid gland.       Impression:      Impression:     1.  Normal heart size.  2.  No thoracic aortic aneurysm or dissection.  3.  No pulmonary embolus.  4.  No  bronchial inflammation.  5.  No mediastinal or hilar adenopathy.  6.  No features to suggest multifocal pneumonia.    This report was finalized on 8/25/2024 9:44 PM by Manuel Nino MD.       XR Chest 1 View [[id number]] Collected: 08/25/24 1721     Updated: 08/25/24 1724    Narrative:      INDICATION: Chest pain.     TECHNIQUE: Frontal radiograph of the chest.     COMPARISON: 6/24/2024.     FINDINGS:   The cardiomediastinal silhouette and pulmonary vasculature appear within  normal limits. No infiltrate, pleural effusion or pneumothorax. No acute  osseous abnormality evident. Right-sided chest port catheter tip in the  superior vena cava.       Impression:      No acute cardiopulmonary process.     This report was finalized on 8/25/2024 5:22 PM by Alex Pallas, DO.           I have personally looked at the radiology images and I have read the available final reports.    Assessment & Plan       -Sepsis per CMS criteria, present on admission (WBC 20,450, heart rates 101-120), due to an acute COVID-19  -New inferior and lateral EKGs changes, present on admission  -History of stage II bilateral ovarian cancer, status post hysterectomy, salpingo-oophorectomy, and chemotherapy (Carboplatin/Taxol, on cycle 3 and the last dose was 8/12/2024)  -History of malignancy associated pain  -History of chemotherapy induced neuropathy  -Smoking use disorder    Admitted to the medical surgical floor.  Will place in COVID-19 isolation.  Will give empiric Paxlovid and Decadron, as the patient is high risk due to the recent use of chemotherapy.  Will also give her aspirin, obtain an ECHO, and obtain serial troponin levels and EKGs.  Will hold off on a statin for now as the liver enzymes are mildly elevated and she is at risk of more elevation due to the COVID-19.  Will repeat labs in the am.  Will give a nicotine patch for the smoking addiction.  Her blood pressures are low normal range and thus we will monitor them closely.    VTE  Prophylaxis:  DVT dose of subcutaneous heparin    The patient is high risk due to the following diagnoses/reasons:  Sepsis that was present on admission    Janeth Padilla MD  [facility]  08/26/24  03:55 EDT

## 2024-08-26 ENCOUNTER — APPOINTMENT (OUTPATIENT)
Dept: CARDIOLOGY | Facility: HOSPITAL | Age: 49
DRG: 871 | End: 2024-08-26
Payer: COMMERCIAL

## 2024-08-26 LAB
ALBUMIN SERPL-MCNC: 3.9 G/DL (ref 3.5–5.2)
ALBUMIN/GLOB SERPL: 1.5 G/DL
ALP SERPL-CCNC: 176 U/L (ref 39–117)
ALT SERPL W P-5'-P-CCNC: 48 U/L (ref 1–33)
ANION GAP SERPL CALCULATED.3IONS-SCNC: 12.7 MMOL/L (ref 5–15)
AST SERPL-CCNC: 28 U/L (ref 1–32)
BASOPHILS # BLD AUTO: 0.03 10*3/MM3 (ref 0–0.2)
BASOPHILS NFR BLD AUTO: 0.2 % (ref 0–1.5)
BILIRUB SERPL-MCNC: <0.2 MG/DL (ref 0–1.2)
BUN SERPL-MCNC: 8 MG/DL (ref 6–20)
BUN/CREAT SERPL: 14.3 (ref 7–25)
CALCIUM SPEC-SCNC: 9.1 MG/DL (ref 8.6–10.5)
CHLORIDE SERPL-SCNC: 106 MMOL/L (ref 98–107)
CHOLEST SERPL-MCNC: 236 MG/DL (ref 0–200)
CO2 SERPL-SCNC: 19.3 MMOL/L (ref 22–29)
CREAT SERPL-MCNC: 0.56 MG/DL (ref 0.57–1)
DEPRECATED RDW RBC AUTO: 60.7 FL (ref 37–54)
EGFRCR SERPLBLD CKD-EPI 2021: 111.3 ML/MIN/1.73
EOSINOPHIL # BLD AUTO: 0 10*3/MM3 (ref 0–0.4)
EOSINOPHIL NFR BLD AUTO: 0 % (ref 0.3–6.2)
ERYTHROCYTE [DISTWIDTH] IN BLOOD BY AUTOMATED COUNT: 17.8 % (ref 12.3–15.4)
GLOBULIN UR ELPH-MCNC: 2.6 GM/DL
GLUCOSE SERPL-MCNC: 177 MG/DL (ref 65–99)
HCT VFR BLD AUTO: 31.2 % (ref 34–46.6)
HDLC SERPL-MCNC: 40 MG/DL (ref 40–60)
HGB BLD-MCNC: 10.2 G/DL (ref 12–15.9)
IMM GRANULOCYTES # BLD AUTO: 0.17 10*3/MM3 (ref 0–0.05)
IMM GRANULOCYTES NFR BLD AUTO: 1.2 % (ref 0–0.5)
LDLC SERPL CALC-MCNC: 183 MG/DL (ref 0–100)
LDLC/HDLC SERPL: 4.54 {RATIO}
LYMPHOCYTES # BLD AUTO: 0.41 10*3/MM3 (ref 0.7–3.1)
LYMPHOCYTES NFR BLD AUTO: 2.9 % (ref 19.6–45.3)
MAGNESIUM SERPL-MCNC: 2.2 MG/DL (ref 1.6–2.6)
MCH RBC QN AUTO: 31.2 PG (ref 26.6–33)
MCHC RBC AUTO-ENTMCNC: 32.7 G/DL (ref 31.5–35.7)
MCV RBC AUTO: 95.4 FL (ref 79–97)
MONOCYTES # BLD AUTO: 0.16 10*3/MM3 (ref 0.1–0.9)
MONOCYTES NFR BLD AUTO: 1.1 % (ref 5–12)
NEUTROPHILS NFR BLD AUTO: 13.34 10*3/MM3 (ref 1.7–7)
NEUTROPHILS NFR BLD AUTO: 94.6 % (ref 42.7–76)
NRBC BLD AUTO-RTO: 0 /100 WBC (ref 0–0.2)
PHOSPHATE SERPL-MCNC: 3.7 MG/DL (ref 2.5–4.5)
PLATELET # BLD AUTO: 165 10*3/MM3 (ref 140–450)
PMV BLD AUTO: 9.5 FL (ref 6–12)
POTASSIUM SERPL-SCNC: 4.3 MMOL/L (ref 3.5–5.2)
PROT SERPL-MCNC: 6.5 G/DL (ref 6–8.5)
QT INTERVAL: 278 MS
QT INTERVAL: 428 MS
QTC INTERVAL: 402 MS
QTC INTERVAL: 455 MS
RBC # BLD AUTO: 3.27 10*6/MM3 (ref 3.77–5.28)
SODIUM SERPL-SCNC: 138 MMOL/L (ref 136–145)
TRIGL SERPL-MCNC: 73 MG/DL (ref 0–150)
TROPONIN T SERPL HS-MCNC: <6 NG/L
VLDLC SERPL-MCNC: 13 MG/DL (ref 5–40)
WBC NRBC COR # BLD AUTO: 14.11 10*3/MM3 (ref 3.4–10.8)

## 2024-08-26 PROCEDURE — 63710000001 DEXAMETHASONE PER 0.25 MG: Performed by: INTERNAL MEDICINE

## 2024-08-26 PROCEDURE — 85025 COMPLETE CBC W/AUTO DIFF WBC: CPT | Performed by: INTERNAL MEDICINE

## 2024-08-26 PROCEDURE — 25010000002 HEPARIN (PORCINE) PER 1000 UNITS: Performed by: INTERNAL MEDICINE

## 2024-08-26 PROCEDURE — 80061 LIPID PANEL: CPT | Performed by: INTERNAL MEDICINE

## 2024-08-26 PROCEDURE — 99232 SBSQ HOSP IP/OBS MODERATE 35: CPT | Performed by: STUDENT IN AN ORGANIZED HEALTH CARE EDUCATION/TRAINING PROGRAM

## 2024-08-26 PROCEDURE — 80053 COMPREHEN METABOLIC PANEL: CPT | Performed by: INTERNAL MEDICINE

## 2024-08-26 PROCEDURE — 83735 ASSAY OF MAGNESIUM: CPT | Performed by: INTERNAL MEDICINE

## 2024-08-26 PROCEDURE — 93005 ELECTROCARDIOGRAM TRACING: CPT | Performed by: INTERNAL MEDICINE

## 2024-08-26 PROCEDURE — 84100 ASSAY OF PHOSPHORUS: CPT | Performed by: INTERNAL MEDICINE

## 2024-08-26 PROCEDURE — 84484 ASSAY OF TROPONIN QUANT: CPT | Performed by: INTERNAL MEDICINE

## 2024-08-26 RX ORDER — PROCHLORPERAZINE MALEATE 5 MG
10 TABLET ORAL EVERY 6 HOURS PRN
Status: DISCONTINUED | OUTPATIENT
Start: 2024-08-26 | End: 2024-08-27 | Stop reason: HOSPADM

## 2024-08-26 RX ORDER — LANOLIN ALCOHOL/MO/W.PET/CERES
50 CREAM (GRAM) TOPICAL DAILY
Status: DISCONTINUED | OUTPATIENT
Start: 2024-08-26 | End: 2024-08-27 | Stop reason: HOSPADM

## 2024-08-26 RX ORDER — NICOTINE 21 MG/24HR
1 PATCH, TRANSDERMAL 24 HOURS TRANSDERMAL
Status: DISCONTINUED | OUTPATIENT
Start: 2024-08-26 | End: 2024-08-27 | Stop reason: HOSPADM

## 2024-08-26 RX ORDER — GABAPENTIN 100 MG/1
100 CAPSULE ORAL NIGHTLY PRN
Status: DISCONTINUED | OUTPATIENT
Start: 2024-08-26 | End: 2024-08-27 | Stop reason: HOSPADM

## 2024-08-26 RX ORDER — IBUPROFEN 400 MG/1
800 TABLET, FILM COATED ORAL EVERY 8 HOURS PRN
Status: DISCONTINUED | OUTPATIENT
Start: 2024-08-26 | End: 2024-08-27 | Stop reason: HOSPADM

## 2024-08-26 RX ADMIN — NIRMATRELVIR AND RITONAVIR 3 TABLET: KIT at 00:06

## 2024-08-26 RX ADMIN — HEPARIN SODIUM 5000 UNITS: 5000 INJECTION INTRAVENOUS; SUBCUTANEOUS at 21:33

## 2024-08-26 RX ADMIN — HEPARIN SODIUM 5000 UNITS: 5000 INJECTION INTRAVENOUS; SUBCUTANEOUS at 09:01

## 2024-08-26 RX ADMIN — NIRMATRELVIR AND RITONAVIR 3 TABLET: KIT at 21:33

## 2024-08-26 RX ADMIN — Medication 10 ML: at 09:07

## 2024-08-26 RX ADMIN — NICOTINE 1 PATCH: 21 PATCH TRANSDERMAL at 09:02

## 2024-08-26 RX ADMIN — SERTRALINE 50 MG: 50 TABLET, FILM COATED ORAL at 09:01

## 2024-08-26 RX ADMIN — ASPIRIN 81 MG: 81 TABLET, COATED ORAL at 09:01

## 2024-08-26 RX ADMIN — DEXAMETHASONE 6 MG: 4 TABLET ORAL at 09:01

## 2024-08-26 RX ADMIN — Medication 10 ML: at 21:37

## 2024-08-26 RX ADMIN — NIRMATRELVIR AND RITONAVIR 3 TABLET: KIT at 09:01

## 2024-08-26 RX ADMIN — Medication 50 MG: at 09:14

## 2024-08-26 NOTE — PLAN OF CARE
Goal Outcome Evaluation:  Plan of Care Reviewed With: patient        Progress: no change  Outcome Evaluation: Patient is resting in bed. Patient is alert and oriented. No acute changes or complaints. Will continue with plan of care.

## 2024-08-26 NOTE — PROGRESS NOTES
[facility] HOSPITALIST PROGRESS NOTE     Patient Identification:  Name:  Jie Aragon  Age:  49 y.o.  Sex:  female  :  1975  MRN:  [id number]  Visit Number:  [id number]  ROOM: [address] [place] [address]     Primary Care Provider:  Adela Gtz APRN    Length of stay in inpatient status:  1    Subjective     Chief Compliant:    Chief Complaint   Patient presents with    Chest Pain       History of Presenting Illness:    Patient seen and examined today with family member present at bedside. She reports she is feeling better today. Reports shortness of breath has improved and she denied productive cough. Denied any chest pain.    Objective     Current Hospital Meds:aspirin, 81 mg, Oral, Daily  dexAMETHasone, 6 mg, Oral, Daily With Breakfast  heparin (porcine), 5,000 Units, Subcutaneous, Q12H  nicotine, 1 patch, Transdermal, Q24H  Nirmatrelvir & Ritonavir (300mg/100mg), 3 tablet, Oral, BID  sertraline, 50 mg, Oral, Daily  sodium chloride, 10 mL, Intravenous, Q12H  pyridoxine, 50 mg, Oral, Daily         Current Antimicrobial Therapy:  Anti-Infectives (From admission, onward)      Ordered     Dose/Rate Route Frequency Start Stop    24  nirmatrelvir and ritonavir (300mg/100mg)(PAXLOVID) 3 tablet pack        Note to Pharmacy: Not authorized for use in patients younger than 12 years.   Ordering Provider: Janeth Padilla MD    3 tablet Oral 2 Times Daily 24 0000 24          Current Diuretic Therapy:  Diuretics (From admission, onward)      None          ----------------------------------------------------------------------------------------------------------------------  Vital Signs:  Temp:  [97.9 °F (36.6 °C)-98.4 °F (36.9 °C)] 98.4 °F (36.9 °C)  Heart Rate:  [68-92] 69  Resp:  [20] 20  BP: ()/(56-68) 98/58  SpO2:  [97 %-100 %] 97 %  on   ;   Device (Oxygen Therapy): room air  Body mass index is 21.86 kg/m².    Wt Readings from Last 3 Encounters:   24 67.1 kg  (148 lb)   08/12/24 67.4 kg (148 lb 9.6 oz)   08/12/24 67.4 kg (148 lb 9.6 oz)     Intake & Output (last 3 days)         08/23 0701 08/24 0700 08/24 0701 08/25 0700 08/25 0701 08/26 0700 08/26 0701 08/27 0700    P.O.   240 360    Total Intake(mL/kg)   240 (3.6) 360 (5.4)    Net   +240 +360            Urine Unmeasured Occurrence    3 x          Diet: Regular/House; Fluid Consistency: Thin (IDDSI 0)  ----------------------------------------------------------------------------------------------------------------------  Physical exam:   Constitutional:  Well-developed and well-nourished. No acute distress.      HENT:  Head:  Normocephalic and atraumatic.    Cardiovascular:  Normal rate, regular rhythm   Pulmonary/Chest:  No respiratory distress, no wheezes, no crackles, no rhonchi, good air movement   Musculoskeletal:  No deformity.     Neurological: Awake, alert, no focal deficit on gross examination. No slurred speech or facial droop.   Skin:  Skin is warm and dry.   Peripheral vascular:  No cyanosis, no edema.  Psychiatric: Appropriate mood and affect  Edited by: Jazmine Woo DO at 8/26/2024 1800  ----------------------------------------------------------------------------------------------------------------------  Results from last 7 days   Lab Units 08/26/24  0143 08/25/24  1615 08/25/24  1531   CRP mg/dL  --   --  <0.30   LACTATE mmol/L  --  1.2  --    WBC 10*3/mm3 14.11*  --  20.45*   HEMOGLOBIN g/dL 10.2*  --  11.4*   HEMATOCRIT % 31.2*  --  33.2*   MCV fL 95.4  --  90.5   MCHC g/dL 32.7  --  34.3   PLATELETS 10*3/mm3 165  --  199         Results from last 7 days   Lab Units 08/26/24  0143 08/25/24  1734 08/25/24  1531   SODIUM mmol/L 138  --  135*   POTASSIUM mmol/L 4.3  --  3.8   MAGNESIUM mg/dL 2.2 1.6  --    CHLORIDE mmol/L 106  --  100   CO2 mmol/L 19.3*  --  21.3*   BUN mg/dL 8  --  7   CREATININE mg/dL 0.56*  --  0.69   CALCIUM mg/dL 9.1  --  9.6   PHOSPHORUS mg/dL 3.7  --   --    GLUCOSE mg/dL  "177*  --  89   ALBUMIN g/dL 3.9  --  4.3   BILIRUBIN mg/dL <0.2  --  <0.2   ALK PHOS U/L 176*  --  205*   AST (SGOT) U/L 28  --  29   ALT (SGPT) U/L 48*  --  54*   Estimated Creatinine Clearance: 127.3 mL/min (A) (by C-G formula based on SCr of 0.56 mg/dL (L)).  No results found for: \"AMMONIA\"  Results from last 7 days   Lab Units 08/26/24  0143 08/25/24  1734 08/25/24  1531   CK TOTAL U/L  --  35  --    HSTROP T ng/L <6 <6 <6         Results from last 7 days   Lab Units 08/26/24  0143   CHOLESTEROL mg/dL 236*   TRIGLYCERIDES mg/dL 73   HDL CHOL mg/dL 40   LDL CHOL mg/dL 183*     Hemoglobin A1C   Date/Time Value Ref Range Status   08/25/2024 1531 5.90 (H) 4.80 - 5.60 % Final     Lab Results   Component Value Date    TSH 3.860 05/26/2024     Lab Results   Component Value Date    PREGTESTUR Negative 05/21/2024     Pain Management Panel           No data to display              Brief Urine Lab Results  (Last result in the past 365 days)        Color   Clarity   Blood   Leuk Est   Nitrite   Protein   CREAT   Urine HCG        05/23/24 1324               Negative             Blood Culture   Date Value Ref Range Status   08/25/2024 No growth at 24 hours  Preliminary   08/25/2024 No growth at 24 hours  Preliminary     No results found for: \"URINECX\"  No results found for: \"WOUNDCX\"  No results found for: \"STOOLCX\"  No results found for: \"RESPCX\"  No results found for: \"AFBCX\"  Results from last 7 days   Lab Units 08/25/24  1615 08/25/24  1531   LACTATE mmol/L 1.2  --    CRP mg/dL  --  <0.30       I have personally looked at the labs and they are summarized above.  ----------------------------------------------------------------------------------------------------------------------  Detailed radiology reports for the last 24 hours:  Imaging Results (Last 24 Hours)       Procedure Component Value Units Date/Time    CT Angiogram Chest Pulmonary Embolism [[id number]] Collected: 08/25/24 2140     Updated: 08/25/24 2146    " Narrative:      PROCEDURE: CT angiography of the chest performed with IV contrast on  August 25, 2024. The examination was performed with 1 and 2 mm axial  imaging. The examination was performed according to as low as reasonably  achievable dose protocol. Total . The patient was administered 70  cc of Isovue-370 contrast IV without complication. 3D surface display  images were performed and projected in multiple planes. Sagittal and  coronal reconstruction images performed.     HISTORY: Chest pain. Shortness of breath. COVID-positive.     COMPARISON: None.     FINDINGS:     Infusion port noted in the upper anterior right chest wall with catheter  tip to the SVC.  Normal heart size with no pericardial effusion.  No thoracic aortic aneurysm or dissection.  Normal pulmonary embolus.  Small bilateral apical subpleural cysts.  No bronchial inflammation.   No interstitial edema.   No pleural effusion.   No pneumothorax.  Normal thyroid gland.       Impression:      Impression:     1.  Normal heart size.  2.  No thoracic aortic aneurysm or dissection.  3.  No pulmonary embolus.  4.  No bronchial inflammation.  5.  No mediastinal or hilar adenopathy.  6.  No features to suggest multifocal pneumonia.        This report was finalized on 8/25/2024 9:44 PM by Manuel Nino MD.             Assessment & Plan      #Sepsis due to an acute COVID-19 infection  - Improving. No longer tachycardic and leukocytosis is improving.   - Continue Paxlovid and dexamethasone due to patient being high risk for disease progression in the setting of immunosuppression.  - Not requiring supplemental oxygen. Monitor on pulse oximetry.    #New inferior and lateral EKGs changes, present on admission  - continue aspirin  - troponin was trended x3 and remained normal   - echo ordered. Follow up on results when available.  - monitor on telemetry     #History of stage II bilateral ovarian cancer, status post hysterectomy, salpingo-oophorectomy, and  chemotherapy (Carboplatin/Taxol, on cycle 3 and the last dose was 8/12/2024)  #History of malignancy associated pain  #History of chemotherapy induced neuropathy  #Tobacco use  - continue home medications, supportive care  - plan for outpatient follow up with her oncology provider as scheduled   Edited by: Jazmine Woo DO at 8/26/2024 1687    Active VTE Prophylaxis:  Pharmacologic:        Start     Dose Route Frequency Stop    08/26/24 0015  heparin (porcine) 5000 UNIT/ML injection 5,000 Units         5,000 Units SC Every 12 Hours Scheduled --                    Dispo:  likely home at discharge pending clinical improvement, possibly within 24 hours     Jazmine Woo DO  [facility] Emergency Roomist  08/26/24  18:10 EDT

## 2024-08-26 NOTE — ED PROVIDER NOTES
Subjective   History of Present Illness  Chest pain, soa today   Patient reports that she started having misternal nonradiating chest pain that started one hour prior to arrival. Patien reports that she also feels like she has had increased shortness of breath.  Pt has hx of Ovarian cancer and is currently doing chemo last treatment was 10 days ago   Pt states that she tested pos for covid this am     History provided by:  Patient   used: No    Shortness of Breath  Severity:  Moderate  Onset quality:  Sudden  Timing:  Constant  Progression:  Worsening  Chronicity:  New  Context: activity and URI    Relieved by:  None tried  Worsened by:  Exertion  Ineffective treatments:  None tried  Associated symptoms: cough    Risk factors: hx of cancer        Review of Systems   Respiratory:  Positive for cough and shortness of breath.        Past Medical History:   Diagnosis Date    Change in vision     Multiple gestation     Ovarian cancer 05/24/2024    Wears dentures     UPPER AND LOWER       Allergies   Allergen Reactions    Amoxicillin Other (See Comments)     unknown    Penicillins Other (See Comments)     unknown       Past Surgical History:   Procedure Laterality Date    APPENDECTOMY N/A 5/23/2024    Procedure: APPENDECTOMY;  Surgeon: Shanda Dubois MD;  Location:  [facility];  Service: Gynecology Oncology;  Laterality: N/A;    CYSTOSCOPY Bilateral 5/23/2024    Procedure: CYSTOSCOPY TEMPORARY PLACEMENT BILATERAL URETERAL CATHETER;  Surgeon: Shanda Dubois MD;  Location:  [facility];  Service: Gynecology Oncology;  Laterality: Bilateral;    EXPLORATORY LAPAROTOMY, TOTAL ABDOMINAL HYSTERECTOMY SALPINGO OOPHORECTOMY N/A 5/23/2024    Procedure: EXPLORATORY LAPAROTOMY, TOTAL ABDOMINAL HYSTERECTOMY BILATERAL SALPINGO OOPHORECTOMY, CANCER STAGING/ OPTIMAL DEBULKLING (R=0);  Surgeon: Shanda Dubois MD;  Location:  [facility];  Service: Gynecology Oncology;  Laterality: N/A;    OMENTECTOMY N/A 5/23/2024     Procedure: OMENTECTOMY;  Surgeon: Shanda Dubois MD;  Location:  [facility];  Service: Gynecology Oncology;  Laterality: N/A;    PORTACATH PLACEMENT Right 6/24/2024    Procedure: INSERTION OF PORTACATH;  Surgeon: Yakov Chung MD;  Location:  [facility];  Service: General;  Laterality: Right;    TUBAL ABDOMINAL LIGATION         Family History   Problem Relation Age of Onset    Stroke Father     Hypertension Father     Heart attack Father     Deep vein thrombosis Mother     Leukemia Brother     Testicular cancer Son        Social History     Socioeconomic History    Marital status: Single   Tobacco Use    Smoking status: Every Day     Current packs/day: 1.00     Average packs/day: 1 pack/day for 15.0 years (15.0 ttl pk-yrs)     Types: Cigarettes     Passive exposure: Current    Smokeless tobacco: Never   Vaping Use    Vaping status: Never Used   Substance and Sexual Activity    Alcohol use: Never    Drug use: Never    Sexual activity: Yes     Partners: Male     Birth control/protection: I.U.D., Tubal ligation           Objective   Physical Exam  Vitals and nursing note reviewed.   Constitutional:       Appearance: She is well-developed.   HENT:      Head: Normocephalic.   Cardiovascular:      Rate and Rhythm: Regular rhythm. Tachycardia present.   Pulmonary:      Effort: Pulmonary effort is normal.      Breath sounds: Normal breath sounds.   Abdominal:      General: Bowel sounds are normal.      Palpations: Abdomen is soft.      Tenderness: There is no abdominal tenderness.   Musculoskeletal:         General: Normal range of motion.      Cervical back: Neck supple.   Skin:     General: Skin is warm and dry.   Neurological:      Mental Status: She is alert and oriented to person, place, and time.   Psychiatric:         Behavior: Behavior normal.         Thought Content: Thought content normal.         Judgment: Judgment normal.         Procedures           ED Course  ED Course as of 08/25/24 2103   Sun Aug 25,  2024 1522 EKG interpretation: Sinus tachycardia 126 bpm QTc 402 no ST elevations or depressions.  Electronically signed by Vaughn Paris DO, 08/25/24, 3:23 PM EDT.   [GF]   1944 Pt was discussed with Dr Padilla will admit  [LC]      ED Course User Index  [GF] Vaughn Paris DO  [LC] Angelika Thompson PA                                             Medical Decision Making  Chest pain, soa today   Patient reports that she started having misternal nonradiating chest pain that started one hour prior to arrival. Patien reports that she also feels like she has had increased shortness of breath.  Pt has hx of Ovarian cancer and is currently doing chemo last treatment was 10 days ago   Pt states that she tested pos for covid this am   Pos hx of tobacco smoke       Amount and/or Complexity of Data Reviewed  Labs: ordered.  Radiology: ordered.  ECG/medicine tests: ordered.    Risk  OTC drugs.  Prescription drug management.  Decision regarding hospitalization.      Results for orders placed or performed during the hospital encounter of 08/25/24   COVID-19 and FLU A/B PCR, 1 HR TAT - Swab, Nasopharynx    Specimen: Nasopharynx; Swab   Result Value Ref Range    COVID19 Detected (C) Not Detected - Ref. Range    Influenza A PCR Not Detected Not Detected    Influenza B PCR Not Detected Not Detected   Comprehensive Metabolic Panel    Specimen: Blood   Result Value Ref Range    Glucose 89 65 - 99 mg/dL    BUN 7 6 - 20 mg/dL    Creatinine 0.69 0.57 - 1.00 mg/dL    Sodium 135 (L) 136 - 145 mmol/L    Potassium 3.8 3.5 - 5.2 mmol/L    Chloride 100 98 - 107 mmol/L    CO2 21.3 (L) 22.0 - 29.0 mmol/L    Calcium 9.6 8.6 - 10.5 mg/dL    Total Protein 7.3 6.0 - 8.5 g/dL    Albumin 4.3 3.5 - 5.2 g/dL    ALT (SGPT) 54 (H) 1 - 33 U/L    AST (SGOT) 29 1 - 32 U/L    Alkaline Phosphatase 205 (H) 39 - 117 U/L    Total Bilirubin <0.2 0.0 - 1.2 mg/dL    Globulin 3.0 gm/dL    A/G Ratio 1.4 g/dL    BUN/Creatinine Ratio 10.1 7.0 - 25.0    Anion  Gap 13.7 5.0 - 15.0 mmol/L    eGFR 105.9 >60.0 mL/min/1.73   High Sensitivity Troponin T    Specimen: Blood   Result Value Ref Range    HS Troponin T <6 <14 ng/L   CBC Auto Differential    Specimen: Blood   Result Value Ref Range    WBC 20.45 (H) 3.40 - 10.80 10*3/mm3    RBC 3.67 (L) 3.77 - 5.28 10*6/mm3    Hemoglobin 11.4 (L) 12.0 - 15.9 g/dL    Hematocrit 33.2 (L) 34.0 - 46.6 %    MCV 90.5 79.0 - 97.0 fL    MCH 31.1 26.6 - 33.0 pg    MCHC 34.3 31.5 - 35.7 g/dL    RDW 17.3 (H) 12.3 - 15.4 %    RDW-SD 55.9 (H) 37.0 - 54.0 fl    MPV 9.4 6.0 - 12.0 fL    Platelets 199 140 - 450 10*3/mm3    Neutrophil % 87.7 (H) 42.7 - 76.0 %    Lymphocyte % 2.7 (L) 19.6 - 45.3 %    Monocyte % 7.2 5.0 - 12.0 %    Eosinophil % 0.1 (L) 0.3 - 6.2 %    Basophil % 0.3 0.0 - 1.5 %    Immature Grans % 2.0 (H) 0.0 - 0.5 %    Neutrophils, Absolute 17.92 (H) 1.70 - 7.00 10*3/mm3    Lymphocytes, Absolute 0.56 (L) 0.70 - 3.10 10*3/mm3    Monocytes, Absolute 1.48 (H) 0.10 - 0.90 10*3/mm3    Eosinophils, Absolute 0.03 0.00 - 0.40 10*3/mm3    Basophils, Absolute 0.06 0.00 - 0.20 10*3/mm3    Immature Grans, Absolute 0.40 (H) 0.00 - 0.05 10*3/mm3    nRBC 0.0 0.0 - 0.2 /100 WBC   High Sensitivity Troponin T 2Hr    Specimen: Arm, Right; Blood   Result Value Ref Range    HS Troponin T <6 <14 ng/L    Troponin T Delta     Lactic Acid, Plasma    Specimen: Blood   Result Value Ref Range    Lactate 1.2 0.5 - 2.0 mmol/L   C-reactive Protein    Specimen: Blood   Result Value Ref Range    C-Reactive Protein <0.30 0.00 - 0.50 mg/dL   ECG 12 Lead ED Triage Standing Order; Chest Pain   Result Value Ref Range    QT Interval 278 ms    QTC Interval 402 ms   Green Top (Gel)   Result Value Ref Range    Extra Tube Hold for add-ons.    Lavender Top   Result Value Ref Range    Extra Tube hold for add-on    Gold Top - SST   Result Value Ref Range    Extra Tube Hold for add-ons.    Light Blue Top   Result Value Ref Range    Extra Tube Hold for add-ons.       XR Chest 1 View    Final Result   No acute cardiopulmonary process.           This report was finalized on 8/25/2024 5:22 PM by Alex Pallas, DO.          CT Angiogram Chest Pulmonary Embolism    (Results Pending)        Final diagnoses:   COVID-19       ED Disposition  ED Disposition       ED Disposition   Decision to Admit    Condition   --    Comment   Level of Care: Medical Telemetry [23]   Diagnosis: COVID-19 [[id number]]   Bed Request Comments: Need the CT scan performed prior to admission; the final radiology read does not have to be complete in order to move her upstairs.   Certification: I Certify That Inpatient Hospital Services Are Medically Necessary For Greater Than 2 Midnights                 No follow-up provider specified.       Medication List      No changes were made to your prescriptions during this visit.            Angelika Thompson PA  08/25/24 2007

## 2024-08-26 NOTE — CASE MANAGEMENT/SOCIAL WORK
Discharge Planning Assessment   Rikki     Patient Name: Jie Aragon  MRN: [id number]  Today's Date: 8/25/2024    Admit Date: 8/25/2024    Plan: Pt in isolation, attempt to speak to pt by phone, no answer at this time.   Discharge Needs Assessment    No documentation.                  Discharge Plan       Row Name 08/25/24 2024       Plan    Plan Pt in isolation, attempt to speak to pt by phone, no answer at this time.    Patient/Family in Agreement with Plan unable to assess                  Continued Care and Services - Admitted Since 8/25/2024    No active coordination exists for this encounter.       Expected Discharge Date and Time       Expected Discharge Date Expected Discharge Time    Aug 27, 2024            Demographic Summary       Row Name 08/25/24 2023       General Information    Admission Type inpatient    Arrived From home    Referral Source emergency department    Reason for Consult discharge planning                    Zeinab Gutierrez RN

## 2024-08-26 NOTE — PLAN OF CARE
Goal Outcome Evaluation:  Plan of Care Reviewed With: patient        Progress: improving  Outcome Evaluation: Pt's resting in bed, A&O, stable on RA, ambulated to BR independently, has no c/o, no s/s of distress noted. Had Echo done today. Will con't with POC.

## 2024-08-26 NOTE — PAYOR COMM NOTE
"[facility]  JENNIFER WOO  PHONE [phone]  FAX  [phone]  NPI:  [id number]    REQUEST FOR INPATIENT AUTH    Gabi Beard (49 y.o. Female)       Date of Birth   1975    Social Security Number       Address   [address] [place] [address]    Home Phone   [phone]    MRN   [id number]       Sikhism   None    Marital Status   Single                            Admission Date   24    Admission Type   Emergency    Admitting Provider   Janeth Padilla MD    Attending Provider   Jazmine Woo DO    Department, Room/Bed   [address], 3339/1P       Discharge Date       Discharge Disposition       Discharge Destination                                 Attending Provider: Jazmine Woo DO    Allergies: Amoxicillin, Penicillins    Isolation: Enh Drop/Con   Infection: COVID (confirmed) (24)   Code Status: CPR    Ht: 175.3 cm (69\")   Wt: 67.1 kg (148 lb)    Admission Cmt: None   Principal Problem: COVID-19 [U07.1]                   Active Insurance as of 2024       Primary Coverage       Payor Plan Insurance Group Employer/Plan Group    [employer] [place] [employer] MEDICAID        Payor Plan Address Payor Plan Phone Number Payor Plan Fax Number Effective Dates    [address] [address] [phone]  2024 - None Entered    [facility] [id number]         Subscriber Name Subscriber Birth Date Member ID       GABI BEARD 1975 [id number]                     Emergency Contacts        (Rel.) Home Phone Work Phone Mobile Phone    ADELINA LOPEZ (Significant Other) [phone] -- --    Donna Singh (Relative) -- -- [phone]                 History & Physical        Janeth Padilla MD at 24 1957              [facility] HOSPITALIST HISTORY AND PHYSICAL    Patient Identification:  Name:  Gabi Beard  Age:  49 y.o.  Sex:  female  :  1975  MRN:  [id number]   Visit Number:  [id number]  Admit Date: " 8/25/2024   Room number:  3339/1P  Primary Care Physician:  Adela Gtz, APRN    Date of Admission: 8/25/2024     Subjective     Chief complaint:    Chief Complaint   Patient presents with    Chest Pain     History of presenting illness:  49 y.o. female who was admitted on 8/25/2024 from the ED with fatigue and dyspnea on exertion for one day.  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  She started cycle 3 of chemo, with her last dose being 8/12/2024.  She also had malignancy related pain and chemo induced neuropathy.  The patient states that she woke up on day of admission with severe fatigue and fever of over 101 at home.  She then noticed dyspnea on exertion without any cough.  The patient also denied upper respiratory symptoms.  She did have some chest pain at rest that she thought was pleurisy, as it worsened with deep inspiration and with movement.  She describes the chest pain as sharp, substernal, with radiation to the left jaw; it was also associated with nausea.  She has never had a heart attack before and no one in her family had early onset heart disease.  In the ED, the patient tested positive for COVID-19; she was not hypoxic.  She did have an elevated WBC and heart rate.  Due to her immunocompromised state and SIRS criteria, the patient is being admitted to the medical surgical floor for further evaluation and treatment.  The patient does not recall anyone in particular that she has been around lately that is ill.  ---------------------------------------------------------------------------------------------------------------------   Review of Systems   Constitutional:  Positive for fatigue and fever (Over 101 at home). Negative for appetite change, chills and diaphoresis.   HENT:  Negative for congestion and rhinorrhea.          No loss of taste and smell   Eyes:  Negative for discharge and redness.   Respiratory:  Positive for shortness of breath. Negative for cough.    Cardiovascular:  Positive for chest pain (Radiated to the left jaw and was associated with nausea). Negative for leg swelling.   Gastrointestinal:  Positive for nausea. Negative for diarrhea and vomiting.   Genitourinary:  Negative for dysuria and hematuria.   Musculoskeletal:  Negative for arthralgias and myalgias.   Skin:  Negative for pallor, rash and wound.   Neurological:  Positive for weakness (Generalized) and headaches. Negative for facial asymmetry, speech difficulty and light-headedness.   Psychiatric/Behavioral:  Negative for agitation, behavioral problems and confusion.      ---------------------------------------------------------------------------------------------------------------------   Past Medical History:   Diagnosis Date    Change in vision     Multiple gestation     Ovarian cancer 05/24/2024    Wears dentures     UPPER AND LOWER     Past Surgical History:   Procedure Laterality Date    APPENDECTOMY N/A 5/23/2024    Procedure: APPENDECTOMY;  Surgeon: Shanda Dubois MD;  Location:  [facility];  Service: Gynecology Oncology;  Laterality: N/A;    CYSTOSCOPY Bilateral 5/23/2024    Procedure: CYSTOSCOPY TEMPORARY PLACEMENT BILATERAL URETERAL CATHETER;  Surgeon: Shanda Dubois MD;  Location:  [facility];  Service: Gynecology Oncology;  Laterality: Bilateral;    EXPLORATORY LAPAROTOMY, TOTAL ABDOMINAL HYSTERECTOMY SALPINGO OOPHORECTOMY N/A 5/23/2024    Procedure: EXPLORATORY LAPAROTOMY, TOTAL ABDOMINAL HYSTERECTOMY BILATERAL SALPINGO OOPHORECTOMY, CANCER STAGING/ OPTIMAL DEBULKLING (R=0);  Surgeon: Shanda Dubois MD;  Location:  [facility];  Service: Gynecology Oncology;  Laterality: N/A;    OMENTECTOMY N/A 5/23/2024    Procedure: OMENTECTOMY;  Surgeon: Shanda Dubois MD;  Location:  [facility];  Service: Gynecology Oncology;  Laterality: N/A;     PORTACATH PLACEMENT Right 6/24/2024    Procedure: INSERTION OF PORTACATH;  Surgeon: Yakov Chung MD;  Location: [facility];  Service: General;  Laterality: Right;    TUBAL ABDOMINAL LIGATION       Family History   Problem Relation Age of Onset    Stroke Father     Hypertension Father     Heart attack Father     Deep vein thrombosis Mother     Leukemia Brother     Testicular cancer Son      Social History     Socioeconomic History    Marital status: Single   Tobacco Use    Smoking status: Every Day     Current packs/day: 1.00     Average packs/day: 1 pack/day for 15.0 years (15.0 ttl pk-yrs)     Types: Cigarettes     Passive exposure: Current    Smokeless tobacco: Never   Vaping Use    Vaping status: Never Used   Substance and Sexual Activity    Alcohol use: Never    Drug use: Never    Sexual activity: Yes     Partners: Male     Birth control/protection: I.U.D., Tubal ligation     ---------------------------------------------------------------------------------------------------------------------   Allergies:  Amoxicillin and Penicillins  ---------------------------------------------------------------------------------------------------------------------   Medications below are reported home medications pulling from within the system; at this time, these medications have not been reconciled unless otherwise specified and are in the verification process for further verification as current home medications.      Prior to Admission Medications       Prescriptions Last Dose Informant Patient Reported? Taking?    acetaminophen (TYLENOL) 325 MG tablet   No No    Take 2 tablets by mouth Every 6 Hours.    docusate sodium (COLACE) 100 MG capsule   No No    Take 2 capsules by mouth 2 (Two) Times a Day As Needed for Constipation. Two capusles    gabapentin (NEURONTIN) 100 MG capsule   No No    Take 1 capsule by mouth At Night As Needed (neuropathy).    granisetron (SANCUSO) 3.1 MG/24HR   No No    Place 1 patch on the  skin as directed by provider 1 (One) Time Per Week.    ibuprofen (ADVIL,MOTRIN) 800 MG tablet   No No    Take 1 tablet by mouth Every 8 (Eight) Hours As Needed for Mild Pain.    lidocaine-prilocaine (EMLA) 2.5-2.5 % cream   No No    Apply to port-a-cath site 30 minutes prior to arrival at infusion center. Cover with plastic wrap.    loratadine (CLARITIN) 10 MG tablet   No No    Take 1 tablet by mouth weekly starting the day of growth factor injection.    naloxone (NARCAN) 4 MG/0.1ML nasal spray   No No    Call 911. Don't prime. [place] in 1 nostril for overdose. Repeat in 2-3 minutes in other nostril if no or minimal breathing/responsiveness.    nicotine (Nicotine Step 1) 21 MG/24HR patch   No No    Place 1 patch on the skin as directed by provider Daily.    nicotine (Nicotine Step 2) 14 MG/24HR patch   No No    Place 1 patch on the skin as directed by provider Daily.    nicotine (Nicotine Step 3) 7 MG/24HR patch   No No    Place 1 patch on the skin as directed by provider Daily.    OLANZapine (ZyPREXA) 5 MG tablet   No No    Take 1 tablet by mouth Every Night. Take nightly x 4 starting night of chemotherapy.    ondansetron (ZOFRAN) 8 MG tablet   No No    Take 1 tablet by mouth 3 (Three) Times a Day As Needed for Nausea or Vomiting.    oxyCODONE (OXY-IR) 5 MG capsule   Yes No    Take 1 capsule by mouth As Needed for Moderate Pain.    polyethylene glycol (MIRALAX) 17 GM/SCOOP powder   No No    Take 17 g by mouth Daily.    prochlorperazine (COMPAZINE) 10 MG tablet   No No    Take 1 tablet by mouth Every 6 (Six) Hours As Needed for Nausea or Vomiting.    pyridoxine (VITAMIN B-6) 50 MG tablet tablet   No No    Take 1 tablet by mouth Daily.    rivaroxaban (Xarelto) 10 MG tablet   No No    Take 1 tablet by mouth Daily.    sertraline (Zoloft) 50 MG tablet   No No    Take 1 tablet by mouth Daily.          Objective     Vital Signs:  Temp:  [98 °F (36.7 °C)] 98 °F (36.7 °C)  Heart Rate:  [] 77  Resp:  [16-20] 20  BP:  ()/(59-79) 104/59    Mean Arterial Pressure (Non-Invasive) for the past 24 hrs (Last 3 readings):   Noninvasive MAP (mmHg)   08/25/24 2048 79   08/25/24 2026 77   08/25/24 1956 68     SpO2:  [96 %-100 %] 97 %  on   ;   Device (Oxygen Therapy): room air  Body mass index is 21.86 kg/m².    Wt Readings from Last 3 Encounters:   08/25/24 67.1 kg (148 lb)   08/12/24 67.4 kg (148 lb 9.6 oz)   08/12/24 67.4 kg (148 lb 9.6 oz)      ----------------------------------------------------------------------------------------------------------------------  Physical Exam  Vitals and nursing note reviewed. Exam conducted with a chaperone present.   Constitutional:       General: She is awake. She is in acute distress.      Appearance: She is well-developed. She is not toxic-appearing or diaphoretic.      Comments: On room air.   HENT:      Head: Normocephalic and atraumatic.      Right Ear: External ear normal.      Left Ear: External ear normal.      Nose: Nose normal.   Eyes:      General: No scleral icterus.        Right eye: No discharge.         Left eye: No discharge.      Extraocular Movements: Extraocular movements intact.      Conjunctiva/sclera: Conjunctivae normal.      Pupils: Pupils are equal, round, and reactive to light.   Cardiovascular:      Rate and Rhythm: Normal rate and regular rhythm.      Pulses: Normal pulses.      Heart sounds: No murmur heard.  Pulmonary:      Effort: Pulmonary effort is normal. Respiratory distress present. No accessory muscle usage or prolonged expiration.      Breath sounds: No stridor or decreased air movement. No wheezing or rales.   Abdominal:      General: Bowel sounds are normal. There is no distension.      Palpations: Abdomen is soft.   Musculoskeletal:         General: No swelling or deformity.   Skin:     Capillary Refill: Capillary refill takes less than 2 seconds.      Coloration: Skin is not jaundiced or pale.   Neurological:      Mental Status: She is alert and  oriented to person, place, and time. Mental status is at baseline.      Cranial Nerves: No cranial nerve deficit.   Psychiatric:         Mood and Affect: Mood normal.         Behavior: Behavior normal. Behavior is cooperative.         Thought Content: Thought content normal.         Judgment: Judgment normal.       ---------------------------------------------------------------------------------------------------------------------  EKG:  Sinus tachycardia with heart rate 124, QTc 402 ms, inverted T waves and mild ST depressions in the inferior and lateral leads.  When compared to an EKG dated 5/26/2024, the inverted T waves and the ST depressions are new findings.  Please note that I have personally looked at the EKG from this admission, the comparison EKG in the medical records, and the above is my interpretation of this admission's EKG; I await the final cardiology read.      Telemetry:  NS with heart rates 70-80s.  Please note that I personally looked at the telemetry strips.    --------------------------------------------------------------------------------------------------------------------  LABS:    CBC and coagulation:  Results from last 7 days   Lab Units 08/26/24  0143 08/25/24  1615 08/25/24  1531   LACTATE mmol/L  --  1.2  --    CRP mg/dL  --   --  <0.30   WBC 10*3/mm3 14.11*  --  20.45*   HEMOGLOBIN g/dL 10.2*  --  11.4*   HEMATOCRIT % 31.2*  --  33.2*   MCV fL 95.4  --  90.5   MCHC g/dL 32.7  --  34.3   PLATELETS 10*3/mm3 165  --  199     Renal and electrolytes:  Results from last 7 days   Lab Units 08/26/24  0143 08/25/24  1734 08/25/24  1531   SODIUM mmol/L 138  --  135*   POTASSIUM mmol/L 4.3  --  3.8   MAGNESIUM mg/dL 2.2 1.6  --    CHLORIDE mmol/L 106  --  100   CO2 mmol/L 19.3*  --  21.3*   BUN mg/dL 8  --  7   CREATININE mg/dL 0.56*  --  0.69   CALCIUM mg/dL 9.1  --  9.6   PHOSPHORUS mg/dL 3.7  --   --    GLUCOSE mg/dL 177*  --  89   ANION GAP mmol/L 12.7  --  13.7     Estimated Creatinine  Clearance: 127.3 mL/min (A) (by C-G formula based on SCr of 0.56 mg/dL (L)).    Liver and pancreatic function:  Results from last 7 days   Lab Units 08/26/24  0143 08/25/24  1531   ALBUMIN g/dL 3.9 4.3   BILIRUBIN mg/dL <0.2 <0.2   ALK PHOS U/L 176* 205*   AST (SGOT) U/L 28 29   ALT (SGPT) U/L 48* 54*     Endocrine function:  Lab Results   Component Value Date    HGBA1C 5.90 (H) 08/25/2024     Lab Results   Component Value Date    TSH 3.860 05/26/2024     Cardiac:  Results from last 7 days   Lab Units 08/26/24  0143 08/25/24  1734 08/25/24  1531   CK TOTAL U/L  --  35  --    HSTROP T ng/L <6 <6 <6     Results from last 7 days   Lab Units 08/26/24  0143   CHOLESTEROL mg/dL 236*   TRIGLYCERIDES mg/dL 73   HDL CHOL mg/dL 40   LDL CHOL mg/dL 183*     Cultures:  Microbiology Results (last 10 days)       Procedure Component Value - Date/Time    COVID-19 and FLU A/B PCR, 1 HR TAT - Swab, Nasopharynx [[id number]]  (Abnormal) Collected: 08/25/24 1647    Lab Status: Final result Specimen: Swab from Nasopharynx Updated: 08/25/24 1714     COVID19 Detected     Influenza A PCR Not Detected     Influenza B PCR Not Detected    Narrative:      Fact sheet for providers: https://www.fda.gov/media/576937/download    Fact sheet for patients: https://www.fda.gov/media/568556/download    Test performed by PCR.  Influenza A and Influenza B negative results should be considered presumptive in samples that have a positive SARS-CoV-2 result.    Competitive inhibition studies showed that SARS-CoV-2 virus, when present at concentrations above 3.6E+04 copies/mL, can inhibit the detection and amplification of influenza A and influenza B virus RNA if present at or below 1.8E+02 copies/mL or 4.9E+02 copies/mL, respectively, and may lead to false negative influenza virus results. If co-infection with influenza A or influenza B virus is suspected in samples with a positive SARS-CoV-2 result, the sample should be re-tested with another FDA cleared,  approved, or authorized influenza test, if influenza virus detection would change clinical management.          I have personally looked at the labs and they are summarized above.  ----------------------------------------------------------------------------------------------------------------------  Detailed radiology reports for the last 24 hours:    Imaging Results (Last 24 Hours)       Procedure Component Value Units Date/Time    CT Angiogram Chest Pulmonary Embolism [[id number]] Collected: 08/25/24 2140     Updated: 08/25/24 2146    Narrative:      PROCEDURE: CT angiography of the chest performed with IV contrast on  August 25, 2024. The examination was performed with 1 and 2 mm axial  imaging. The examination was performed according to as low as reasonably  achievable dose protocol. Total . The patient was administered 70  cc of Isovue-370 contrast IV without complication. 3D surface display  images were performed and projected in multiple planes. Sagittal and  coronal reconstruction images performed.     HISTORY: Chest pain. Shortness of breath. COVID-positive.     COMPARISON: None.     FINDINGS:     Infusion port noted in the upper anterior right chest wall with catheter  tip to the SVC.  Normal heart size with no pericardial effusion.  No thoracic aortic aneurysm or dissection.  Normal pulmonary embolus.  Small bilateral apical subpleural cysts.  No bronchial inflammation.   No interstitial edema.   No pleural effusion.   No pneumothorax.  Normal thyroid gland.       Impression:      Impression:     1.  Normal heart size.  2.  No thoracic aortic aneurysm or dissection.  3.  No pulmonary embolus.  4.  No bronchial inflammation.  5.  No mediastinal or hilar adenopathy.  6.  No features to suggest multifocal pneumonia.    This report was finalized on 8/25/2024 9:44 PM by Manuel Nino MD.       XR Chest 1 View [[id number]] Collected: 08/25/24 1721     Updated: 08/25/24 1724    Narrative:      INDICATION:  Chest pain.     TECHNIQUE: Frontal radiograph of the chest.     COMPARISON: 6/24/2024.     FINDINGS:   The cardiomediastinal silhouette and pulmonary vasculature appear within  normal limits. No infiltrate, pleural effusion or pneumothorax. No acute  osseous abnormality evident. Right-sided chest port catheter tip in the  superior vena cava.       Impression:      No acute cardiopulmonary process.     This report was finalized on 8/25/2024 5:22 PM by Alex Pallas, DO.           I have personally looked at the radiology images and I have read the available final reports.    Assessment & Plan       -Sepsis per CMS criteria, present on admission (WBC 20,450, heart rates 101-120), due to an acute COVID-19  -New inferior and lateral EKGs changes, present on admission  -History of stage II bilateral ovarian cancer, status post hysterectomy, salpingo-oophorectomy, and chemotherapy (Carboplatin/Taxol, on cycle 3 and the last dose was 8/12/2024)  -History of malignancy associated pain  -History of chemotherapy induced neuropathy  -Smoking use disorder    Admitted to the medical surgical floor.  Will place in COVID-19 isolation.  Will give empiric Paxlovid and Decadron, as the patient is high risk due to the recent use of chemotherapy.  Will also give her aspirin, obtain an ECHO, and obtain serial troponin levels and EKGs.  Will hold off on a statin for now as the liver enzymes are mildly elevated and she is at risk of more elevation due to the COVID-19.  Will repeat labs in the am.  Will give a nicotine patch for the smoking addiction.  Her blood pressures are low normal range and thus we will monitor them closely.    VTE Prophylaxis:  DVT dose of subcutaneous heparin    The patient is high risk due to the following diagnoses/reasons:  Sepsis that was present on admission    Janeth Padilla MD  [facility]  08/26/24  03:55 EDT        Electronically signed by Janeth Padilla MD at 08/26/24 0400           Emergency Department Notes        Angelika Thompson PA at 08/25/24 2056          Subjective   History of Present Illness  Chest pain, soa today   Patient reports that she started having misternal nonradiating chest pain that started one hour prior to arrival. Patien reports that she also feels like she has had increased shortness of breath.  Pt has hx of Ovarian cancer and is currently doing chemo last treatment was 10 days ago   Pt states that she tested pos for covid this am     History provided by:  Patient   used: No    Shortness of Breath  Severity:  Moderate  Onset quality:  Sudden  Timing:  Constant  Progression:  Worsening  Chronicity:  New  Context: activity and URI    Relieved by:  None tried  Worsened by:  Exertion  Ineffective treatments:  None tried  Associated symptoms: cough    Risk factors: hx of cancer        Review of Systems   Respiratory:  Positive for cough and shortness of breath.        Past Medical History:   Diagnosis Date    Change in vision     Multiple gestation     Ovarian cancer 05/24/2024    Wears dentures     UPPER AND LOWER       Allergies   Allergen Reactions    Amoxicillin Other (See Comments)     unknown    Penicillins Other (See Comments)     unknown       Past Surgical History:   Procedure Laterality Date    APPENDECTOMY N/A 5/23/2024    Procedure: APPENDECTOMY;  Surgeon: Shanda Dubois MD;  Location:  [facility];  Service: Gynecology Oncology;  Laterality: N/A;    CYSTOSCOPY Bilateral 5/23/2024    Procedure: CYSTOSCOPY TEMPORARY PLACEMENT BILATERAL URETERAL CATHETER;  Surgeon: Shanda Dubois MD;  Location: [facility];  Service: Gynecology Oncology;  Laterality: Bilateral;    EXPLORATORY LAPAROTOMY, TOTAL ABDOMINAL HYSTERECTOMY SALPINGO OOPHORECTOMY N/A 5/23/2024    Procedure: EXPLORATORY LAPAROTOMY, TOTAL ABDOMINAL HYSTERECTOMY BILATERAL SALPINGO OOPHORECTOMY, CANCER STAGING/ OPTIMAL DEBULKLING (R=0);  Surgeon: Shanda Dubois MD;  Location:   [facility];  Service: Gynecology Oncology;  Laterality: N/A;    OMENTECTOMY N/A 5/23/2024    Procedure: OMENTECTOMY;  Surgeon: Shanda Dubois MD;  Location:  [facility];  Service: Gynecology Oncology;  Laterality: N/A;    PORTACATH PLACEMENT Right 6/24/2024    Procedure: INSERTION OF PORTACATH;  Surgeon: Yakov Chung MD;  Location:  [facility];  Service: General;  Laterality: Right;    TUBAL ABDOMINAL LIGATION         Family History   Problem Relation Age of Onset    Stroke Father     Hypertension Father     Heart attack Father     Deep vein thrombosis Mother     Leukemia Brother     Testicular cancer Son        Social History     Socioeconomic History    Marital status: Single   Tobacco Use    Smoking status: Every Day     Current packs/day: 1.00     Average packs/day: 1 pack/day for 15.0 years (15.0 ttl pk-yrs)     Types: Cigarettes     Passive exposure: Current    Smokeless tobacco: Never   Vaping Use    Vaping status: Never Used   Substance and Sexual Activity    Alcohol use: Never    Drug use: Never    Sexual activity: Yes     Partners: Male     Birth control/protection: I.U.D., Tubal ligation           Objective   Physical Exam  Vitals and nursing note reviewed.   Constitutional:       Appearance: She is well-developed.   HENT:      Head: Normocephalic.   Cardiovascular:      Rate and Rhythm: Regular rhythm. Tachycardia present.   Pulmonary:      Effort: Pulmonary effort is normal.      Breath sounds: Normal breath sounds.   Abdominal:      General: Bowel sounds are normal.      Palpations: Abdomen is soft.      Tenderness: There is no abdominal tenderness.   Musculoskeletal:         General: Normal range of motion.      Cervical back: Neck supple.   Skin:     General: Skin is warm and dry.   Neurological:      Mental Status: She is alert and oriented to person, place, and time.   Psychiatric:         Behavior: Behavior normal.         Thought Content: Thought content normal.         Judgment: Judgment  normal.         Procedures          ED Course  ED Course as of 08/25/24 2103   Sun Aug 25, 2024   1522 EKG interpretation: Sinus tachycardia 126 bpm QTc 402 no ST elevations or depressions.  Electronically signed by Vaughn Paris DO, 08/25/24, 3:23 PM EDT.   [GF]   1944 Pt was discussed with Dr Padilla will admit  [LC]      ED Course User Index  [GF] Vaguhn Paris DO  [LC] Angelika Thompson PA                                             Medical Decision Making  Chest pain, soa today   Patient reports that she started having misternal nonradiating chest pain that started one hour prior to arrival. Patien reports that she also feels like she has had increased shortness of breath.  Pt has hx of Ovarian cancer and is currently doing chemo last treatment was 10 days ago   Pt states that she tested pos for covid this am   Pos hx of tobacco smoke       Amount and/or Complexity of Data Reviewed  Labs: ordered.  Radiology: ordered.  ECG/medicine tests: ordered.    Risk  OTC drugs.  Prescription drug management.  Decision regarding hospitalization.      Results for orders placed or performed during the hospital encounter of 08/25/24   COVID-19 and FLU A/B PCR, 1 HR TAT - Swab, Nasopharynx    Specimen: Nasopharynx; Swab   Result Value Ref Range    COVID19 Detected (C) Not Detected - Ref. Range    Influenza A PCR Not Detected Not Detected    Influenza B PCR Not Detected Not Detected   Comprehensive Metabolic Panel    Specimen: Blood   Result Value Ref Range    Glucose 89 65 - 99 mg/dL    BUN 7 6 - 20 mg/dL    Creatinine 0.69 0.57 - 1.00 mg/dL    Sodium 135 (L) 136 - 145 mmol/L    Potassium 3.8 3.5 - 5.2 mmol/L    Chloride 100 98 - 107 mmol/L    CO2 21.3 (L) 22.0 - 29.0 mmol/L    Calcium 9.6 8.6 - 10.5 mg/dL    Total Protein 7.3 6.0 - 8.5 g/dL    Albumin 4.3 3.5 - 5.2 g/dL    ALT (SGPT) 54 (H) 1 - 33 U/L    AST (SGOT) 29 1 - 32 U/L    Alkaline Phosphatase 205 (H) 39 - 117 U/L    Total Bilirubin <0.2 0.0 - 1.2 mg/dL     Globulin 3.0 gm/dL    A/G Ratio 1.4 g/dL    BUN/Creatinine Ratio 10.1 7.0 - 25.0    Anion Gap 13.7 5.0 - 15.0 mmol/L    eGFR 105.9 >60.0 mL/min/1.73   High Sensitivity Troponin T    Specimen: Blood   Result Value Ref Range    HS Troponin T <6 <14 ng/L   CBC Auto Differential    Specimen: Blood   Result Value Ref Range    WBC 20.45 (H) 3.40 - 10.80 10*3/mm3    RBC 3.67 (L) 3.77 - 5.28 10*6/mm3    Hemoglobin 11.4 (L) 12.0 - 15.9 g/dL    Hematocrit 33.2 (L) 34.0 - 46.6 %    MCV 90.5 79.0 - 97.0 fL    MCH 31.1 26.6 - 33.0 pg    MCHC 34.3 31.5 - 35.7 g/dL    RDW 17.3 (H) 12.3 - 15.4 %    RDW-SD 55.9 (H) 37.0 - 54.0 fl    MPV 9.4 6.0 - 12.0 fL    Platelets 199 140 - 450 10*3/mm3    Neutrophil % 87.7 (H) 42.7 - 76.0 %    Lymphocyte % 2.7 (L) 19.6 - 45.3 %    Monocyte % 7.2 5.0 - 12.0 %    Eosinophil % 0.1 (L) 0.3 - 6.2 %    Basophil % 0.3 0.0 - 1.5 %    Immature Grans % 2.0 (H) 0.0 - 0.5 %    Neutrophils, Absolute 17.92 (H) 1.70 - 7.00 10*3/mm3    Lymphocytes, Absolute 0.56 (L) 0.70 - 3.10 10*3/mm3    Monocytes, Absolute 1.48 (H) 0.10 - 0.90 10*3/mm3    Eosinophils, Absolute 0.03 0.00 - 0.40 10*3/mm3    Basophils, Absolute 0.06 0.00 - 0.20 10*3/mm3    Immature Grans, Absolute 0.40 (H) 0.00 - 0.05 10*3/mm3    nRBC 0.0 0.0 - 0.2 /100 WBC   High Sensitivity Troponin T 2Hr    Specimen: Arm, Right; Blood   Result Value Ref Range    HS Troponin T <6 <14 ng/L    Troponin T Delta     Lactic Acid, Plasma    Specimen: Blood   Result Value Ref Range    Lactate 1.2 0.5 - 2.0 mmol/L   C-reactive Protein    Specimen: Blood   Result Value Ref Range    C-Reactive Protein <0.30 0.00 - 0.50 mg/dL   ECG 12 Lead ED Triage Standing Order; Chest Pain   Result Value Ref Range    QT Interval 278 ms    QTC Interval 402 ms   Green Top (Gel)   Result Value Ref Range    Extra Tube Hold for add-ons.    Lavender Top   Result Value Ref Range    Extra Tube hold for add-on    Gold Top - SST   Result Value Ref Range    Extra Tube Hold for add-ons.    Light  Blue Top   Result Value Ref Range    Extra Tube Hold for add-ons.       XR Chest 1 View   Final Result   No acute cardiopulmonary process.           This report was finalized on 8/25/2024 5:22 PM by Alex Pallas, DO.          CT Angiogram Chest Pulmonary Embolism    (Results Pending)        Final diagnoses:   COVID-19       ED Disposition  ED Disposition       ED Disposition   Decision to Admit    Condition   --    Comment   Level of Care: Medical Telemetry [23]   Diagnosis: COVID-19 [[id number]]   Bed Request Comments: Need the CT scan performed prior to admission; the final radiology read does not have to be complete in order to move her upstairs.   Certification: I Certify That Inpatient Hospital Services Are Medically Necessary For Greater Than 2 Midnights                 No follow-up provider specified.       Medication List      No changes were made to your prescriptions during this visit.            Angelika Thompson PA  08/25/24 2103      Electronically signed by Angelika Thompson PA at 08/25/24 2103       Current Facility-Administered Medications   Medication Dose Route Frequency Provider Last Rate Last Admin    acetaminophen (TYLENOL) tablet 650 mg  650 mg Oral Q4H PRN Janeth Padilla MD        Or    acetaminophen (TYLENOL) suppository 650 mg  650 mg Rectal Q4H PRN Janeth Padilla MD        aspirin EC tablet 81 mg  81 mg Oral Daily Janeth Padilla MD        benzonatate (TESSALON) capsule 100 mg  100 mg Oral TID PRN Janeth Padilla MD        sennosides-docusate (PERICOLACE) 8.6-50 MG per tablet 2 tablet  2 tablet Oral BID PRN Janeth Padilla MD        And    polyethylene glycol (MIRALAX) packet 17 g  17 g Oral Daily PRN Janeth Padilla MD        And    bisacodyl (DULCOLAX) EC tablet 5 mg  5 mg Oral Daily PRN Janeth Padilla MD        And    bisacodyl (DULCOLAX) suppository 10 mg  10 mg Rectal Daily PRN Janeth Padilla MD        dexAMETHasone (DECADRON) tablet 6 mg  6 mg Oral Daily With  Breakfast Janeth Padilla MD        dextrose (D50W) (25 g/50 mL) IV injection 25 g  25 g Intravenous Q15 Min PRN Janeth Padilla MD        dextrose (GLUTOSE) oral gel 15 g  15 g Oral Q15 Min PRN Janeth Padilla MD        gabapentin (NEURONTIN) capsule 100 mg  100 mg Oral Nightly PRN Janeth Padilla MD        glucagon HCl (Diagnostic) injection 1 mg  1 mg Subcutaneous Q15 Min PRN Janeth Padilla MD        guaiFENesin-dextromethorphan (ROBITUSSIN DM) 100-10 MG/5ML syrup 20 mL  20 mL Oral Q4H PRN Janeth Padilla MD        heparin (porcine) 5000 UNIT/ML injection 5,000 Units  5,000 Units Subcutaneous Q12H Janeth Padilla MD   5,000 Units at 08/25/24 2354    ibuprofen (ADVIL,MOTRIN) tablet 800 mg  800 mg Oral Q8H PRN Janeth Padilla MD        nicotine (NICODERM CQ) 21 MG/24HR patch 1 patch  1 patch Transdermal Q24H Janeth Padilla MD        nirmatrelvir and ritonavir (300mg/100mg)(PAXLOVID) 3 tablet pack  3 tablet Oral BID Janeth Padilla MD   3 tablet at 08/26/24 0006    nitroglycerin (NITROSTAT) SL tablet 0.4 mg  0.4 mg Sublingual Q5 Min PRN Janeth Padilla MD        prochlorperazine (COMPAZINE) tablet 10 mg  10 mg Oral Q6H PRN Janeth Padilla MD        sertraline (ZOLOFT) tablet 50 mg  50 mg Oral Daily Janeth Padilla MD        sodium chloride 0.9 % flush 10 mL  10 mL Intravenous PRN Janeth Padilla MD        sodium chloride 0.9 % flush 10 mL  10 mL Intravenous Q12H Janeth Padilla MD   10 mL at 08/25/24 2355    sodium chloride 0.9 % flush 10 mL  10 mL Intravenous PRN Janeth Padilla MD        sodium chloride 0.9 % infusion 40 mL  40 mL Intravenous PRN Janeth Padilla MD        vitamin B-6 (PYRIDOXINE) tablet 50 mg  50 mg Oral Daily Janeth Padilla MD         Orders (last 24 hrs)        Start     Ordered    08/27/24 0600  Document Pulse Oximetry - On Room Air / Home O2 Level  Daily      Comments: Room Air Oxygen Levels Should Only Be Measured if Patient Oxygen  Needs are <4L  Home O2 Oxygen Levels Should Only Be Measured Once Patient Within 2L of Home O2 Baseline  Reapply Oxygen if O2 Sat Drops Below 88%    08/25/24 2307    08/26/24 0900  sertraline (ZOLOFT) tablet 50 mg  Daily         08/26/24 0520    08/26/24 0900  vitamin B-6 (PYRIDOXINE) tablet 50 mg  Daily         08/26/24 0520    08/26/24 0900  aspirin EC tablet 81 mg  Daily         08/25/24 2332 08/26/24 0900  nicotine (NICODERM CQ) 21 MG/24HR patch 1 patch  Every 24 Hours Scheduled         08/26/24 0357    08/26/24 0800  Oral Care  2 Times Daily       08/25/24 2210 08/26/24 0800  dexAMETHasone (DECADRON) tablet 6 mg  Daily With Breakfast         08/25/24 2307 08/26/24 0600  Comprehensive Metabolic Panel  Daily       08/25/24 2307 08/26/24 0600  CBC & Differential  Daily       08/25/24 2307 08/26/24 0600  Comprehensive Metabolic Panel  Daily,   Status:  Canceled       08/25/24 2307 08/26/24 0600  Magnesium  Daily       08/25/24 2307 08/26/24 0600  Phosphorus  Daily       08/25/24 2307 08/26/24 0600  CBC Auto Differential  PROCEDURE ONCE         08/25/24 2307 08/26/24 0600  High Sensitivity Troponin T  Morning Draw         08/25/24 2330 08/26/24 0600  Lipid Panel  Morning Draw         08/25/24 2332 08/26/24 0519  prochlorperazine (COMPAZINE) tablet 10 mg  Every 6 Hours PRN         08/26/24 0520    08/26/24 0519  gabapentin (NEURONTIN) capsule 100 mg  Nightly PRN         08/26/24 0520    08/26/24 0519  ibuprofen (ADVIL,MOTRIN) tablet 800 mg  Every 8 Hours PRN         08/26/24 0520    08/26/24 0500  ECG 12 Lead Chest Pain  Tomorrow AM         08/26/24 0400    08/26/24 0015  heparin (porcine) 5000 UNIT/ML injection 5,000 Units  Every 12 Hours Scheduled         08/25/24 2324    08/26/24 0000  Vital Signs  Every 6 Hours         08/25/24 2210 08/26/24 0000  Intake & Output  Every 6 Hours         08/25/24 2210    08/26/24 0000  nirmatrelvir and ritonavir (300mg/100mg)(PAXLOVID) 3 tablet  "pack  2 Times Daily        Note to Pharmacy: Not authorized for use in patients younger than 12 years.    08/25/24 2307 08/25/24 2334  Adult Transthoracic Echo Complete W/ Cont if Necessary Per Protocol  Once         08/25/24 2333 08/25/24 2313  Magnesium  Once         08/25/24 2312 08/25/24 2313  Hemoglobin A1c  Once         08/25/24 2312 08/25/24 2312  dextrose (GLUTOSE) oral gel 15 g  Every 15 Minutes PRN         08/25/24 2312 08/25/24 2312  dextrose (D50W) (25 g/50 mL) IV injection 25 g  Every 15 Minutes PRN         08/25/24 2312 08/25/24 2312  glucagon HCl (Diagnostic) injection 1 mg  Every 15 Minutes PRN         08/25/24 2312 08/25/24 2305  guaiFENesin-dextromethorphan (ROBITUSSIN DM) 100-10 MG/5ML syrup 20 mL  Every 4 Hours PRN         08/25/24 2307 08/25/24 2305  acetaminophen (TYLENOL) tablet 650 mg  Every 4 Hours PRN        Placed in \"Or\" Linked Group    08/25/24 2307 08/25/24 2305  acetaminophen (TYLENOL) suppository 650 mg  Every 4 Hours PRN        Placed in \"Or\" Linked Group    08/25/24 2307 08/25/24 2305  benzonatate (TESSALON) capsule 100 mg  3 Times Daily PRN         08/25/24 2307 08/25/24 2305  Notify Provider (Specify Parameters)  Continuous        Comments: Open Order Report to View Parameters Requiring Provider Notification    08/25/24 2307 08/25/24 2305  VTE Prophylaxis Not Indicated: Contraindicated; Other  Once,   Status:  Canceled        Comments: Already ordered    08/25/24 2307 08/25/24 2305  Lactate Dehydrogenase  Once         08/25/24 2304 08/25/24 2304  Ferritin  Once         08/25/24 2304 08/25/24 2304  CK  Once         08/25/24 2304 08/25/24 2300  sodium chloride 0.9 % flush 10 mL  Every 12 Hours Scheduled         08/25/24 2210 08/25/24 2300  heparin (porcine) 5000 UNIT/ML injection 5,000 Units  Every 12 Hours Scheduled,   Status:  Discontinued         08/25/24 2210 08/25/24 2211  Weigh Patient  Once         08/25/24 2210    " "08/25/24 2211  Notify PCP of Patient Admission  Once         08/25/24 2210 08/25/24 2211  Insert Peripheral IV  Once         08/25/24 2210 08/25/24 2211  Saline Lock & Maintain IV Access  Continuous,   Status:  Canceled         08/25/24 2210 08/25/24 2211  Continuous Cardiac Monitoring  Continuous        Comments: Follow Standing Orders As Outlined in Process Instructions (Open Order Report to View Full Instructions)    08/25/24 2210 08/25/24 2211  Maintain IV Access  Continuous         08/25/24 2210 08/25/24 2211  Telemetry - Place Orders & Notify Provider of Results When Patient Experiences Acute Chest Pain, Dysrhythmia or Respiratory Distress  Continuous        Comments: Open Order Report to View Parameters Requiring Provider Notification    08/25/24 2210 08/25/24 2211  May Be Off Telemetry for Tests  Continuous         08/25/24 2210 08/25/24 2211  Continuous Pulse Oximetry  Continuous         08/25/24 2210 08/25/24 2211  Activity - Bed Rest With Exceptions (Specify)  Until Discontinued         08/25/24 2210 08/25/24 2211  Daily Weights  Daily       08/25/24 2210 08/25/24 2211  Diet: Regular/House; Fluid Consistency: Thin (IDDSI 0)  Diet Effective Now         08/25/24 2210 08/25/24 2210  sodium chloride 0.9 % flush 10 mL  As Needed         08/25/24 2210 08/25/24 2210  sodium chloride 0.9 % infusion 40 mL  As Needed         08/25/24 2210 08/25/24 2210  nitroglycerin (NITROSTAT) SL tablet 0.4 mg  Every 5 Minutes PRN         08/25/24 2210 08/25/24 2210  sennosides-docusate (PERICOLACE) 8.6-50 MG per tablet 2 tablet  2 Times Daily PRN        Placed in \"And\" Linked Group    08/25/24 2210 08/25/24 2210  polyethylene glycol (MIRALAX) packet 17 g  Daily PRN        Placed in \"And\" Linked Group    08/25/24 2210 08/25/24 2210  bisacodyl (DULCOLAX) EC tablet 5 mg  Daily PRN        Placed in \"And\" Linked Group    08/25/24 2210 08/25/24 2210  bisacodyl (DULCOLAX) suppository " "10 mg  Daily PRN        Placed in \"And\" Linked Group    08/25/24 2210 08/25/24 2128  iopamidol (ISOVUE-370) 76 % injection 100 mL  Once in Imaging         08/25/24 2112 08/25/24 1959  Code Status and Medical Interventions: CPR (Attempt to Resuscitate); Full Support  Continuous         08/25/24 1959 08/25/24 1955  Inpatient Admission  Once         08/25/24 1956 08/25/24 1901  CT Angiogram Chest Pulmonary Embolism  1 Time Imaging         08/25/24 1900 08/25/24 1731  High Sensitivity Troponin T 2Hr  PROCEDURE ONCE         08/25/24 1555    08/25/24 1616  methylPREDNISolone sodium succinate (SOLU-Medrol) injection 125 mg  Once         08/25/24 1600    08/25/24 1615  ipratropium-albuterol (DUO-NEB) nebulizer solution 3 mL  Once         08/25/24 1559    08/25/24 1614  sepsis fluid NS 0.9 % bolus 2,022 mL  Once         08/25/24 1558    08/25/24 1607  C-reactive Protein  Once         08/25/24 1606    08/25/24 1600  C-reactive Protein  Once,   Status:  Canceled         08/25/24 1559    08/25/24 1600  Lactic Acid, Plasma  Once         08/25/24 1559    08/25/24 1600  Blood Culture - Blood, Arm, Left  Once         08/25/24 1559    08/25/24 1600  Blood Culture - Blood, Arm, Right  Once         08/25/24 1559    08/25/24 1559  COVID-19 and FLU A/B PCR, 1 HR TAT - Swab, Nasopharynx  Once         08/25/24 1558    08/25/24 1524  aspirin chewable tablet 324 mg  Once         08/25/24 1508    08/25/24 1509  NPO Diet NPO Type: Strict NPO  Diet Effective Now,   Status:  Canceled         08/25/24 1508    08/25/24 1509  Undress and Gown  Once         08/25/24 1508    08/25/24 1509  Cardiac Monitoring  Continuous,   Status:  Canceled        Comments: Follow Standing Orders As Outlined in Process Instructions (Open Order Report to View Full Instructions)    08/25/24 1508    08/25/24 1509  Continuous Pulse Oximetry  Continuous,   Status:  Canceled         08/25/24 1508    08/25/24 1509  ECG 12 Lead ED Triage Standing Order; Chest " Pain  Once         08/25/24 1508    08/25/24 1509  Insert Peripheral IV  Once         08/25/24 1508    08/25/24 1509  [place] Draw  Once         08/25/24 1508    08/25/24 1509  CBC & Differential  Once         08/25/24 1508    08/25/24 1509  Comprehensive Metabolic Panel  Once         08/25/24 1508    08/25/24 1509  High Sensitivity Troponin T  Once         08/25/24 1508    08/25/24 1509  XR Chest 1 View  1 Time Imaging         08/25/24 1508    08/25/24 1509  Green Top (Gel)  PROCEDURE ONCE         08/25/24 1508    08/25/24 1509  Lavender Top  PROCEDURE ONCE         08/25/24 1508    08/25/24 1509  Gold Top - SST  PROCEDURE ONCE         08/25/24 1508    08/25/24 1509  Light Blue Top  PROCEDURE ONCE         08/25/24 1508    08/25/24 1509  CBC Auto Differential  PROCEDURE ONCE         08/25/24 1508    08/25/24 1508  sodium chloride 0.9 % flush 10 mL  As Needed         08/25/24 1508    Unscheduled  Oxygen Therapy- Nasal Cannula; Titrate 1-6 LPM Per SpO2; 90 - 95%  Continuous PRN       08/25/24 1508    Unscheduled  ECG 12 Lead ED Triage Standing Order; Chest Pain  As Needed      Comments: Persistent, Unrelieved or Recurrent Chest Pain    08/25/24 1508    Unscheduled  Oxygen Therapy- Nasal Cannula; Titrate 1-6 LPM Per SpO2; 88 - 92%  Continuous PRN       08/25/24 2210    Unscheduled  Follow Hypoglycemia Standing Orders For Blood Glucose <70 & Notify Provider of Treatment  As Needed      Comments: Follow Hypoglycemia Orders As Outlined in Process Instructions (Open Order Report to View Full Instructions)  Notify Provider Any Time Hypoglycemia Treatment is Administered    08/25/24 2312                  Physician Progress Notes (last 24 hours)  Notes from 08/25/24 0714 through 08/26/24 0714   No notes of this type exist for this encounter.       Consult Notes (last 24 hours)  Notes from 08/25/24 0715 through 08/26/24 0715   No notes of this type exist for this encounter.

## 2024-08-26 NOTE — CASE MANAGEMENT/SOCIAL WORK
Discharge Planning Assessment   [facility]     Patient Name: Jie Aragon  MRN: [id number]  Today's Date: 8/26/2024    Admit Date: 8/25/2024    Plan: CM spoke with Pt via telephone d/t Pt in isolation. Pt lives at home in [facility] alone and plans to return at d/c. Pt does not have HH/DME or oxygen. PCP is Adela SANTANA  and gets prescriptions filled at [facility] [address] in [place]. Pt has [employer] denies any trouble with coverage. Family will transport at d/c. CM will follow.   Discharge Needs Assessment       Row Name 08/26/24 1600       Living Environment    People in Home alone    Current Living Arrangements home    Potentially Unsafe Housing Conditions none    In the past 12 months has the electric, gas, oil, or water company threatened to shut off services in your home? Yes    Primary Care Provided by self    Provides Primary Care For no one    Family Caregiver if Needed child(gadiel), adult;parent(s)    Able to Return to Prior Arrangements yes       Resource/Environmental Concerns    Resource/Environmental Concerns none    Transportation Concerns none       Transportation Needs    In the past 12 months, has lack of transportation kept you from medical appointments or from getting medications? no    In the past 12 months, has lack of transportation kept you from meetings, work, or from getting things needed for daily living? No       Food Insecurity    Within the past 12 months, you worried that your food would run out before you got the money to buy more. Never true    Within the past 12 months, the food you bought just didn't last and you didn't have money to get more. Never true       Transition Planning    Patient/Family Anticipates Transition to home    Patient/Family Anticipated Services at Transition none    Transportation Anticipated family or friend will provide       Discharge Needs Assessment    Readmission Within the Last 30 Days no previous admission in last 30 days    Equipment  Currently Used at Home none    Concerns to be Addressed no discharge needs identified    Equipment Needed After Discharge none    Provided Post Acute Provider List? N/A    Provided Post Acute Provider Quality & Resource List? N/A                   Discharge Plan       Row Name 08/26/24 0894       Plan    Plan CM spoke with Pt via telephone d/t Pt in isolation. Pt lives at home in [facility] alone and plans to return at d/c. Pt does not have HH/DME or oxygen. PCP is Adela SANTANA  and gets prescriptions filled at [facility] [address] in [place]. Pt has [employer] denies any trouble with coverage. Family will transport at d/c. CM will follow.                             Lyly Pozo RN

## 2024-08-27 ENCOUNTER — APPOINTMENT (OUTPATIENT)
Dept: CARDIOLOGY | Facility: HOSPITAL | Age: 49
DRG: 871 | End: 2024-08-27
Payer: COMMERCIAL

## 2024-08-27 ENCOUNTER — READMISSION MANAGEMENT (OUTPATIENT)
Dept: CALL CENTER | Facility: HOSPITAL | Age: 49
End: 2024-08-27
Payer: COMMERCIAL

## 2024-08-27 VITALS
SYSTOLIC BLOOD PRESSURE: 100 MMHG | TEMPERATURE: 97.5 F | WEIGHT: 147.5 LBS | OXYGEN SATURATION: 98 % | DIASTOLIC BLOOD PRESSURE: 60 MMHG | HEIGHT: 69 IN | BODY MASS INDEX: 21.84 KG/M2 | HEART RATE: 72 BPM | RESPIRATION RATE: 20 BRPM

## 2024-08-27 PROBLEM — D89.831 CYTOKINE RELEASE SYNDROME, GRADE 1: Status: ACTIVE | Noted: 2024-08-27

## 2024-08-27 LAB
ALBUMIN SERPL-MCNC: 4 G/DL (ref 3.5–5.2)
ALBUMIN/GLOB SERPL: 1.4 G/DL
ALP SERPL-CCNC: 180 U/L (ref 39–117)
ALT SERPL W P-5'-P-CCNC: 76 U/L (ref 1–33)
ANION GAP SERPL CALCULATED.3IONS-SCNC: 13.8 MMOL/L (ref 5–15)
AST SERPL-CCNC: 65 U/L (ref 1–32)
BASOPHILS # BLD AUTO: 0.06 10*3/MM3 (ref 0–0.2)
BASOPHILS NFR BLD AUTO: 0.4 % (ref 0–1.5)
BH CV ECHO MEAS - AO MAX PG: 6.6 MMHG
BH CV ECHO MEAS - AO MEAN PG: 4 MMHG
BH CV ECHO MEAS - AO ROOT DIAM: 3 CM
BH CV ECHO MEAS - AO V2 MAX: 128 CM/SEC
BH CV ECHO MEAS - AO V2 VTI: 26.7 CM
BH CV ECHO MEAS - EDV(CUBED): 125 ML
BH CV ECHO MEAS - EDV(MOD-SP2): 49.9 ML
BH CV ECHO MEAS - EDV(MOD-SP4): 49.3 ML
BH CV ECHO MEAS - EF(MOD-BP): 57.4 %
BH CV ECHO MEAS - EF(MOD-SP2): 61.5 %
BH CV ECHO MEAS - EF(MOD-SP4): 52.1 %
BH CV ECHO MEAS - ESV(CUBED): 32.8 ML
BH CV ECHO MEAS - ESV(MOD-SP2): 19.2 ML
BH CV ECHO MEAS - ESV(MOD-SP4): 23.6 ML
BH CV ECHO MEAS - FS: 36 %
BH CV ECHO MEAS - IVS/LVPW: 1.14 CM
BH CV ECHO MEAS - IVSD: 0.8 CM
BH CV ECHO MEAS - LA DIMENSION: 2.6 CM
BH CV ECHO MEAS - LAT PEAK E' VEL: 10.7 CM/SEC
BH CV ECHO MEAS - LV DIASTOLIC VOL/BSA (35-75): 27.1 CM2
BH CV ECHO MEAS - LV MASS(C)D: 125.1 GRAMS
BH CV ECHO MEAS - LV SYSTOLIC VOL/BSA (12-30): 13 CM2
BH CV ECHO MEAS - LVIDD: 5 CM
BH CV ECHO MEAS - LVIDS: 3.2 CM
BH CV ECHO MEAS - LVOT AREA: 3.1 CM2
BH CV ECHO MEAS - LVOT DIAM: 2 CM
BH CV ECHO MEAS - LVPWD: 0.7 CM
BH CV ECHO MEAS - MED PEAK E' VEL: 11 CM/SEC
BH CV ECHO MEAS - MV A MAX VEL: 54.1 CM/SEC
BH CV ECHO MEAS - MV E MAX VEL: 104 CM/SEC
BH CV ECHO MEAS - MV E/A: 1.92
BH CV ECHO MEAS - PA ACC TIME: 0.18 SEC
BH CV ECHO MEAS - RAP SYSTOLE: 10 MMHG
BH CV ECHO MEAS - RVSP: 26.6 MMHG
BH CV ECHO MEAS - SV(MOD-SP2): 30.7 ML
BH CV ECHO MEAS - SV(MOD-SP4): 25.7 ML
BH CV ECHO MEAS - SVI(MOD-SP2): 16.9 ML/M2
BH CV ECHO MEAS - SVI(MOD-SP4): 14.1 ML/M2
BH CV ECHO MEAS - TAPSE (>1.6): 1.79 CM
BH CV ECHO MEAS - TR MAX PG: 16.6 MMHG
BH CV ECHO MEAS - TR MAX VEL: 204 CM/SEC
BH CV ECHO MEASUREMENTS AVERAGE E/E' RATIO: 9.59
BILIRUB SERPL-MCNC: <0.2 MG/DL (ref 0–1.2)
BUN SERPL-MCNC: 13 MG/DL (ref 6–20)
BUN/CREAT SERPL: 24.1 (ref 7–25)
CALCIUM SPEC-SCNC: 9.3 MG/DL (ref 8.6–10.5)
CHLORIDE SERPL-SCNC: 105 MMOL/L (ref 98–107)
CO2 SERPL-SCNC: 21.2 MMOL/L (ref 22–29)
CREAT SERPL-MCNC: 0.54 MG/DL (ref 0.57–1)
DEPRECATED RDW RBC AUTO: 62.3 FL (ref 37–54)
EGFRCR SERPLBLD CKD-EPI 2021: 112.3 ML/MIN/1.73
EOSINOPHIL # BLD AUTO: 0 10*3/MM3 (ref 0–0.4)
EOSINOPHIL NFR BLD AUTO: 0 % (ref 0.3–6.2)
ERYTHROCYTE [DISTWIDTH] IN BLOOD BY AUTOMATED COUNT: 18.4 % (ref 12.3–15.4)
GLOBULIN UR ELPH-MCNC: 2.9 GM/DL
GLUCOSE SERPL-MCNC: 134 MG/DL (ref 65–99)
HCT VFR BLD AUTO: 33 % (ref 34–46.6)
HGB BLD-MCNC: 10.8 G/DL (ref 12–15.9)
IMM GRANULOCYTES # BLD AUTO: 0.24 10*3/MM3 (ref 0–0.05)
IMM GRANULOCYTES NFR BLD AUTO: 1.4 % (ref 0–0.5)
LEFT ATRIUM VOLUME INDEX: 14.3 ML/M2
LYMPHOCYTES # BLD AUTO: 1.09 10*3/MM3 (ref 0.7–3.1)
LYMPHOCYTES NFR BLD AUTO: 6.5 % (ref 19.6–45.3)
MAGNESIUM SERPL-MCNC: 2.1 MG/DL (ref 1.6–2.6)
MCH RBC QN AUTO: 30.9 PG (ref 26.6–33)
MCHC RBC AUTO-ENTMCNC: 32.7 G/DL (ref 31.5–35.7)
MCV RBC AUTO: 94.3 FL (ref 79–97)
MONOCYTES # BLD AUTO: 0.65 10*3/MM3 (ref 0.1–0.9)
MONOCYTES NFR BLD AUTO: 3.9 % (ref 5–12)
NEUTROPHILS NFR BLD AUTO: 14.71 10*3/MM3 (ref 1.7–7)
NEUTROPHILS NFR BLD AUTO: 87.8 % (ref 42.7–76)
NRBC BLD AUTO-RTO: 0 /100 WBC (ref 0–0.2)
PHOSPHATE SERPL-MCNC: 3.6 MG/DL (ref 2.5–4.5)
PLATELET # BLD AUTO: 184 10*3/MM3 (ref 140–450)
PMV BLD AUTO: 10.3 FL (ref 6–12)
POTASSIUM SERPL-SCNC: 4.8 MMOL/L (ref 3.5–5.2)
PROT SERPL-MCNC: 6.9 G/DL (ref 6–8.5)
RBC # BLD AUTO: 3.5 10*6/MM3 (ref 3.77–5.28)
SODIUM SERPL-SCNC: 140 MMOL/L (ref 136–145)
WBC NRBC COR # BLD AUTO: 16.75 10*3/MM3 (ref 3.4–10.8)

## 2024-08-27 PROCEDURE — 80053 COMPREHEN METABOLIC PANEL: CPT | Performed by: INTERNAL MEDICINE

## 2024-08-27 PROCEDURE — 85025 COMPLETE CBC W/AUTO DIFF WBC: CPT | Performed by: INTERNAL MEDICINE

## 2024-08-27 PROCEDURE — 93306 TTE W/DOPPLER COMPLETE: CPT

## 2024-08-27 PROCEDURE — 93306 TTE W/DOPPLER COMPLETE: CPT | Performed by: SPECIALIST

## 2024-08-27 PROCEDURE — 84100 ASSAY OF PHOSPHORUS: CPT | Performed by: INTERNAL MEDICINE

## 2024-08-27 PROCEDURE — 25010000002 HEPARIN (PORCINE) PER 1000 UNITS: Performed by: INTERNAL MEDICINE

## 2024-08-27 PROCEDURE — 83735 ASSAY OF MAGNESIUM: CPT | Performed by: INTERNAL MEDICINE

## 2024-08-27 PROCEDURE — 63710000001 DEXAMETHASONE PER 0.25 MG: Performed by: INTERNAL MEDICINE

## 2024-08-27 RX ORDER — DEXAMETHASONE 6 MG/1
6 TABLET ORAL
Qty: 2 TABLET | Refills: 0 | Status: SHIPPED | OUTPATIENT
Start: 2024-08-28 | End: 2024-08-30

## 2024-08-27 RX ADMIN — Medication 50 MG: at 08:53

## 2024-08-27 RX ADMIN — HEPARIN SODIUM 5000 UNITS: 5000 INJECTION INTRAVENOUS; SUBCUTANEOUS at 08:53

## 2024-08-27 RX ADMIN — DEXAMETHASONE 6 MG: 4 TABLET ORAL at 08:53

## 2024-08-27 RX ADMIN — NICOTINE 1 PATCH: 21 PATCH TRANSDERMAL at 08:54

## 2024-08-27 RX ADMIN — SERTRALINE 50 MG: 50 TABLET, FILM COATED ORAL at 08:53

## 2024-08-27 RX ADMIN — Medication 10 ML: at 08:54

## 2024-08-27 RX ADMIN — NIRMATRELVIR AND RITONAVIR 3 TABLET: KIT at 08:53

## 2024-08-27 RX ADMIN — ASPIRIN 81 MG: 81 TABLET, COATED ORAL at 08:53

## 2024-08-27 NOTE — PLAN OF CARE
Goal Outcome Evaluation:         Patient being discharged today

## 2024-08-27 NOTE — OUTREACH NOTE
Prep Survey      Flowsheet Row Responses   [facility]? [facility]   Is LACE score < 7 ? No   Eligibility Readm Mgmt   Discharge diagnosis COVID-19   Does the patient have one of the following disease processes/diagnoses(primary or secondary)? Other   Prep survey completed? Yes            Lori PIERCE - Registered Nurse

## 2024-08-27 NOTE — PLAN OF CARE
Goal Outcome Evaluation:  Plan of Care Reviewed With: patient        Progress: improving  Outcome Evaluation: Patient resting in bed. Patient A&O x4, VSS on room air. Patient ambulated to bathroom independently this shift. Patient voices no concerns at this time, will continue with POC.

## 2024-08-28 ENCOUNTER — TELEPHONE (OUTPATIENT)
Dept: ONCOLOGY | Facility: CLINIC | Age: 49
End: 2024-08-28
Payer: COMMERCIAL

## 2024-08-28 NOTE — TELEPHONE ENCOUNTER
RN discussed with MD who said patient can come in for appointments as planned on 9/3 as long as the patient is feeling up to it. RN called patient and relayed this to her. She voiced understanding. RN encouraged her to please call us if she were to need anything in the meantime. She voiced understanding.

## 2024-08-28 NOTE — TELEPHONE ENCOUNTER
Pt was admitted in the hospital her at [facility] on Sunday. She came to ED with chest pains, she was admitted because she was Covid positive and septic, low blood pressure and low oxygen. She was release her from hospital yesterday 8/274 . She  has lab,ov and tx  on 9/3  she  wanted to know if  she still need to come in for all appt s on 9/3

## 2024-08-30 DIAGNOSIS — C56.3 MALIGNANT NEOPLASM OF BOTH OVARIES: Primary | ICD-10-CM

## 2024-08-30 LAB
BACTERIA SPEC AEROBE CULT: NORMAL
BACTERIA SPEC AEROBE CULT: NORMAL

## 2024-08-30 RX ORDER — PALONOSETRON 0.05 MG/ML
0.25 INJECTION, SOLUTION INTRAVENOUS ONCE
OUTPATIENT
Start: 2024-09-03

## 2024-08-30 RX ORDER — DIPHENHYDRAMINE HYDROCHLORIDE 50 MG/ML
50 INJECTION INTRAMUSCULAR; INTRAVENOUS AS NEEDED
OUTPATIENT
Start: 2024-09-03

## 2024-08-30 RX ORDER — FAMOTIDINE 10 MG/ML
20 INJECTION, SOLUTION INTRAVENOUS AS NEEDED
OUTPATIENT
Start: 2024-09-03

## 2024-08-30 RX ORDER — FAMOTIDINE 10 MG/ML
20 INJECTION, SOLUTION INTRAVENOUS ONCE
OUTPATIENT
Start: 2024-09-03

## 2024-08-30 RX ORDER — SODIUM CHLORIDE 9 MG/ML
20 INJECTION, SOLUTION INTRAVENOUS ONCE
OUTPATIENT
Start: 2024-09-03

## 2024-09-03 ENCOUNTER — OFFICE VISIT (OUTPATIENT)
Dept: ONCOLOGY | Facility: CLINIC | Age: 49
End: 2024-09-03
Payer: COMMERCIAL

## 2024-09-03 ENCOUNTER — LAB (OUTPATIENT)
Dept: ONCOLOGY | Facility: CLINIC | Age: 49
End: 2024-09-03
Payer: COMMERCIAL

## 2024-09-03 ENCOUNTER — INFUSION (OUTPATIENT)
Dept: ONCOLOGY | Facility: HOSPITAL | Age: 49
End: 2024-09-03
Payer: COMMERCIAL

## 2024-09-03 VITALS
HEART RATE: 116 BPM | OXYGEN SATURATION: 99 % | TEMPERATURE: 98.2 F | DIASTOLIC BLOOD PRESSURE: 77 MMHG | RESPIRATION RATE: 18 BRPM | SYSTOLIC BLOOD PRESSURE: 114 MMHG

## 2024-09-03 VITALS
DIASTOLIC BLOOD PRESSURE: 77 MMHG | BODY MASS INDEX: 21.8 KG/M2 | WEIGHT: 147.6 LBS | RESPIRATION RATE: 18 BRPM | TEMPERATURE: 97.3 F | SYSTOLIC BLOOD PRESSURE: 108 MMHG | OXYGEN SATURATION: 98 % | HEART RATE: 91 BPM

## 2024-09-03 DIAGNOSIS — G62.9 NEUROPATHY: ICD-10-CM

## 2024-09-03 DIAGNOSIS — T45.1X5D CHEMOTHERAPY ADVERSE REACTION, SUBSEQUENT ENCOUNTER: Primary | ICD-10-CM

## 2024-09-03 DIAGNOSIS — C56.3 MALIGNANT NEOPLASM OF BOTH OVARIES: ICD-10-CM

## 2024-09-03 DIAGNOSIS — C56.3 MALIGNANT NEOPLASM OF BOTH OVARIES: Primary | ICD-10-CM

## 2024-09-03 DIAGNOSIS — Z95.828 PORT-A-CATH IN PLACE: ICD-10-CM

## 2024-09-03 LAB
ALBUMIN SERPL-MCNC: 4.4 G/DL (ref 3.5–5.2)
ALBUMIN/GLOB SERPL: 1.5 G/DL
ALP SERPL-CCNC: 132 U/L (ref 39–117)
ALT SERPL W P-5'-P-CCNC: 49 U/L (ref 1–33)
ANION GAP SERPL CALCULATED.3IONS-SCNC: 11 MMOL/L (ref 5–15)
AST SERPL-CCNC: 25 U/L (ref 1–32)
BASOPHILS # BLD AUTO: 0.02 10*3/MM3 (ref 0–0.2)
BASOPHILS NFR BLD AUTO: 0.2 % (ref 0–1.5)
BILIRUB SERPL-MCNC: 0.2 MG/DL (ref 0–1.2)
BUN SERPL-MCNC: 14 MG/DL (ref 6–20)
BUN/CREAT SERPL: 18.9 (ref 7–25)
CALCIUM SPEC-SCNC: 9.7 MG/DL (ref 8.6–10.5)
CANCER AG125 SERPL QL: 8.8 U/ML (ref 0–38.1)
CHLORIDE SERPL-SCNC: 99 MMOL/L (ref 98–107)
CO2 SERPL-SCNC: 26 MMOL/L (ref 22–29)
CREAT SERPL-MCNC: 0.74 MG/DL (ref 0.57–1)
DEPRECATED RDW RBC AUTO: 62.5 FL (ref 37–54)
EGFRCR SERPLBLD CKD-EPI 2021: 98.7 ML/MIN/1.73
EOSINOPHIL # BLD AUTO: 0.07 10*3/MM3 (ref 0–0.4)
EOSINOPHIL NFR BLD AUTO: 0.7 % (ref 0.3–6.2)
ERYTHROCYTE [DISTWIDTH] IN BLOOD BY AUTOMATED COUNT: 18.5 % (ref 12.3–15.4)
GLOBULIN UR ELPH-MCNC: 3 GM/DL
GLUCOSE SERPL-MCNC: 115 MG/DL (ref 65–99)
HCT VFR BLD AUTO: 37.1 % (ref 34–46.6)
HGB BLD-MCNC: 12.4 G/DL (ref 12–15.9)
IMM GRANULOCYTES # BLD AUTO: 0.07 10*3/MM3 (ref 0–0.05)
IMM GRANULOCYTES NFR BLD AUTO: 0.7 % (ref 0–0.5)
LYMPHOCYTES # BLD AUTO: 1.47 10*3/MM3 (ref 0.7–3.1)
LYMPHOCYTES NFR BLD AUTO: 14 % (ref 19.6–45.3)
MCH RBC QN AUTO: 31.2 PG (ref 26.6–33)
MCHC RBC AUTO-ENTMCNC: 33.4 G/DL (ref 31.5–35.7)
MCV RBC AUTO: 93.5 FL (ref 79–97)
MONOCYTES # BLD AUTO: 0.8 10*3/MM3 (ref 0.1–0.9)
MONOCYTES NFR BLD AUTO: 7.6 % (ref 5–12)
NEUTROPHILS NFR BLD AUTO: 76.8 % (ref 42.7–76)
NEUTROPHILS NFR BLD AUTO: 8.05 10*3/MM3 (ref 1.7–7)
NRBC BLD AUTO-RTO: 0 /100 WBC (ref 0–0.2)
PLATELET # BLD AUTO: 346 10*3/MM3 (ref 140–450)
PMV BLD AUTO: 9.1 FL (ref 6–12)
POTASSIUM SERPL-SCNC: 4 MMOL/L (ref 3.5–5.2)
PROT SERPL-MCNC: 7.4 G/DL (ref 6–8.5)
RBC # BLD AUTO: 3.97 10*6/MM3 (ref 3.77–5.28)
SODIUM SERPL-SCNC: 136 MMOL/L (ref 136–145)
WBC NRBC COR # BLD AUTO: 10.48 10*3/MM3 (ref 3.4–10.8)

## 2024-09-03 PROCEDURE — 25010000002 PALONOSETRON 0.25 MG/5ML SOLUTION PREFILLED SYRINGE: Performed by: INTERNAL MEDICINE

## 2024-09-03 PROCEDURE — 96413 CHEMO IV INFUSION 1 HR: CPT

## 2024-09-03 PROCEDURE — 96377 APPLICATON ON-BODY INJECTOR: CPT

## 2024-09-03 PROCEDURE — 96367 TX/PROPH/DG ADDL SEQ IV INF: CPT

## 2024-09-03 PROCEDURE — 25010000002 DIPHENHYDRAMINE PER 50 MG: Performed by: INTERNAL MEDICINE

## 2024-09-03 PROCEDURE — 85025 COMPLETE CBC W/AUTO DIFF WBC: CPT | Performed by: INTERNAL MEDICINE

## 2024-09-03 PROCEDURE — 25010000002 PACLITAXEL PER 1 MG: Performed by: NURSE PRACTITIONER

## 2024-09-03 PROCEDURE — 25010000002 FOSAPREPITANT PER 1 MG: Performed by: INTERNAL MEDICINE

## 2024-09-03 PROCEDURE — 96375 TX/PRO/DX INJ NEW DRUG ADDON: CPT

## 2024-09-03 PROCEDURE — 1126F AMNT PAIN NOTED NONE PRSNT: CPT | Performed by: INTERNAL MEDICINE

## 2024-09-03 PROCEDURE — 96415 CHEMO IV INFUSION ADDL HR: CPT

## 2024-09-03 PROCEDURE — 25010000002 PEGFILGRASTIM 6 MG/0.6ML PREFILLED SYRINGE KIT: Performed by: INTERNAL MEDICINE

## 2024-09-03 PROCEDURE — 80053 COMPREHEN METABOLIC PANEL: CPT | Performed by: INTERNAL MEDICINE

## 2024-09-03 PROCEDURE — 86304 IMMUNOASSAY TUMOR CA 125: CPT | Performed by: INTERNAL MEDICINE

## 2024-09-03 PROCEDURE — 25810000003 SODIUM CHLORIDE 0.9 % SOLUTION 250 ML FLEX CONT: Performed by: NURSE PRACTITIONER

## 2024-09-03 PROCEDURE — 25010000002 HEPARIN LOCK FLUSH PER 10 UNITS

## 2024-09-03 PROCEDURE — 99214 OFFICE O/P EST MOD 30 MIN: CPT | Performed by: INTERNAL MEDICINE

## 2024-09-03 PROCEDURE — 25010000002 DEXAMETHASONE SODIUM PHOSPHATE 20 MG/5ML SOLUTION: Performed by: INTERNAL MEDICINE

## 2024-09-03 PROCEDURE — 25010000002 CARBOPLATIN PER 50 MG: Performed by: NURSE PRACTITIONER

## 2024-09-03 PROCEDURE — 25810000003 SODIUM CHLORIDE 0.9 % SOLUTION: Performed by: INTERNAL MEDICINE

## 2024-09-03 PROCEDURE — 25810000003 SODIUM CHLORIDE 0.9 % SOLUTION 500 ML FLEX CONT: Performed by: NURSE PRACTITIONER

## 2024-09-03 PROCEDURE — 96417 CHEMO IV INFUS EACH ADDL SEQ: CPT

## 2024-09-03 RX ORDER — GABAPENTIN 100 MG/1
100 CAPSULE ORAL 3 TIMES DAILY
Qty: 90 CAPSULE | Refills: 3 | Status: SHIPPED | OUTPATIENT
Start: 2024-09-03

## 2024-09-03 RX ORDER — HEPARIN SODIUM (PORCINE) LOCK FLUSH IV SOLN 100 UNIT/ML 100 UNIT/ML
500 SOLUTION INTRAVENOUS AS NEEDED
Status: DISCONTINUED | OUTPATIENT
Start: 2024-09-03 | End: 2024-09-03 | Stop reason: HOSPADM

## 2024-09-03 RX ORDER — SODIUM CHLORIDE 0.9 % (FLUSH) 0.9 %
10 SYRINGE (ML) INJECTION AS NEEDED
Status: DISCONTINUED | OUTPATIENT
Start: 2024-09-03 | End: 2024-09-03 | Stop reason: HOSPADM

## 2024-09-03 RX ORDER — FAMOTIDINE 10 MG/ML
20 INJECTION, SOLUTION INTRAVENOUS ONCE
Status: COMPLETED | OUTPATIENT
Start: 2024-09-03 | End: 2024-09-03

## 2024-09-03 RX ORDER — SODIUM CHLORIDE 9 MG/ML
20 INJECTION, SOLUTION INTRAVENOUS ONCE
Status: COMPLETED | OUTPATIENT
Start: 2024-09-03 | End: 2024-09-03

## 2024-09-03 RX ORDER — SODIUM CHLORIDE 0.9 % (FLUSH) 0.9 %
10 SYRINGE (ML) INJECTION AS NEEDED
OUTPATIENT
Start: 2024-09-03

## 2024-09-03 RX ORDER — HEPARIN SODIUM (PORCINE) LOCK FLUSH IV SOLN 100 UNIT/ML 100 UNIT/ML
500 SOLUTION INTRAVENOUS AS NEEDED
OUTPATIENT
Start: 2024-09-03

## 2024-09-03 RX ORDER — PALONOSETRON 0.05 MG/ML
0.25 INJECTION, SOLUTION INTRAVENOUS ONCE
Status: COMPLETED | OUTPATIENT
Start: 2024-09-03 | End: 2024-09-03

## 2024-09-03 RX ADMIN — FOSAPREPITANT 150 MG: 150 INJECTION, POWDER, LYOPHILIZED, FOR SOLUTION INTRAVENOUS at 10:20

## 2024-09-03 RX ADMIN — PACLITAXEL 250 MG: 6 INJECTION, SOLUTION INTRAVENOUS at 11:46

## 2024-09-03 RX ADMIN — CARBOPLATIN 480 MG: 10 INJECTION, SOLUTION INTRAVENOUS at 15:06

## 2024-09-03 RX ADMIN — DEXAMETHASONE SODIUM PHOSPHATE 20 MG: 4 INJECTION, SOLUTION INTRA-ARTICULAR; INTRALESIONAL; INTRAMUSCULAR; INTRAVENOUS; SOFT TISSUE at 11:14

## 2024-09-03 RX ADMIN — SODIUM CHLORIDE 20 ML/HR: 9 INJECTION, SOLUTION INTRAVENOUS at 10:15

## 2024-09-03 RX ADMIN — PEGFILGRASTIM 6 MG: KIT SUBCUTANEOUS at 15:43

## 2024-09-03 RX ADMIN — HEPARIN 500 UNITS: 100 SYRINGE at 15:47

## 2024-09-03 RX ADMIN — FAMOTIDINE 20 MG: 10 INJECTION INTRAVENOUS at 10:17

## 2024-09-03 RX ADMIN — PALONOSETRON 0.25 MG: 0.25 INJECTION, SOLUTION INTRAVENOUS at 10:16

## 2024-09-03 RX ADMIN — Medication 10 ML: at 15:47

## 2024-09-03 RX ADMIN — DIPHENHYDRAMINE HYDROCHLORIDE 50 MG: 50 INJECTION, SOLUTION INTRAMUSCULAR; INTRAVENOUS at 10:49

## 2024-09-03 NOTE — PROGRESS NOTES
Subjective     Date: 9/3/2024    Referring Provider  No ref. provider found    Chief Complaint  Malignant neoplasm of both ovaries      Cancer Staging   FIGO Stage II (cT2, cN0, cM0)  Stage IC (pT1c2, pN0, cM0)      Subjective          Jie Aragon is a 49 y.o. female who presents today to [facility] for follow up.     HPI:   49 y.o. 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     Oncology history:  April 2024: Palpated right lower abdominal mass while applying lotion in tanning salon. This was associated with abdominal pain, bloating, and nausea. Care was delayed because patient did not have insurance at the time. Eventually went to PCP who ordered CT imaging of the abdomen.  May 8 2024: CT imaging R mass 17 x 13 cm and left 11x5 cm with septations, likely ovarian.  66  May 20 2024: Seen by Dr. Dubois for consultation.   May 23 2024: Underwent exploratory laparotomy, total abdominal hysterectomy, bilateral salpingo oophorectomy, omentectomy, appendectomy. Final pathology with left ovary with high grade serous carcinoma 9.5 cm confined to the ovary with no surface involvement identified, Right ovary with involved by high grade serous carcinoma, confined to the ovary. Fallopian tube with no change. Lymph nodes and omentum negative for malignancy. Uterus with high grade squamous intraepithelial lesion ALEX-3.   Final pathology pT1c and pN0 (ovarian ruptured at time of surgery).     She presents today with her daughter in law, Donna. Ms. Aragon is healing well without any post op complications. She is a smoker, smokes 1 pack/day X 30+ years, denies alcohol or drug use. Has family history of paternal grandfather with brain tumor, half brother with leukemia, and son with testicular cancer. She has three children 2 daughters and 1 son ranging from 26-31.      Treatment History:          Interval History 07/22/2024   Ms. Aragon presents today prior to C2 of adjuvant carbo/taxol.   [facility] genetic testing is negative for BRCA1/BRCA2.   She reports no NVDC with C1, did have significant myalgia with filgrastim injection which subsided after discontinuation.   Lost all hair after C1. Was emotional, currently on sertaline 25 mg QD with good tolerance. Denies constant neuropathy, admits to having intermittent numbness of her fingers.    Interval History 08/12/2024  Ms. Aragon presents today prior to cycle 3 Carboplatin/Taxol. She reports following cycle 2 she struggled with nausea and vomiting for about three days despite taking zofran and compazine. At present, this has resolved and her appetite has improved. She reports that she is hydrating well. She has intermittent constipation which is relieved with stool softeners and Miralax as needed. Neuropathy is stable. She also continues to report significant bony pain with the use of Claritin daily. She is without any other complaints or concerns at this time.     Interval History 09/03/2024   Ms. Aragon presents prior to C4 of carbo/taxol. Presented to [facility] ED due to COVID-19 with admission from 8/25-27. Reports persistent fatigue, denies any upper respiratory symptoms. Was given Rocephin injection by PCP last week due to sore throat. Reports neuropathy, persistently on b/l hands. No previous NVDC. Currently on gabapentin 100 mg QHS.         Objective     Objective     Allergy:   Allergies   Allergen Reactions    Amoxicillin Other (See Comments)     unknown    Penicillins Other (See Comments)     unknown        Current Medications:   Current Outpatient Medications   Medication Sig Dispense Refill    gabapentin (NEURONTIN) 100 MG capsule Take 1 capsule by mouth 3 Times a Day. 90 capsule 3    granisetron (SANCUSO) 3.1 MG/24HR Place 1 patch on the skin as directed by provider 1 (One) Time Per Week. 4 patch 0    ibuprofen  (ADVIL,MOTRIN) 800 MG tablet Take 1 tablet by mouth Every 8 (Eight) Hours As Needed for Mild Pain. 30 tablet 1    lidocaine-prilocaine (EMLA) 2.5-2.5 % cream Apply to port-a-cath site 30 minutes prior to arrival at infusion center. Cover with plastic wrap. 30 g 1    loratadine (CLARITIN) 10 MG tablet Take 1 tablet by mouth weekly starting the day of growth factor injection. 30 tablet 5    OLANZapine (ZyPREXA) 5 MG tablet Take 1 tablet by mouth Every Night. Take nightly x 4 starting night of chemotherapy. 4 tablet 5    prochlorperazine (COMPAZINE) 10 MG tablet Take 1 tablet by mouth Every 6 (Six) Hours As Needed for Nausea or Vomiting. 30 tablet 1    pyridoxine (VITAMIN B-6) 50 MG tablet tablet Take 1 tablet by mouth Daily. 30 tablet 3    sertraline (Zoloft) 50 MG tablet Take 1 tablet by mouth Daily. 30 tablet 3     No current facility-administered medications for this visit.       Past Medical History:  Past Medical History:   Diagnosis Date    Change in vision     Multiple gestation     Ovarian cancer 05/24/2024    Wears dentures     UPPER AND LOWER       Past Surgical History:  Past Surgical History:   Procedure Laterality Date    APPENDECTOMY N/A 5/23/2024    Procedure: APPENDECTOMY;  Surgeon: Shanda Dubois MD;  Location:  [facility];  Service: Gynecology Oncology;  Laterality: N/A;    CYSTOSCOPY Bilateral 5/23/2024    Procedure: CYSTOSCOPY TEMPORARY PLACEMENT BILATERAL URETERAL CATHETER;  Surgeon: Shanda Dubois MD;  Location:  [facility];  Service: Gynecology Oncology;  Laterality: Bilateral;    EXPLORATORY LAPAROTOMY, TOTAL ABDOMINAL HYSTERECTOMY SALPINGO OOPHORECTOMY N/A 5/23/2024    Procedure: EXPLORATORY LAPAROTOMY, TOTAL ABDOMINAL HYSTERECTOMY BILATERAL SALPINGO OOPHORECTOMY, CANCER STAGING/ OPTIMAL DEBULKLING (R=0);  Surgeon: Shanda Dubois MD;  Location:  [facility];  Service: Gynecology Oncology;  Laterality: N/A;    OMENTECTOMY N/A 5/23/2024    Procedure: OMENTECTOMY;  Surgeon: Shanda Dubois  MD WILDER;  Location:  [facility];  Service: Gynecology Oncology;  Laterality: N/A;    PORTACATH PLACEMENT Right 6/24/2024    Procedure: INSERTION OF PORTACATH;  Surgeon: Yakov Chung MD;  Location:  [facility];  Service: General;  Laterality: Right;    TUBAL ABDOMINAL LIGATION         Social History:  Social History     Socioeconomic History    Marital status: Single   Tobacco Use    Smoking status: Every Day     Current packs/day: 1.00     Average packs/day: 1 pack/day for 15.0 years (15.0 ttl pk-yrs)     Types: Cigarettes     Passive exposure: Current    Smokeless tobacco: Never   Vaping Use    Vaping status: Never Used   Substance and Sexual Activity    Alcohol use: Never    Drug use: Never    Sexual activity: Yes     Partners: Male     Birth control/protection: I.U.D., Tubal ligation     Jie Aragon  reports that she has been smoking cigarettes. She has a 15 pack-year smoking history. She has been exposed to tobacco smoke. She has never used smokeless tobacco. I have educated her on the risk of diseases from using tobacco products such as cancer, COPD, and heart disease.     I advised her to quit and she is not willing to quit.    I spent 3  minutes counseling the patient.         Family History:  Family History   Problem Relation Age of Onset    Stroke Father     Hypertension Father     Heart attack Father     Deep vein thrombosis Mother     Leukemia Brother     Testicular cancer Son        Review of Systems:  Review of Systems   All other systems reviewed and are negative.      Vital Signs:   /77   Pulse 91   Temp 97.3 °F (36.3 °C) (Temporal)   Resp 18   Wt 67 kg (147 lb 9.6 oz)   SpO2 98%   BMI 21.80 kg/m²      Physical Exam:  Physical Exam  Vitals reviewed.   Constitutional:       General: She is not in acute distress.     Appearance: Normal appearance. She is not ill-appearing.   HENT:      Head: Normocephalic and atraumatic.      Mouth/Throat:      Mouth: Mucous membranes are moist.       Pharynx: Oropharynx is clear.   Eyes:      Conjunctiva/sclera: Conjunctivae normal.      Pupils: Pupils are equal, round, and reactive to light.   Cardiovascular:      Rate and Rhythm: Normal rate and regular rhythm.      Heart sounds: No murmur heard.  Pulmonary:      Effort: Pulmonary effort is normal. No respiratory distress.      Breath sounds: Normal breath sounds. No wheezing.   Chest:      Comments: +R chest port  Abdominal:      General: Abdomen is flat. Bowel sounds are normal. There is no distension.      Palpations: Abdomen is soft. There is no mass.      Tenderness: There is no abdominal tenderness. There is no guarding.   Musculoskeletal:         General: No swelling. Normal range of motion.      Cervical back: Normal range of motion.   Lymphadenopathy:      Cervical: No cervical adenopathy.   Skin:     General: Skin is warm and dry.   Neurological:      General: No focal deficit present.      Mental Status: She is alert and oriented to person, place, and time. Mental status is at baseline.   Psychiatric:         Mood and Affect: Mood normal.         PHQ-9 Score  PHQ-9 Total Score:       Pain Score  Vitals:    09/03/24 0838   BP: 108/77   Pulse: 91   Resp: 18   Temp: 97.3 °F (36.3 °C)   TempSrc: Temporal   SpO2: 98%   Weight: 67 kg (147 lb 9.6 oz)   PainSc: 0-No pain           ECOG score: 0           PAINSCOREQUALITYMETRIC:   Vitals:    09/03/24 0838   PainSc: 0-No pain              Lab Review  Lab Results   Component Value Date    WBC 10.48 09/03/2024    HGB 12.4 09/03/2024    HCT 37.1 09/03/2024    MCV 93.5 09/03/2024    RDW 18.5 (H) 09/03/2024     09/03/2024    NEUTRORELPCT 76.8 (H) 09/03/2024    LYMPHORELPCT 14.0 (L) 09/03/2024    MONORELPCT 7.6 09/03/2024    EOSRELPCT 0.7 09/03/2024    BASORELPCT 0.2 09/03/2024    NEUTROABS 8.05 (H) 09/03/2024    LYMPHSABS 1.47 09/03/2024     Lab Results   Component Value Date     09/03/2024    K 4.0 09/03/2024    CO2 26.0 09/03/2024    CL 99  09/03/2024    BUN 14 09/03/2024    CREATININE 0.74 09/03/2024    GLUCOSE 115 (H) 09/03/2024    CALCIUM 9.7 09/03/2024    ALKPHOS 132 (H) 09/03/2024    AST 25 09/03/2024    ALT 49 (H) 09/03/2024    BILITOT 0.2 09/03/2024    ALBUMIN 4.4 09/03/2024    PROTEINTOT 7.4 09/03/2024    MG 2.1 08/27/2024    PHOS 3.6 08/27/2024         Radiology Results  CT Angiogram Chest Pulmonary Embolism (05/26/2024 07:45)   FINDINGS:  Contrast bolus timing is satisfactory to evaluate for pulmonary  embolism.  Pulmonary arteries: normal.  No pulmonary arterial filling defect is  seen.  Heart and pericardium: normal.  Aorta: normal.  Esophagus: normal.  Lungs and airways: Linear bibasilar atelectasis.  Peripheral emphysema  and cysts..  Pleura: normal.  Lymph nodes: normal.  Musculoskeletal and chest wall: Free air in the abdomen is postsurgical  most likely  Other: n/a     IMPRESSION:  NEGATIVE FOR PULMONARY EMBOLISM.  LINEAR BIBASILAR ATELECTASIS.  FREE AIR IN THE ABDOMEN IS LIKELY POSTSURGICAL    No acute cardiopulmonary process.    Scanned Imaging   CT Abdomen/Pelvis with Contrast (05/08/2024)           Pathology:   05/23/2024              Genetic Testing          Assessment / Plan         Assessment and Plan   iJe Aragon is a 49 y.o. 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 PD-L1 <1%   Cancer Staging   FIGO Stage II (cT2, cN0, cM0)  Stage IC (pT1c2, pN0, cM0)  -patient presents with palpation of RLQ abdominal mass, associated with abdominal pain, bloating, and nausea for ~1 months prior to CT A/P. CT A/P with R mass 17 cm and L mass 11 cm c/f ovarian cancer. : 65  -5/2024 Underwent exploratory laparotomy, total abdominal hysterectomy, bilateral salpingo oophorectomy, omentectomy, appendectomy by Dr. Dubois at [facility]. Final pathology with left ovary with high grade serous carcinoma 9.5 cm confined to the ovary with no surface involvement identified, Right ovary with involved by high grade  serous carcinoma, confined to the ovary. Fallopian tube with no change. Lymph nodes and omentum negative for malignancy. Uterus with high grade squamous intraepithelial lesion ALEX-3.   Final pathology pT1c and pN0 (ovarian ruptured at time of surgery).   -We reviewed the NCCN guidelines which indicate adjuvant chemotherapy with carboplatin AUC 5-6 D1 and paclitaxel 175 mg/m2 D1 every 21 days for 6 cycles.  -C2 7/22- tolerated well  -C4 9/3- proceed today with 20% dose reduction of paclitaxel and 40% dose reduction of carboplatin (to AUC 4)    2. Malignancy associated pain  -Currently denies     3. Nutrition   - Recommend high calories/protein diet during the treatment     4. Chemotherapy induced neuropathy (Grade 1)  - Admits to persistent neuropathy of b/l fingers  - Started on B6 and gabapentin 100 mg TID  - Carboplatin dose decreased to AUC 4 and paclitaxel by 20%     5. Depression   - Started patient on sertraline 25 mg QD with good tolerance, increased to 50 mg QD       Discussed possible differential diagnoses, testing, treatment, recommended non-pharmacological interventions, risks, warning signs to monitor for that would indicate need for follow-up in clinic or ER. If no improvement with these regimens or you have new or worsening symptoms follow-up. Patient verbalizes understanding and agreement with plan of care. Denies further needs or concerns.     Patient was given instructions and counseling regarding her condition and for health maintenance advised.       All questions were answered to her satisfaction.    BMI is within normal parameters. No other follow-up for BMI required.         ACO / ALEX/Other  Quality measures  -  Jie Aragon did not receive 2023 flu vaccine.  This was recommended.  -  Jie Aragon reports a pain score of 0.  Given her pain assessment as noted, treatment options were discussed and the following options were decided upon as a follow-up plan to address the  patient's pain: continuation of current treatment plan for pain.  -  Current outpatient and discharge medications have been reconciled for the patient.  Reviewed by: Ros Vasquez MD        Meds ordered during this visit  New Medications Ordered This Visit   Medications    gabapentin (NEURONTIN) 100 MG capsule     Sig: Take 1 capsule by mouth 3 Times a Day.     Dispense:  90 capsule     Refill:  3       Visit Diagnoses    ICD-10-CM ICD-9-CM   1. Chemotherapy adverse reaction, subsequent encounter  T45.1X5D V58.89   2. Malignant neoplasm of both ovaries  C56.3 183.0   3. Neuropathy  G62.9 355.9           Follow Up   Prior to C5 of treatment         This document has been electronically signed by Ros Vasquez MD   September 3, 2024 09:28 EDT    Dictated Utilizing Dragon Dictation: Part of this note may be an electronic transcription/translation of spoken language to printed text using the Dragon Dictation System.

## 2024-09-03 NOTE — PROGRESS NOTES
Venipuncture Blood Specimen Collection  Venipuncture performed in left arm by Modesta Nguyen MA with good hemostasis. Patient tolerated the procedure well without complications.   09/03/24   Modesta Nguyen MA

## 2024-09-07 NOTE — DISCHARGE SUMMARY
[facility] HOSPITALISTS DISCHARGE SUMMARY    Patient Identification:  Name:  Jie Aragon  Age:  49 y.o.  Sex:  female  :  1975  MRN:  [id number]  Visit Number:  [id number]    Date of Admission: 2024  Date of Discharge:  2024     PCP: Adela Gtz APRN    DISCHARGE DIAGNOSIS   #Sepsis due to an acute COVID-19 infection  #New inferior and lateral EKG changes, present on admission   #History of stage II bilateral ovarian cancer, status post hysterectomy, salpingo-oophorectomy, and chemotherapy (Carboplatin/Taxol, on cycle 3 and the last dose was 2024)  #History of malignancy associated pain  #History of chemotherapy induced neuropathy  #Tobacco use      HOSPITAL COURSE  Patient is a 49 y.o. female presented to [facility] complaining of fatigue and fever.  Please see the admitting history and physical for further details.      Patient tested positive for COVID-19 on presentation but did not have an increased oxygen requirement. Due to being immunocompromised and at high risk for severe disease due to recent chemotherapy she was admitted and treated with Paxlovid and dexamethasone. Her symptoms improved and she remained stable on room air. She had pleuritic chest pain prior to admission that did not have recurrence and troponin was trended and remained negative. An echocardiogram was also normal, with normal LV systolic function of 61-65% and normal diastolic function. Her initial EKG showed some t wave changes in lateral leads but there was a significant amount of artifact and the changes were not present on a repeat EKG. Recommend consideration of outpatient stress test. Patient was able to be discharged home in stable condition to complete the course of dexamethasone and paxlovid.    Body mass index is 21.78 kg/m².  Wt Readings from Last 3 Encounters:   24 67 kg (147 lb 9.6 oz)   24 66.9 kg (147 lb 8 oz)   24 67.4 kg (148 lb 9.6 oz)        PHYSICAL EXAM:   Constitutional:  Well-developed and well-nourished.  No acute distress.      HENT:  Head:  Normocephalic and atraumatic.    Cardiovascular:  Normal rate, regular rhythm  Pulmonary/Chest:  Normal rate and effort. Breath sounds clear to auscultation bilaterally   Musculoskeletal:  No deformity.    Neurological: Awake, alert, no focal deficit on gross examination. No slurred speech or facial droop.   Skin:  Skin is warm and dry.   Peripheral vascular:  No cyanosis, no edema.    DISCHARGE DISPOSITION   Stable    DISCHARGE MEDICATIONS:     Your medication list        START taking these medications        Instructions Last Dose Given Next Dose Due   dexAMETHasone 6 MG tablet  Commonly known as: DECADRON  Start taking on: August 28, 2024      Take 1 tablet by mouth Daily With Breakfast for 2 doses.       Nirmatrelvir & Ritonavir (300mg/100mg)  Commonly known as: PAXLOVID      Take 3 tablets by mouth 2 (Two) Times a Day for 6 doses.              CONTINUE taking these medications        Instructions Last Dose Given Next Dose Due   gabapentin 100 MG capsule  Commonly known as: NEURONTIN      Take 1 capsule by mouth At Night As Needed (neuropathy).       ibuprofen 800 MG tablet  Commonly known as: ADVIL,MOTRIN      Take 1 tablet by mouth Every 8 (Eight) Hours As Needed for Mild Pain.       lidocaine-prilocaine 2.5-2.5 % cream  Commonly known as: EMLA      Apply to port-a-cath site 30 minutes prior to arrival at infusion center. Cover with plastic wrap.       loratadine 10 MG tablet  Commonly known as: CLARITIN      Take 1 tablet by mouth weekly starting the day of growth factor injection.       OLANZapine 5 MG tablet  Commonly known as: ZyPREXA      Take 1 tablet by mouth Every Night. Take nightly x 4 starting night of chemotherapy.       prochlorperazine 10 MG tablet  Commonly known as: COMPAZINE      Take 1 tablet by mouth Every 6 (Six) Hours As Needed for Nausea or Vomiting.       Sancuso 3.1  MG/24HR  Generic drug: granisetron      Place 1 patch on the skin as directed by provider 1 (One) Time Per Week.       sertraline 50 MG tablet  Commonly known as: Zoloft      Take 1 tablet by mouth Daily.       vitamin B-6 50 MG tablet  Commonly known as: PYRIDOXINE      Take 1 tablet by mouth Daily.                 Where to Get Your Medications        These medications were sent to [address] [address]      Hours: Monday to Friday 7 AM to 6 PM Phone: [phone]   dexAMETHasone 6 MG tablet  Nirmatrelvir & Ritonavir (300mg/100mg)            Diet Instructions       Diet: Regular/House Diet; Thin (IDDSI 0)      Discharge Diet: Regular/House Diet    Fluid Consistency: Thin (IDDSI 0)          Activity Instructions       Activity as Tolerated            Additional Instructions for the Follow-ups that You Need to Schedule       Discharge Follow-up with PCP   As directed       Currently Documented PCP:    Adela Gtz APRN    PCP Phone Number:    [phone]     Follow Up Details: within 1 week               Follow-up Information       Adela Gtz APRN .    Specialty: Family Medicine  Why: within 1 week  Contact information:  [address] [address]  [phone]               Adela Gtz APRN .    Specialty: Family Medicine  Contact information:  [address] [place] [id number]  [phone]                               CODE STATUS  Code Status and Medical Interventions: CPR (Attempt to Resuscitate); Full Support   Ordered at: 08/25/24 1959     Level Of Support Discussed With:    Patient     Code Status (Patient has no pulse and is not breathing):    CPR (Attempt to Resuscitate)     Medical Interventions (Patient has pulse or is breathing):    Full Support       Jazmine Woo DO  [facility] Hospitalist  09/07/24  18:56 EDT    Please note that this discharge summary required more than 30 minutes to complete.

## 2024-09-12 ENCOUNTER — READMISSION MANAGEMENT (OUTPATIENT)
Dept: CALL CENTER | Facility: HOSPITAL | Age: 49
End: 2024-09-12
Payer: COMMERCIAL

## 2024-09-12 NOTE — OUTREACH NOTE
Medical Week 3 Survey      Flowsheet Row Responses   [facility] facility [facility] discharged from? [facility]   Does the patient have one of the following disease processes/diagnoses(primary or secondary)? Other   Week 3 attempt successful? No   Unsuccessful attempts Attempt 1            Lori DUNN - Registered Nurse

## 2024-09-18 ENCOUNTER — READMISSION MANAGEMENT (OUTPATIENT)
Dept: CALL CENTER | Facility: HOSPITAL | Age: 49
End: 2024-09-18
Payer: COMMERCIAL

## 2024-09-19 DIAGNOSIS — C56.3 MALIGNANT NEOPLASM OF BOTH OVARIES: Primary | ICD-10-CM

## 2024-09-19 RX ORDER — PALONOSETRON 0.05 MG/ML
0.25 INJECTION, SOLUTION INTRAVENOUS ONCE
OUTPATIENT
Start: 2024-09-24

## 2024-09-19 RX ORDER — FAMOTIDINE 10 MG/ML
20 INJECTION, SOLUTION INTRAVENOUS AS NEEDED
OUTPATIENT
Start: 2024-09-24

## 2024-09-19 RX ORDER — DIPHENHYDRAMINE HYDROCHLORIDE 50 MG/ML
50 INJECTION INTRAMUSCULAR; INTRAVENOUS AS NEEDED
OUTPATIENT
Start: 2024-09-24

## 2024-09-19 RX ORDER — SODIUM CHLORIDE 9 MG/ML
20 INJECTION, SOLUTION INTRAVENOUS ONCE
OUTPATIENT
Start: 2024-09-24

## 2024-09-19 RX ORDER — FAMOTIDINE 10 MG/ML
20 INJECTION, SOLUTION INTRAVENOUS ONCE
OUTPATIENT
Start: 2024-09-24

## 2024-09-24 ENCOUNTER — LAB (OUTPATIENT)
Dept: ONCOLOGY | Facility: CLINIC | Age: 49
End: 2024-09-24
Payer: COMMERCIAL

## 2024-09-24 ENCOUNTER — INFUSION (OUTPATIENT)
Dept: ONCOLOGY | Facility: HOSPITAL | Age: 49
End: 2024-09-24
Payer: COMMERCIAL

## 2024-09-24 ENCOUNTER — OFFICE VISIT (OUTPATIENT)
Dept: ONCOLOGY | Facility: CLINIC | Age: 49
End: 2024-09-24
Payer: COMMERCIAL

## 2024-09-24 VITALS
OXYGEN SATURATION: 98 % | BODY MASS INDEX: 22.71 KG/M2 | TEMPERATURE: 97.7 F | SYSTOLIC BLOOD PRESSURE: 124 MMHG | DIASTOLIC BLOOD PRESSURE: 80 MMHG | WEIGHT: 153.3 LBS | HEIGHT: 69 IN | HEART RATE: 93 BPM

## 2024-09-24 VITALS
TEMPERATURE: 97.1 F | HEART RATE: 94 BPM | DIASTOLIC BLOOD PRESSURE: 78 MMHG | SYSTOLIC BLOOD PRESSURE: 126 MMHG | OXYGEN SATURATION: 100 % | HEIGHT: 69 IN | WEIGHT: 154.3 LBS | BODY MASS INDEX: 22.85 KG/M2 | RESPIRATION RATE: 18 BRPM

## 2024-09-24 DIAGNOSIS — C56.3 MALIGNANT NEOPLASM OF BOTH OVARIES: Primary | ICD-10-CM

## 2024-09-24 DIAGNOSIS — C56.3 MALIGNANT NEOPLASM OF BOTH OVARIES: ICD-10-CM

## 2024-09-24 DIAGNOSIS — T45.1X5D CHEMOTHERAPY ADVERSE REACTION, SUBSEQUENT ENCOUNTER: ICD-10-CM

## 2024-09-24 DIAGNOSIS — Z95.828 PORT-A-CATH IN PLACE: ICD-10-CM

## 2024-09-24 LAB
ALBUMIN SERPL-MCNC: 4.4 G/DL (ref 3.5–5.2)
ALBUMIN/GLOB SERPL: 1.5 G/DL
ALP SERPL-CCNC: 182 U/L (ref 39–117)
ALT SERPL W P-5'-P-CCNC: 56 U/L (ref 1–33)
ANION GAP SERPL CALCULATED.3IONS-SCNC: 10.6 MMOL/L (ref 5–15)
AST SERPL-CCNC: 29 U/L (ref 1–32)
BASOPHILS # BLD AUTO: 0.03 10*3/MM3 (ref 0–0.2)
BASOPHILS NFR BLD AUTO: 0.5 % (ref 0–1.5)
BILIRUB SERPL-MCNC: 0.3 MG/DL (ref 0–1.2)
BUN SERPL-MCNC: 9 MG/DL (ref 6–20)
BUN/CREAT SERPL: 12.9 (ref 7–25)
CALCIUM SPEC-SCNC: 9.8 MG/DL (ref 8.6–10.5)
CANCER AG125 SERPL QL: 9.3 U/ML (ref 0–38.1)
CHLORIDE SERPL-SCNC: 105 MMOL/L (ref 98–107)
CO2 SERPL-SCNC: 24.4 MMOL/L (ref 22–29)
CREAT SERPL-MCNC: 0.7 MG/DL (ref 0.57–1)
DEPRECATED RDW RBC AUTO: 63.4 FL (ref 37–54)
EGFRCR SERPLBLD CKD-EPI 2021: 105.5 ML/MIN/1.73
EOSINOPHIL # BLD AUTO: 0.05 10*3/MM3 (ref 0–0.4)
EOSINOPHIL NFR BLD AUTO: 0.8 % (ref 0.3–6.2)
ERYTHROCYTE [DISTWIDTH] IN BLOOD BY AUTOMATED COUNT: 18.1 % (ref 12.3–15.4)
GLOBULIN UR ELPH-MCNC: 3 GM/DL
GLUCOSE SERPL-MCNC: 101 MG/DL (ref 65–99)
HCT VFR BLD AUTO: 36.2 % (ref 34–46.6)
HGB BLD-MCNC: 12.2 G/DL (ref 12–15.9)
IMM GRANULOCYTES # BLD AUTO: 0.03 10*3/MM3 (ref 0–0.05)
IMM GRANULOCYTES NFR BLD AUTO: 0.5 % (ref 0–0.5)
LYMPHOCYTES # BLD AUTO: 1.49 10*3/MM3 (ref 0.7–3.1)
LYMPHOCYTES NFR BLD AUTO: 23.7 % (ref 19.6–45.3)
MCH RBC QN AUTO: 32.3 PG (ref 26.6–33)
MCHC RBC AUTO-ENTMCNC: 33.7 G/DL (ref 31.5–35.7)
MCV RBC AUTO: 95.8 FL (ref 79–97)
MONOCYTES # BLD AUTO: 0.6 10*3/MM3 (ref 0.1–0.9)
MONOCYTES NFR BLD AUTO: 9.5 % (ref 5–12)
NEUTROPHILS NFR BLD AUTO: 4.1 10*3/MM3 (ref 1.7–7)
NEUTROPHILS NFR BLD AUTO: 65 % (ref 42.7–76)
NRBC BLD AUTO-RTO: 0 /100 WBC (ref 0–0.2)
PLATELET # BLD AUTO: 268 10*3/MM3 (ref 140–450)
PMV BLD AUTO: 8.9 FL (ref 6–12)
POTASSIUM SERPL-SCNC: 4 MMOL/L (ref 3.5–5.2)
PROT SERPL-MCNC: 7.4 G/DL (ref 6–8.5)
RBC # BLD AUTO: 3.78 10*6/MM3 (ref 3.77–5.28)
SODIUM SERPL-SCNC: 140 MMOL/L (ref 136–145)
WBC NRBC COR # BLD AUTO: 6.3 10*3/MM3 (ref 3.4–10.8)

## 2024-09-24 PROCEDURE — 25810000003 SODIUM CHLORIDE 0.9 % SOLUTION 500 ML FLEX CONT: Performed by: INTERNAL MEDICINE

## 2024-09-24 PROCEDURE — 96417 CHEMO IV INFUS EACH ADDL SEQ: CPT

## 2024-09-24 PROCEDURE — 1126F AMNT PAIN NOTED NONE PRSNT: CPT | Performed by: INTERNAL MEDICINE

## 2024-09-24 PROCEDURE — 96413 CHEMO IV INFUSION 1 HR: CPT

## 2024-09-24 PROCEDURE — 96415 CHEMO IV INFUSION ADDL HR: CPT

## 2024-09-24 PROCEDURE — 25010000002 PEGFILGRASTIM 6 MG/0.6ML PREFILLED SYRINGE KIT: Performed by: INTERNAL MEDICINE

## 2024-09-24 PROCEDURE — 25010000002 DIPHENHYDRAMINE PER 50 MG: Performed by: INTERNAL MEDICINE

## 2024-09-24 PROCEDURE — 25010000002 CARBOPLATIN PER 50 MG: Performed by: INTERNAL MEDICINE

## 2024-09-24 PROCEDURE — 96367 TX/PROPH/DG ADDL SEQ IV INF: CPT

## 2024-09-24 PROCEDURE — 80053 COMPREHEN METABOLIC PANEL: CPT | Performed by: INTERNAL MEDICINE

## 2024-09-24 PROCEDURE — 25010000002 PACLITAXEL PER 1 MG: Performed by: INTERNAL MEDICINE

## 2024-09-24 PROCEDURE — 25810000003 SODIUM CHLORIDE 0.9 % SOLUTION 250 ML FLEX CONT: Performed by: INTERNAL MEDICINE

## 2024-09-24 PROCEDURE — 96377 APPLICATON ON-BODY INJECTOR: CPT

## 2024-09-24 PROCEDURE — 85025 COMPLETE CBC W/AUTO DIFF WBC: CPT | Performed by: INTERNAL MEDICINE

## 2024-09-24 PROCEDURE — 25010000002 FOSAPREPITANT PER 1 MG: Performed by: INTERNAL MEDICINE

## 2024-09-24 PROCEDURE — 99214 OFFICE O/P EST MOD 30 MIN: CPT | Performed by: INTERNAL MEDICINE

## 2024-09-24 PROCEDURE — 86304 IMMUNOASSAY TUMOR CA 125: CPT | Performed by: INTERNAL MEDICINE

## 2024-09-24 PROCEDURE — 96375 TX/PRO/DX INJ NEW DRUG ADDON: CPT

## 2024-09-24 PROCEDURE — 25010000002 DEXAMETHASONE SODIUM PHOSPHATE 20 MG/5ML SOLUTION: Performed by: INTERNAL MEDICINE

## 2024-09-24 PROCEDURE — 25010000002 HEPARIN LOCK FLUSH PER 10 UNITS

## 2024-09-24 PROCEDURE — 25010000002 PALONOSETRON 0.25 MG/5ML SOLUTION PREFILLED SYRINGE: Performed by: INTERNAL MEDICINE

## 2024-09-24 PROCEDURE — 25810000003 SODIUM CHLORIDE 0.9 % SOLUTION: Performed by: INTERNAL MEDICINE

## 2024-09-24 RX ORDER — HEPARIN SODIUM (PORCINE) LOCK FLUSH IV SOLN 100 UNIT/ML 100 UNIT/ML
500 SOLUTION INTRAVENOUS AS NEEDED
Status: DISCONTINUED | OUTPATIENT
Start: 2024-09-24 | End: 2024-09-24 | Stop reason: HOSPADM

## 2024-09-24 RX ORDER — LIDOCAINE/PRILOCAINE 2.5 %-2.5%
CREAM (GRAM) TOPICAL
Qty: 30 G | Refills: 1 | Status: SHIPPED | OUTPATIENT
Start: 2024-09-24

## 2024-09-24 RX ORDER — FAMOTIDINE 10 MG/ML
20 INJECTION, SOLUTION INTRAVENOUS AS NEEDED
OUTPATIENT
Start: 2024-10-15

## 2024-09-24 RX ORDER — SODIUM CHLORIDE 0.9 % (FLUSH) 0.9 %
10 SYRINGE (ML) INJECTION AS NEEDED
OUTPATIENT
Start: 2024-09-24

## 2024-09-24 RX ORDER — DIPHENHYDRAMINE HYDROCHLORIDE 50 MG/ML
50 INJECTION INTRAMUSCULAR; INTRAVENOUS AS NEEDED
OUTPATIENT
Start: 2024-10-15

## 2024-09-24 RX ORDER — SODIUM CHLORIDE 9 MG/ML
20 INJECTION, SOLUTION INTRAVENOUS ONCE
OUTPATIENT
Start: 2024-10-15

## 2024-09-24 RX ORDER — PALONOSETRON 0.05 MG/ML
0.25 INJECTION, SOLUTION INTRAVENOUS ONCE
Status: COMPLETED | OUTPATIENT
Start: 2024-09-24 | End: 2024-09-24

## 2024-09-24 RX ORDER — SODIUM CHLORIDE 0.9 % (FLUSH) 0.9 %
10 SYRINGE (ML) INJECTION AS NEEDED
Status: DISCONTINUED | OUTPATIENT
Start: 2024-09-24 | End: 2024-09-24 | Stop reason: HOSPADM

## 2024-09-24 RX ORDER — FAMOTIDINE 10 MG/ML
20 INJECTION, SOLUTION INTRAVENOUS ONCE
Status: COMPLETED | OUTPATIENT
Start: 2024-09-24 | End: 2024-09-24

## 2024-09-24 RX ORDER — PYRIDOXINE HCL (VITAMIN B6) 50 MG
50 TABLET ORAL DAILY
Qty: 30 TABLET | Refills: 3 | Status: SHIPPED | OUTPATIENT
Start: 2024-09-24

## 2024-09-24 RX ORDER — PALONOSETRON 0.05 MG/ML
0.25 INJECTION, SOLUTION INTRAVENOUS ONCE
OUTPATIENT
Start: 2024-10-15

## 2024-09-24 RX ORDER — LORATADINE 10 MG/1
TABLET ORAL
Qty: 30 TABLET | Refills: 5 | Status: SHIPPED | OUTPATIENT
Start: 2024-09-24

## 2024-09-24 RX ORDER — HEPARIN SODIUM (PORCINE) LOCK FLUSH IV SOLN 100 UNIT/ML 100 UNIT/ML
500 SOLUTION INTRAVENOUS AS NEEDED
OUTPATIENT
Start: 2024-09-24

## 2024-09-24 RX ORDER — SODIUM CHLORIDE 9 MG/ML
20 INJECTION, SOLUTION INTRAVENOUS ONCE
Status: COMPLETED | OUTPATIENT
Start: 2024-09-24 | End: 2024-09-24

## 2024-09-24 RX ORDER — FAMOTIDINE 10 MG/ML
20 INJECTION, SOLUTION INTRAVENOUS ONCE
OUTPATIENT
Start: 2024-10-15

## 2024-09-24 RX ADMIN — PEGFILGRASTIM 6 MG: KIT SUBCUTANEOUS at 16:17

## 2024-09-24 RX ADMIN — CARBOPLATIN 510 MG: 10 INJECTION, SOLUTION INTRAVENOUS at 15:32

## 2024-09-24 RX ADMIN — DIPHENHYDRAMINE HYDROCHLORIDE 50 MG: 50 INJECTION, SOLUTION INTRAMUSCULAR; INTRAVENOUS at 10:24

## 2024-09-24 RX ADMIN — FOSAPREPITANT 150 MG: 150 INJECTION, POWDER, LYOPHILIZED, FOR SOLUTION INTRAVENOUS at 11:12

## 2024-09-24 RX ADMIN — HEPARIN 500 UNITS: 100 SYRINGE at 16:13

## 2024-09-24 RX ADMIN — PALONOSETRON 0.25 MG: 0.25 INJECTION, SOLUTION INTRAVENOUS at 10:24

## 2024-09-24 RX ADMIN — DEXAMETHASONE SODIUM PHOSPHATE 20 MG: 4 INJECTION, SOLUTION INTRA-ARTICULAR; INTRALESIONAL; INTRAMUSCULAR; INTRAVENOUS; SOFT TISSUE at 10:43

## 2024-09-24 RX ADMIN — SODIUM CHLORIDE 20 ML/HR: 9 INJECTION, SOLUTION INTRAVENOUS at 10:22

## 2024-09-24 RX ADMIN — PACLITAXEL 250 MG: 6 INJECTION, SOLUTION INTRAVENOUS at 11:46

## 2024-09-24 RX ADMIN — Medication 10 ML: at 16:13

## 2024-09-24 RX ADMIN — FAMOTIDINE 20 MG: 10 INJECTION INTRAVENOUS at 10:22

## 2024-10-14 ENCOUNTER — INFUSION (OUTPATIENT)
Dept: ONCOLOGY | Facility: HOSPITAL | Age: 49
End: 2024-10-14
Payer: COMMERCIAL

## 2024-10-14 ENCOUNTER — OFFICE VISIT (OUTPATIENT)
Dept: ONCOLOGY | Facility: CLINIC | Age: 49
End: 2024-10-14
Payer: COMMERCIAL

## 2024-10-14 ENCOUNTER — LAB (OUTPATIENT)
Dept: ONCOLOGY | Facility: HOSPITAL | Age: 49
End: 2024-10-14
Payer: COMMERCIAL

## 2024-10-14 VITALS
SYSTOLIC BLOOD PRESSURE: 147 MMHG | DIASTOLIC BLOOD PRESSURE: 71 MMHG | BODY MASS INDEX: 22.79 KG/M2 | WEIGHT: 154.3 LBS | RESPIRATION RATE: 18 BRPM | OXYGEN SATURATION: 98 % | TEMPERATURE: 97.1 F | HEART RATE: 117 BPM

## 2024-10-14 VITALS
TEMPERATURE: 97.1 F | HEART RATE: 117 BPM | DIASTOLIC BLOOD PRESSURE: 71 MMHG | SYSTOLIC BLOOD PRESSURE: 147 MMHG | BODY MASS INDEX: 22.79 KG/M2 | WEIGHT: 154.3 LBS | RESPIRATION RATE: 18 BRPM | OXYGEN SATURATION: 98 %

## 2024-10-14 DIAGNOSIS — C56.3 MALIGNANT NEOPLASM OF BOTH OVARIES: Primary | ICD-10-CM

## 2024-10-14 DIAGNOSIS — Z95.828 PORT-A-CATH IN PLACE: ICD-10-CM

## 2024-10-14 DIAGNOSIS — G62.9 NEUROPATHY: ICD-10-CM

## 2024-10-14 DIAGNOSIS — C56.3 MALIGNANT NEOPLASM OF BOTH OVARIES: ICD-10-CM

## 2024-10-14 LAB
ALBUMIN SERPL-MCNC: 4.3 G/DL (ref 3.5–5.2)
ALBUMIN/GLOB SERPL: 1.5 G/DL
ALP SERPL-CCNC: 125 U/L (ref 39–117)
ALT SERPL W P-5'-P-CCNC: 41 U/L (ref 1–33)
ANION GAP SERPL CALCULATED.3IONS-SCNC: 12.6 MMOL/L (ref 5–15)
AST SERPL-CCNC: 29 U/L (ref 1–32)
BASOPHILS # BLD AUTO: 0.04 10*3/MM3 (ref 0–0.2)
BASOPHILS NFR BLD AUTO: 0.7 % (ref 0–1.5)
BILIRUB SERPL-MCNC: 0.3 MG/DL (ref 0–1.2)
BUN SERPL-MCNC: 10 MG/DL (ref 6–20)
BUN/CREAT SERPL: 13.7 (ref 7–25)
CALCIUM SPEC-SCNC: 9.5 MG/DL (ref 8.6–10.5)
CANCER AG125 SERPL QL: 8.3 U/ML (ref 0–38.1)
CHLORIDE SERPL-SCNC: 104 MMOL/L (ref 98–107)
CO2 SERPL-SCNC: 24.4 MMOL/L (ref 22–29)
CREAT SERPL-MCNC: 0.73 MG/DL (ref 0.57–1)
DEPRECATED RDW RBC AUTO: 57.5 FL (ref 37–54)
EGFRCR SERPLBLD CKD-EPI 2021: 100.3 ML/MIN/1.73
EOSINOPHIL # BLD AUTO: 0.05 10*3/MM3 (ref 0–0.4)
EOSINOPHIL NFR BLD AUTO: 0.9 % (ref 0.3–6.2)
ERYTHROCYTE [DISTWIDTH] IN BLOOD BY AUTOMATED COUNT: 15.9 % (ref 12.3–15.4)
GLOBULIN UR ELPH-MCNC: 2.9 GM/DL
GLUCOSE SERPL-MCNC: 143 MG/DL (ref 65–99)
HCT VFR BLD AUTO: 35.4 % (ref 34–46.6)
HGB BLD-MCNC: 11.9 G/DL (ref 12–15.9)
IMM GRANULOCYTES # BLD AUTO: 0.04 10*3/MM3 (ref 0–0.05)
IMM GRANULOCYTES NFR BLD AUTO: 0.7 % (ref 0–0.5)
LYMPHOCYTES # BLD AUTO: 1.18 10*3/MM3 (ref 0.7–3.1)
LYMPHOCYTES NFR BLD AUTO: 20.9 % (ref 19.6–45.3)
MCH RBC QN AUTO: 32.8 PG (ref 26.6–33)
MCHC RBC AUTO-ENTMCNC: 33.6 G/DL (ref 31.5–35.7)
MCV RBC AUTO: 97.5 FL (ref 79–97)
MONOCYTES # BLD AUTO: 0.69 10*3/MM3 (ref 0.1–0.9)
MONOCYTES NFR BLD AUTO: 12.2 % (ref 5–12)
NEUTROPHILS NFR BLD AUTO: 3.65 10*3/MM3 (ref 1.7–7)
NEUTROPHILS NFR BLD AUTO: 64.6 % (ref 42.7–76)
NRBC BLD AUTO-RTO: 0 /100 WBC (ref 0–0.2)
PLATELET # BLD AUTO: 282 10*3/MM3 (ref 140–450)
PMV BLD AUTO: 9.1 FL (ref 6–12)
POTASSIUM SERPL-SCNC: 3.7 MMOL/L (ref 3.5–5.2)
PROT SERPL-MCNC: 7.2 G/DL (ref 6–8.5)
RBC # BLD AUTO: 3.63 10*6/MM3 (ref 3.77–5.28)
SODIUM SERPL-SCNC: 141 MMOL/L (ref 136–145)
WBC NRBC COR # BLD AUTO: 5.65 10*3/MM3 (ref 3.4–10.8)

## 2024-10-14 PROCEDURE — 96415 CHEMO IV INFUSION ADDL HR: CPT

## 2024-10-14 PROCEDURE — 1126F AMNT PAIN NOTED NONE PRSNT: CPT | Performed by: NURSE PRACTITIONER

## 2024-10-14 PROCEDURE — 25010000002 HEPARIN LOCK FLUSH PER 10 UNITS

## 2024-10-14 PROCEDURE — 96375 TX/PRO/DX INJ NEW DRUG ADDON: CPT

## 2024-10-14 PROCEDURE — 96417 CHEMO IV INFUS EACH ADDL SEQ: CPT

## 2024-10-14 PROCEDURE — 25810000003 SODIUM CHLORIDE 0.9 % SOLUTION 500 ML FLEX CONT: Performed by: INTERNAL MEDICINE

## 2024-10-14 PROCEDURE — 85025 COMPLETE CBC W/AUTO DIFF WBC: CPT

## 2024-10-14 PROCEDURE — 96413 CHEMO IV INFUSION 1 HR: CPT

## 2024-10-14 PROCEDURE — 25010000002 PALONOSETRON 0.25 MG/5ML SOLUTION PREFILLED SYRINGE: Performed by: INTERNAL MEDICINE

## 2024-10-14 PROCEDURE — 99214 OFFICE O/P EST MOD 30 MIN: CPT | Performed by: NURSE PRACTITIONER

## 2024-10-14 PROCEDURE — 25010000002 CARBOPLATIN PER 50 MG: Performed by: INTERNAL MEDICINE

## 2024-10-14 PROCEDURE — 96367 TX/PROPH/DG ADDL SEQ IV INF: CPT

## 2024-10-14 PROCEDURE — 96377 APPLICATON ON-BODY INJECTOR: CPT

## 2024-10-14 PROCEDURE — 25010000002 DEXAMETHASONE SODIUM PHOSPHATE 20 MG/5ML SOLUTION: Performed by: INTERNAL MEDICINE

## 2024-10-14 PROCEDURE — 86304 IMMUNOASSAY TUMOR CA 125: CPT

## 2024-10-14 PROCEDURE — 80053 COMPREHEN METABOLIC PANEL: CPT

## 2024-10-14 PROCEDURE — 25010000002 DIPHENHYDRAMINE PER 50 MG: Performed by: INTERNAL MEDICINE

## 2024-10-14 PROCEDURE — 25010000002 FOSAPREPITANT PER 1 MG: Performed by: INTERNAL MEDICINE

## 2024-10-14 PROCEDURE — 1160F RVW MEDS BY RX/DR IN RCRD: CPT | Performed by: NURSE PRACTITIONER

## 2024-10-14 PROCEDURE — 25010000002 PACLITAXEL PER 1 MG: Performed by: INTERNAL MEDICINE

## 2024-10-14 PROCEDURE — 1159F MED LIST DOCD IN RCRD: CPT | Performed by: NURSE PRACTITIONER

## 2024-10-14 PROCEDURE — 25810000003 SODIUM CHLORIDE 0.9 % SOLUTION 250 ML FLEX CONT: Performed by: INTERNAL MEDICINE

## 2024-10-14 PROCEDURE — 25010000002 PEGFILGRASTIM 6 MG/0.6ML PREFILLED SYRINGE KIT: Performed by: INTERNAL MEDICINE

## 2024-10-14 RX ORDER — SODIUM CHLORIDE 0.9 % (FLUSH) 0.9 %
10 SYRINGE (ML) INJECTION AS NEEDED
OUTPATIENT
Start: 2024-10-14

## 2024-10-14 RX ORDER — SODIUM CHLORIDE 9 MG/ML
20 INJECTION, SOLUTION INTRAVENOUS ONCE
Status: DISCONTINUED | OUTPATIENT
Start: 2024-10-14 | End: 2024-10-14 | Stop reason: RX

## 2024-10-14 RX ORDER — FAMOTIDINE 10 MG/ML
20 INJECTION, SOLUTION INTRAVENOUS ONCE
Status: COMPLETED | OUTPATIENT
Start: 2024-10-14 | End: 2024-10-14

## 2024-10-14 RX ORDER — SODIUM CHLORIDE 0.9 % (FLUSH) 0.9 %
10 SYRINGE (ML) INJECTION AS NEEDED
Status: DISCONTINUED | OUTPATIENT
Start: 2024-10-14 | End: 2024-10-14 | Stop reason: HOSPADM

## 2024-10-14 RX ORDER — PALONOSETRON 0.05 MG/ML
0.25 INJECTION, SOLUTION INTRAVENOUS ONCE
Status: COMPLETED | OUTPATIENT
Start: 2024-10-14 | End: 2024-10-14

## 2024-10-14 RX ORDER — HEPARIN SODIUM (PORCINE) LOCK FLUSH IV SOLN 100 UNIT/ML 100 UNIT/ML
500 SOLUTION INTRAVENOUS AS NEEDED
OUTPATIENT
Start: 2024-10-14

## 2024-10-14 RX ORDER — HEPARIN SODIUM (PORCINE) LOCK FLUSH IV SOLN 100 UNIT/ML 100 UNIT/ML
500 SOLUTION INTRAVENOUS AS NEEDED
Status: DISCONTINUED | OUTPATIENT
Start: 2024-10-14 | End: 2024-10-14 | Stop reason: HOSPADM

## 2024-10-14 RX ORDER — IBUPROFEN 800 MG/1
800 TABLET, FILM COATED ORAL EVERY 8 HOURS PRN
Qty: 30 TABLET | Refills: 1 | Status: SHIPPED | OUTPATIENT
Start: 2024-10-14

## 2024-10-14 RX ADMIN — PACLITAXEL 250 MG: 6 INJECTION, SOLUTION, CONCENTRATE INTRAVENOUS at 10:19

## 2024-10-14 RX ADMIN — FOSAPREPITANT DIMEGLUMINE 150 MG: 150 INJECTION, POWDER, LYOPHILIZED, FOR SOLUTION INTRAVENOUS at 09:28

## 2024-10-14 RX ADMIN — FAMOTIDINE 20 MG: 10 INJECTION INTRAVENOUS at 08:54

## 2024-10-14 RX ADMIN — HEPARIN 500 UNITS: 100 SYRINGE at 14:21

## 2024-10-14 RX ADMIN — CARBOPLATIN 510 MG: 10 INJECTION, SOLUTION INTRAVENOUS at 13:37

## 2024-10-14 RX ADMIN — DIPHENHYDRAMINE HYDROCHLORIDE 50 MG: 50 INJECTION, SOLUTION INTRAMUSCULAR; INTRAVENOUS at 08:55

## 2024-10-14 RX ADMIN — PALONOSETRON 0.25 MG: 0.25 INJECTION, SOLUTION INTRAVENOUS at 08:53

## 2024-10-14 RX ADMIN — Medication 10 ML: at 14:21

## 2024-10-14 RX ADMIN — PEGFILGRASTIM 6 MG: KIT SUBCUTANEOUS at 14:20

## 2024-10-14 RX ADMIN — DEXAMETHASONE SODIUM PHOSPHATE 20 MG: 4 INJECTION, SOLUTION INTRA-ARTICULAR; INTRALESIONAL; INTRAMUSCULAR; INTRAVENOUS; SOFT TISSUE at 09:08

## 2024-10-14 NOTE — PROGRESS NOTES
Subjective     Date: 10/14/2024    Referring Provider  No ref. provider found    Chief Complaint  Malignant neoplasm of both ovaries      Cancer Staging   FIGO Stage II (cT2, cN0, cM0)  Stage IC (pT1c2, pN0, cM0)      Subjective          Jie Aragon is a 49 y.o. female who presents today to [facility] for follow up.     HPI:   49 y.o. 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     Oncology history:  April 2024: Palpated right lower abdominal mass while applying lotion in tanning salon. This was associated with abdominal pain, bloating, and nausea. Care was delayed because patient did not have insurance at the time. Eventually went to PCP who ordered CT imaging of the abdomen.  May 8 2024: CT imaging R mass 17 x 13 cm and left 11x5 cm with septations, likely ovarian.  65  May 20 2024: Seen by Dr. Dubois for consultation.   May 23 2024: Underwent exploratory laparotomy, total abdominal hysterectomy, bilateral salpingo oophorectomy, omentectomy, appendectomy. Final pathology with left ovary with high grade serous carcinoma 9.5 cm confined to the ovary with no surface involvement identified, Right ovary with involved by high grade serous carcinoma, confined to the ovary. Fallopian tube with no change. Lymph nodes and omentum negative for malignancy. Uterus with high grade squamous intraepithelial lesion ALEX-3.   Final pathology pT1c and pN0 (ovarian ruptured at time of surgery).     She presents today with her daughter in law, Donna. Ms. Aragon is healing well without any post op complications. She is a smoker, smokes 1 pack/day X 30+ years, denies alcohol or drug use. Has family history of paternal grandfather with brain tumor, half brother with leukemia, and son with testicular cancer. She has three children 2 daughters and 1 son ranging from 26-31.      Treatment History:          Interval History 07/22/2024   Ms. Aragon presents today prior to C2 of adjuvant carbo/taxol.   [facility] genetic testing is negative for BRCA1/BRCA2.   She reports no NVDC with C1, did have significant myalgia with filgrastim injection which subsided after discontinuation.   Lost all hair after C1. Was emotional, currently on sertaline 25 mg QD with good tolerance. Denies constant neuropathy, admits to having intermittent numbness of her fingers.    Interval History 08/12/2024  Ms. Aragon presents today prior to cycle 3 Carboplatin/Taxol. She reports following cycle 2 she struggled with nausea and vomiting for about three days despite taking zofran and compazine. At present, this has resolved and her appetite has improved. She reports that she is hydrating well. She has intermittent constipation which is relieved with stool softeners and Miralax as needed. Neuropathy is stable. She also continues to report significant bony pain with the use of Claritin daily. She is without any other complaints or concerns at this time.     Interval History 09/03/2024   Ms. Aragon presents prior to C4 of carbo/taxol. Presented to [facility] ED due to COVID-19 with admission from 8/25-27. Reports persistent fatigue, denies any upper respiratory symptoms. Was given Rocephin injection by PCP last week due to sore throat. Reports neuropathy, persistently on b/l hands. No previous NVDC. Currently on gabapentin 100 mg QHS.     Interval History 09/24/2024   Ms. Aragon presents prior to C5 of carboplatin/taxol, accompanied by her son. She reports good tolerance to last cycle without NVDC. No worsened neuropathy, currently taking gabapentin 100 mg daily, recommended increasing to 3 times a day. Doing well with the anti depressant. Appetite is stable.     Interval History 10/14/2024  Ms. Aragon presents today prior to cycle 6 carboplatin/taxol, accompanied by her daughter. Overall, she reports that she has been  doing well. She is happy that today is her last scheduled cycle of chemotherapy. She reports a good appetite, stable weight. Denies any nausea/vomiting or diarrhea. She reports improvement in neuropathy with Neurontin 100 mg TID. She is without any other complaints at this time.     Objective     Objective     Allergy:   Allergies   Allergen Reactions    Amoxicillin Other (See Comments)     unknown    Penicillins Other (See Comments)     unknown        Current Medications:   Current Outpatient Medications   Medication Sig Dispense Refill    ibuprofen (ADVIL,MOTRIN) 800 MG tablet Take 1 tablet by mouth Every 8 (Eight) Hours As Needed for Mild Pain. 30 tablet 1    gabapentin (NEURONTIN) 100 MG capsule Take 1 capsule by mouth 3 Times a Day. 90 capsule 3    granisetron (SANCUSO) 3.1 MG/24HR Place 1 patch on the skin as directed by provider 1 (One) Time Per Week. 4 patch 0    lidocaine-prilocaine (EMLA) 2.5-2.5 % cream Apply to port-a-cath site 30 minutes prior to arrival at infusion center. Cover with plastic wrap. 30 g 1    loratadine (CLARITIN) 10 MG tablet Take 1 tablet by mouth weekly starting the day of growth factor injection. 30 tablet 5    OLANZapine (ZyPREXA) 5 MG tablet Take 1 tablet by mouth Every Night. Take nightly x 4 starting night of chemotherapy. 4 tablet 5    prochlorperazine (COMPAZINE) 10 MG tablet Take 1 tablet by mouth Every 6 (Six) Hours As Needed for Nausea or Vomiting. 30 tablet 1    pyridoxine (VITAMIN B-6) 50 MG tablet tablet Take 1 tablet by mouth Daily. 30 tablet 3    sertraline (Zoloft) 50 MG tablet Take 1 tablet by mouth Daily. 30 tablet 3     No current facility-administered medications for this visit.     Facility-Administered Medications Ordered in Other Visits   Medication Dose Route Frequency Provider Last Rate Last Admin    CARBOplatin (PARAPLATIN) 510 mg in sodium chloride 0.9 % 301 mL chemo IVPB  510 mg Intravenous Once Ros Vasquez MD        PACLitaxel (TAXOL) 250 mg in  sodium chloride 0.9 % 541.7 mL chemo IVPB  140 mg/m2 (Treatment Plan Recorded) Intravenous Once Ros Vasquez MD        pegfilgrastim (NEULASTA ONPRO) on-body injector 6 mg  6 mg Subcutaneous Once Ros Vasquez MD           Past Medical History:  Past Medical History:   Diagnosis Date    Change in vision     Multiple gestation     Ovarian cancer 05/24/2024    Wears dentures     UPPER AND LOWER       Past Surgical History:  Past Surgical History:   Procedure Laterality Date    APPENDECTOMY N/A 5/23/2024    Procedure: APPENDECTOMY;  Surgeon: Shanda Dubois MD;  Location:  [facility];  Service: Gynecology Oncology;  Laterality: N/A;    CYSTOSCOPY Bilateral 5/23/2024    Procedure: CYSTOSCOPY TEMPORARY PLACEMENT BILATERAL URETERAL CATHETER;  Surgeon: Shanda Dubois MD;  Location:  [facility];  Service: Gynecology Oncology;  Laterality: Bilateral;    EXPLORATORY LAPAROTOMY, TOTAL ABDOMINAL HYSTERECTOMY SALPINGO OOPHORECTOMY N/A 5/23/2024    Procedure: EXPLORATORY LAPAROTOMY, TOTAL ABDOMINAL HYSTERECTOMY BILATERAL SALPINGO OOPHORECTOMY, CANCER STAGING/ OPTIMAL DEBULKLING (R=0);  Surgeon: Shanda Dubois MD;  Location:  [facility];  Service: Gynecology Oncology;  Laterality: N/A;    OMENTECTOMY N/A 5/23/2024    Procedure: OMENTECTOMY;  Surgeon: Shanda Dubois MD;  Location:  [facility];  Service: Gynecology Oncology;  Laterality: N/A;    PORTACATH PLACEMENT Right 6/24/2024    Procedure: INSERTION OF PORTACATH;  Surgeon: Yakov Chung MD;  Location:  [facility];  Service: General;  Laterality: Right;    TUBAL ABDOMINAL LIGATION         Social History:  Social History     Socioeconomic History    Marital status: Single   Tobacco Use    Smoking status: Every Day     Current packs/day: 1.00     Average packs/day: 1 pack/day for 15.0 years (15.0 ttl pk-yrs)     Types: Cigarettes     Passive exposure: Current    Smokeless tobacco: Never   Vaping Use    Vaping status: Never Used   Substance and Sexual Activity     Alcohol use: Never    Drug use: Never    Sexual activity: Yes     Partners: Male     Birth control/protection: I.U.D., Tubal ligation     Jie Aragon  reports that she has been smoking cigarettes. She has a 15 pack-year smoking history. She has been exposed to tobacco smoke. She has never used smokeless tobacco. I have educated her on the risk of diseases from using tobacco products such as cancer, COPD, and heart disease.     I advised her to quit and she is not willing to quit.    I spent 3  minutes counseling the patient.         Family History:  Family History   Problem Relation Age of Onset    Stroke Father     Hypertension Father     Heart attack Father     Deep vein thrombosis Mother     Leukemia Brother     Testicular cancer Son        Review of Systems:  Review of Systems   All other systems reviewed and are negative.      Vital Signs:   /71   Pulse 117   Temp 97.1 °F (36.2 °C) (Temporal)   Resp 18   Wt 70 kg (154 lb 4.8 oz)   SpO2 98%   BMI 22.79 kg/m²      Physical Exam:  Physical Exam  Vitals reviewed.   Constitutional:       General: She is not in acute distress.     Appearance: Normal appearance. She is not ill-appearing.   HENT:      Head: Normocephalic and atraumatic.      Mouth/Throat:      Mouth: Mucous membranes are moist.      Pharynx: Oropharynx is clear.   Eyes:      Conjunctiva/sclera: Conjunctivae normal.      Pupils: Pupils are equal, round, and reactive to light.   Cardiovascular:      Rate and Rhythm: Normal rate and regular rhythm.      Heart sounds: No murmur heard.  Pulmonary:      Effort: Pulmonary effort is normal. No respiratory distress.      Breath sounds: Normal breath sounds. No wheezing.   Chest:      Comments: +R chest port  Abdominal:      General: Abdomen is flat. Bowel sounds are normal. There is no distension.      Palpations: Abdomen is soft. There is no mass.      Tenderness: There is no abdominal tenderness. There is no guarding.   Musculoskeletal:          General: No swelling. Normal range of motion.      Cervical back: Normal range of motion.   Lymphadenopathy:      Cervical: No cervical adenopathy.   Skin:     General: Skin is warm and dry.   Neurological:      General: No focal deficit present.      Mental Status: She is alert and oriented to person, place, and time. Mental status is at baseline.   Psychiatric:         Mood and Affect: Mood normal.         PHQ-9 Score  PHQ-9 Total Score:       Pain Score  Vitals:    10/14/24 0801   BP: 147/71   Pulse: 117   Resp: 18   Temp: 97.1 °F (36.2 °C)   TempSrc: Temporal   SpO2: 98%   Weight: 70 kg (154 lb 4.8 oz)   PainSc: 0-No pain                         PAINSCOREQUALITYMETRIC:   Vitals:    10/14/24 0801   PainSc: 0-No pain                Lab Review  Lab Results   Component Value Date    WBC 5.65 10/14/2024    HGB 11.9 (L) 10/14/2024    HCT 35.4 10/14/2024    MCV 97.5 (H) 10/14/2024    RDW 15.9 (H) 10/14/2024     10/14/2024    NEUTRORELPCT 64.6 10/14/2024    LYMPHORELPCT 20.9 10/14/2024    MONORELPCT 12.2 (H) 10/14/2024    EOSRELPCT 0.9 10/14/2024    BASORELPCT 0.7 10/14/2024    NEUTROABS 3.65 10/14/2024    LYMPHSABS 1.18 10/14/2024     Lab Results   Component Value Date     10/14/2024    K 3.7 10/14/2024    CO2 24.4 10/14/2024     10/14/2024    BUN 10 10/14/2024    CREATININE 0.73 10/14/2024    GLUCOSE 143 (H) 10/14/2024    CALCIUM 9.5 10/14/2024    ALKPHOS 125 (H) 10/14/2024    AST 29 10/14/2024    ALT 41 (H) 10/14/2024    BILITOT 0.3 10/14/2024    ALBUMIN 4.3 10/14/2024    PROTEINTOT 7.2 10/14/2024    MG 2.1 08/27/2024    PHOS 3.6 08/27/2024         Radiology Results  CT Angiogram Chest Pulmonary Embolism (05/26/2024 07:45)   FINDINGS:  Contrast bolus timing is satisfactory to evaluate for pulmonary  embolism.  Pulmonary arteries: normal.  No pulmonary arterial filling defect is  seen.  Heart and pericardium: normal.  Aorta: normal.  Esophagus: normal.  Lungs and airways: Linear bibasilar  atelectasis.  Peripheral emphysema  and cysts..  Pleura: normal.  Lymph nodes: normal.  Musculoskeletal and chest wall: Free air in the abdomen is postsurgical  most likely  Other: n/a     IMPRESSION:  NEGATIVE FOR PULMONARY EMBOLISM.  LINEAR BIBASILAR ATELECTASIS.  FREE AIR IN THE ABDOMEN IS LIKELY POSTSURGICAL    No acute cardiopulmonary process.    Scanned Imaging   CT Abdomen/Pelvis with Contrast (05/08/2024)           Pathology:   05/23/2024              Genetic Testing          Assessment / Plan         Assessment and Plan   Jie Aragon is a 49 y.o. 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 PD-L1 <1%   Cancer Staging. Negative BRCA1/BRCA2   FIGO Stage II (cT2, cN0, cM0)  Stage IC (pT1c2, pN0, cM0)  -patient presents with palpation of RLQ abdominal mass, associated with abdominal pain, bloating, and nausea for ~1 months prior to CT A/P. CT A/P with R mass 17 cm and L mass 11 cm c/f ovarian cancer. : 65  -5/2024 Underwent exploratory laparotomy, total abdominal hysterectomy, bilateral salpingo oophorectomy, omentectomy, appendectomy by Dr. Dbuois at [facility]. Final pathology with left ovary with high grade serous carcinoma 9.5 cm confined to the ovary with no surface involvement identified, Right ovary with involved by high grade serous carcinoma, confined to the ovary. Fallopian tube with no change. Lymph nodes and omentum negative for malignancy. Uterus with high grade squamous intraepithelial lesion ALEX-3.   Final pathology pT1c and pN0 (ovarian ruptured at time of surgery).   -We reviewed the NCCN guidelines which indicate adjuvant chemotherapy with carboplatin AUC 5-6 D1 and paclitaxel 175 mg/m2 D1 every 21 days for 6 cycles.  -C2 7/22- tolerated well  -C4 9/3- proceed today with 20% dose reduction of paclitaxel and 40% dose reduction of carboplatin (to AUC 4)  -C5 9/24- tolerated well with 20% dose reduction as above  -C6 10/14-proceed today with with 20% dose  reduction    2. Malignancy associated pain  -Currently denies     3. Nutrition   - Recommend high calories/protein diet during the treatment     4. Chemotherapy induced neuropathy (Grade 1)  - Admits to persistent neuropathy of b/l fingers  - Started on B6 and gabapentin 100 mg TID, advised to continue  - Carboplatin dose decreased to AUC 4 and paclitaxel by 20%     5. Depression   - Started patient on sertraline 25 mg QD with good tolerance, increased to 50 mg QD   - Reports improvement in mood    6. Nicotine use   - Recommend smoking cessation counseling, patient declined    7. Surveillance (per NCCN)  -  Visits every 2-4 months for 2 years, then 3-6 months for 3 years, then annually after 5 years  - Physical exam and pelvic exam as clinically indicated   - CT chest/abdomen/pelvis as clinically indicated       Discussed possible differential diagnoses, testing, treatment, recommended non-pharmacological interventions, risks, warning signs to monitor for that would indicate need for follow-up in clinic or ER. If no improvement with these regimens or you have new or worsening symptoms follow-up. Patient verbalizes understanding and agreement with plan of care. Denies further needs or concerns.     Patient was given instructions and counseling regarding her condition and for health maintenance advised.       All questions were answered to her satisfaction.    BMI is within normal parameters. No other follow-up for BMI required.         ACO / ALEX/Other  Quality measures  -  Jie Aragon did not receive 2024 flu vaccine.  This was recommended.  -  Jie Aragon reports a pain score of 0.   -  Current outpatient and discharge medications have been reconciled for the patient.  Reviewed by: ESTHER Pickens                Meds ordered during this visit  New Medications Ordered This Visit   Medications    ibuprofen (ADVIL,MOTRIN) 800 MG tablet     Sig: Take 1 tablet by mouth Every 8 (Eight)  Hours As Needed for Mild Pain.     Dispense:  30 tablet     Refill:  1       Visit Diagnoses    ICD-10-CM ICD-9-CM   1. Malignant neoplasm of both ovaries  C56.3 183.0   2. Neuropathy  G62.9 355.9               Follow Up   As scheduled with Dr. Vasquez.        This document has been electronically signed by ESTHER Nickerson   October 14, 2024 09:58 EDT

## 2024-11-01 ENCOUNTER — OFFICE VISIT (OUTPATIENT)
Dept: GYNECOLOGIC ONCOLOGY | Facility: CLINIC | Age: 49
End: 2024-11-01
Payer: COMMERCIAL

## 2024-11-01 VITALS
DIASTOLIC BLOOD PRESSURE: 65 MMHG | HEART RATE: 90 BPM | HEIGHT: 69 IN | BODY MASS INDEX: 23.36 KG/M2 | OXYGEN SATURATION: 95 % | WEIGHT: 157.7 LBS | SYSTOLIC BLOOD PRESSURE: 127 MMHG | TEMPERATURE: 97.5 F | RESPIRATION RATE: 17 BRPM

## 2024-11-01 DIAGNOSIS — T45.1X5A CHEMOTHERAPY-INDUCED NEUROPATHY: ICD-10-CM

## 2024-11-01 DIAGNOSIS — Z12.31 BREAST CANCER SCREENING BY MAMMOGRAM: ICD-10-CM

## 2024-11-01 DIAGNOSIS — C56.2 MALIGNANT NEOPLASM OF LEFT OVARY: Primary | ICD-10-CM

## 2024-11-01 DIAGNOSIS — G62.0 CHEMOTHERAPY-INDUCED NEUROPATHY: ICD-10-CM

## 2024-11-01 PROBLEM — Z72.0 TOBACCO ABUSE: Status: ACTIVE | Noted: 2024-11-01

## 2024-11-01 NOTE — PROGRESS NOTES
Jie Aragon  [id number]  1975      Reason for visit: Ovarian cancer    History of present illness:  The patient is a 49 y.o. year old female who presents today for treatment and evaluation of the above issues.    The patient is now status post 6 cycles of combined carboplatin and paclitaxel in [place].  For my review of her notes, it appears that she tolerated treatment well.  Today, she reports recent sinus infection.  CT angiogram 8/2024 d/t COVID.  Still some fatigue, neuropathy.  Seems to be getting better.  Still smoking 1 PPD.  Mammogram - long time ago (lump).  No colonoscopy ever.     Oncologic History:  Oncology/Hematology History   Ovarian cancer   5/23/2024 Cancer Staged    Staging form: Ovary, Fallopian Tube, And Primary Peritoneal Carcinoma, AJCC 8th Edition  - Clinical stage from 5/23/2024: FIGO Stage II (cT2, cN0, cM0) - Signed by Shanda Dubois MD on 6/13/2024  Staging comments: At the time of laparotomy, there is a massively enlarged right ovary that was adhered to the right pelvis.   Both ovarian masses ruptured at the time of surgery.  Mainly fluid was noted although there was some tumor visualized at the right adnexa.      5/23/2024 Cancer Staged    Staging form: Ovary, Fallopian Tube, And Primary Peritoneal Carcinoma, AJCC 8th Edition  - Pathologic stage from 5/23/2024: Stage IC (pT1c2, pN0, cM0) - Signed by Shanda Dubois MD on 6/13/2024 5/24/2024 Initial Diagnosis    Ovarian cancer     6/18/2024 Genetic Testing    34 gene Ambry panel performed  No genetic mutations noted     7/1/2024 -  Chemotherapy    OP OVARIAN PACLitaxel / CARBOplatin AUC=6 (Q21D)           Past Medical History:   Diagnosis Date    Change in vision     Multiple gestation     Ovarian cancer 05/24/2024    Wears dentures     UPPER AND LOWER       Past Surgical History:   Procedure Laterality Date    APPENDECTOMY N/A 5/23/2024    Procedure: APPENDECTOMY;  Surgeon: Shanda Dubois MD;  Location:   [facility];  Service: Gynecology Oncology;  Laterality: N/A;    CYSTOSCOPY Bilateral 5/23/2024    Procedure: CYSTOSCOPY TEMPORARY PLACEMENT BILATERAL URETERAL CATHETER;  Surgeon: Shanda Dubois MD;  Location:  [facility];  Service: Gynecology Oncology;  Laterality: Bilateral;    EXPLORATORY LAPAROTOMY, TOTAL ABDOMINAL HYSTERECTOMY SALPINGO OOPHORECTOMY N/A 5/23/2024    Procedure: EXPLORATORY LAPAROTOMY, TOTAL ABDOMINAL HYSTERECTOMY BILATERAL SALPINGO OOPHORECTOMY, CANCER STAGING/ OPTIMAL DEBULKLING (R=0);  Surgeon: Shanda Dubois MD;  Location:  [facility];  Service: Gynecology Oncology;  Laterality: N/A;    OMENTECTOMY N/A 5/23/2024    Procedure: OMENTECTOMY;  Surgeon: Shanda Dubois MD;  Location:  [facility];  Service: Gynecology Oncology;  Laterality: N/A;    PORTACATH PLACEMENT Right 6/24/2024    Procedure: INSERTION OF PORTACATH;  Surgeon: Yakov Chung MD;  Location:  [facility];  Service: General;  Laterality: Right;    TUBAL ABDOMINAL LIGATION         MEDICATIONS:    Current Outpatient Medications:     gabapentin (NEURONTIN) 100 MG capsule, Take 1 capsule by mouth 3 Times a Day., Disp: 90 capsule, Rfl: 3    granisetron (SANCUSO) 3.1 MG/24HR, Place 1 patch on the skin as directed by provider 1 (One) Time Per Week., Disp: 4 patch, Rfl: 0    ibuprofen (ADVIL,MOTRIN) 800 MG tablet, Take 1 tablet by mouth Every 8 (Eight) Hours As Needed for Mild Pain., Disp: 30 tablet, Rfl: 1    lidocaine-prilocaine (EMLA) 2.5-2.5 % cream, Apply to port-a-cath site 30 minutes prior to arrival at infusion center. Cover with plastic wrap., Disp: 30 g, Rfl: 1    loratadine (CLARITIN) 10 MG tablet, Take 1 tablet by mouth weekly starting the day of growth factor injection., Disp: 30 tablet, Rfl: 5    OLANZapine (ZyPREXA) 5 MG tablet, Take 1 tablet by mouth Every Night. Take nightly x 4 starting night of chemotherapy., Disp: 4 tablet, Rfl: 5    prochlorperazine (COMPAZINE) 10 MG tablet, Take 1 tablet by mouth Every 6 (Six) Hours  "As Needed for Nausea or Vomiting., Disp: 30 tablet, Rfl: 1    pyridoxine (VITAMIN B-6) 50 MG tablet tablet, Take 1 tablet by mouth Daily., Disp: 30 tablet, Rfl: 3    sertraline (Zoloft) 50 MG tablet, Take 1 tablet by mouth Daily., Disp: 30 tablet, Rfl: 3     Allergies:  is allergic to amoxicillin and penicillins.    Social History:   Social History     Socioeconomic History    Marital status: Single   Tobacco Use    Smoking status: Every Day     Current packs/day: 1.00     Average packs/day: 1 pack/day for 15.0 years (15.0 ttl pk-yrs)     Types: Cigarettes     Passive exposure: Current    Smokeless tobacco: Never   Vaping Use    Vaping status: Never Used   Substance and Sexual Activity    Alcohol use: Never    Drug use: Never    Sexual activity: Yes     Partners: Male     Birth control/protection: I.U.D., Tubal ligation       Family History:    Family History   Problem Relation Age of Onset    Stroke Father     Hypertension Father     Heart attack Father     Deep vein thrombosis Mother     Leukemia Brother     Testicular cancer Son        Health Maintenance:    Health Maintenance   Topic Date Due    MAMMOGRAM  Never done    COLORECTAL CANCER SCREENING  Never done    Pneumococcal Vaccine 0-64 (1 of 2 - PCV) Never done    TDAP/TD VACCINES (2 - Td or Tdap) 08/28/2016    HEPATITIS C SCREENING  Never done    ANNUAL PHYSICAL  Never done    ZOSTER VACCINE (1 of 2) Never done    INFLUENZA VACCINE  Never done    COVID-19 Vaccine (1 - 2023-24 season) Never done       Review of Systems:  Please refer to history of present illness.  Review of systems otherwise negative.  Physical Exam:  Vitals:    11/01/24 0846   BP: 127/65   Pulse: 90   Resp: 17   Temp: 97.5 °F (36.4 °C)   TempSrc: Temporal   SpO2: 95%   Weight: 71.5 kg (157 lb 11.2 oz)   Height: 175.3 cm (69.02\")   PainSc: 0-No pain     Body mass index is 23.28 kg/m².  Wt Readings from Last 3 Encounters:   11/01/24 71.5 kg (157 lb 11.2 oz)   10/14/24 70 kg (154 lb 4.8 oz) "   10/14/24 70 kg (154 lb 4.8 oz)     GENERAL: Alert, well-appearing female appearing her stated age who is in no apparent distress.   HEENT: Sclera anicteric. Head normocephalic, atraumatic. Mucus membranes moist.   NECK: Trachea midline, supple, without masses.  No thyromegaly.   BREASTS: Deferred  CARDIOVASCULAR: Normal rate, regular rhythm, no murmurs, rubs, or gallops.  No peripheral edema.  RESPIRATORY: Clear to auscultation bilaterally, normal respiratory effort  BACK:  No CVA tenderness, no vertebral tenderness on palpation  GASTROINTESTINAL:  Abdomen is soft, non-tender, non-distended, no rebound or guarding, no masses, or hernias. No HSM.  Incisions well healed.  SKIN:  Warm, dry, well-perfused.  All visible areas intact.  No rashes, lesions, ulcers.  PSYCHIATRIC: AO x3, with appropriate affect, normal thought processes.  NEUROLOGIC: No focal deficits.  Moves extremities well.  MUSCULOSKELETAL: Normal gait and station.   EXTREMITIES:   No cyanosis, clubbing, symmetric.  LYMPHATICS:  No cervical or inguinal adenopathy noted.     PELVIC exam:  External genitalia are free from lesion. On speculum examination, the vaginal cuff was intact and no lesions were appreciated.  On bimanual examination, no fullness was appreciated.  Uterus, cervix and adnexa were absent.  There was no significant tenderness.  Rectovaginal exam was deferred.    ECOG PS 0    PROCEDURES:  None    Diagnostic Data:    CT Angiogram Chest Pulmonary Embolism    Result Date: 8/25/2024  Impression:  1.  Normal heart size. 2.  No thoracic aortic aneurysm or dissection. 3.  No pulmonary embolus. 4.  No bronchial inflammation. 5.  No mediastinal or hilar adenopathy. 6.  No features to suggest multifocal pneumonia.   This report was finalized on 8/25/2024 9:44 PM by Manuel Nino MD.      XR Chest 1 View    Result Date: 8/25/2024  No acute cardiopulmonary process.   This report was finalized on 8/25/2024 5:22 PM by Alex Pallas, DO.        Lab  "Results   Component Value Date    WBC 5.65 10/14/2024    HGB 11.9 (L) 10/14/2024    HCT 35.4 10/14/2024    MCV 97.5 (H) 10/14/2024     10/14/2024    NEUTROABS 3.65 10/14/2024    GLUCOSE 143 (H) 10/14/2024    BUN 10 10/14/2024    CREATININE 0.73 10/14/2024     10/14/2024    K 3.7 10/14/2024     10/14/2024    CO2 24.4 10/14/2024    MG 2.1 08/27/2024    PHOS 3.6 08/27/2024    CALCIUM 9.5 10/14/2024    ALBUMIN 4.3 10/14/2024    AST 29 10/14/2024    ALT 41 (H) 10/14/2024    BILITOT 0.3 10/14/2024     Lab Results   Component Value Date    TSH 3.860 05/26/2024     No results found for: \"FT4\"  Lab Results   Component Value Date     8.3 10/14/2024     9.3 09/24/2024     8.8 09/03/2024     9.7 08/12/2024     12.1 07/22/2024     12.1 07/01/2024     21.4 06/21/2024       Assessment & Plan   This is a 49 y.o. woman with history of ovarian cancer now status post completion of treatment  Encounter Diagnoses   Name Primary?    Malignant neoplasm of left ovary Yes    Breast cancer screening by mammogram     Chemotherapy-induced neuropathy      I think posttreatment scans are reasonable so we can have a new baseline to compare any future imaging to.  Although her  has been low, it was initially 66 and it may be worth following over time.  I will reach out to Dr. Vasquez and see how she would like to follow the patient.  I recommend alternating visits between [place] and here every 3 months.  Survivorship visit in 6 months here.  Patient was just seen here I think her appointment next week with Dr. Av acevedo should be moved out to 3 months.    Other issues include persistent chemotherapy-induced neuropathy.  We discussed the possibility of physical therapy should this continue to be an issue.  Recommended she discontinue smoking cigarettes.  CT scan of chest in August negative for malignancy.  Ordered mammogram and colonoscopy for screening and patient was agreeable.    Pain " assessment was performed today as a part of patient’s care.  For patients with pain related to surgery, gynecologic malignancy or cancer treatment, the plan is as noted in the assessment/plan.  For patients with pain not related to these issues, they are to seek any further needed care from a more appropriate provider, such as PCP.      Orders Placed This Encounter   Procedures    CT Abdomen Pelvis With Contrast     Standing Status:   Future     Standing Expiration Date:   11/1/2025     Scheduling Instructions:      Rikki please     Order Specific Question:   Will Oral Contrast be needed for this procedure?     Answer:   Yes     Order Specific Question:   Release to patient     Answer:   Routine Release [[id number]]     Order Specific Question:   Reason for Exam:     Answer:   ovarian cancer     Order Specific Question:   Patient Pregnant     Answer:   No     Order Specific Question:   Does this patient have a diabetic monitoring/medication delivering device on?     Answer:   No    Mammo Screening Digital Tomosynthesis Bilateral With CAD     Standing Status:   Future     Standing Expiration Date:   11/1/2025     Scheduling Instructions:      Rikki     Order Specific Question:   Reason for Exam:     Answer:   breast cancer screening     Order Specific Question:   Patient Pregnant     Answer:   No     Order Specific Question:   Release to patient     Answer:   Routine Release [[id number]]    Ambulatory Referral For Screening Colonoscopy     Referral Priority:   Routine     Referral Type:   Diagnostic Medical     Referral Reason:   Specialty Services Required     Number of Visits Requested:   1       FOLLOW UP: 6 months for survivorship with APRN    I spent 37 minutes caring for Jie on this date of service. This time includes time spent by me in the following activities: preparing for the visit, reviewing tests, performing a medically appropriate examination and/or evaluation, counseling and educating the  patient/family/caregiver, referring and communicating with other health care professionals, documenting information in the medical record, care coordination, ordering test(s), and ordering procedure(s)      Electronically Signed by: Shanda Dubois MD  Date: 11/1/2024

## 2024-11-07 ENCOUNTER — DOCUMENTATION (OUTPATIENT)
Dept: GASTROENTEROLOGY | Facility: CLINIC | Age: 49
End: 2024-11-07
Payer: COMMERCIAL

## 2024-11-07 DIAGNOSIS — Z12.11 ENCOUNTER FOR SCREENING FOR MALIGNANT NEOPLASM OF COLON: Primary | ICD-10-CM

## 2024-11-07 RX ORDER — POLYETHYLENE GLYCOL 3350 17 G/17G
510 POWDER, FOR SOLUTION ORAL ONCE
Qty: 510 G | Refills: 0 | Status: SHIPPED | OUTPATIENT
Start: 2024-11-07 | End: 2024-11-07

## 2024-11-07 RX ORDER — BISACODYL 5 MG/1
20 TABLET, DELAYED RELEASE ORAL ONCE
Qty: 4 TABLET | Refills: 0 | Status: SHIPPED | OUTPATIENT
Start: 2024-11-07 | End: 2024-11-07

## 2024-11-07 NOTE — PROGRESS NOTES
I had received a secure chat message from Dr. Smith  and patient wanted to schedule her procedure with him here in [place]. I called and left her a voicemail to please call me back.

## 2024-11-11 ENCOUNTER — APPOINTMENT (OUTPATIENT)
Dept: CT IMAGING | Facility: HOSPITAL | Age: 49
End: 2024-11-11
Payer: COMMERCIAL

## 2024-11-11 ENCOUNTER — HOSPITAL ENCOUNTER (EMERGENCY)
Facility: HOSPITAL | Age: 49
Discharge: HOME | End: 2024-11-11
Attending: EMERGENCY MEDICINE
Payer: COMMERCIAL

## 2024-11-11 VITALS
DIASTOLIC BLOOD PRESSURE: 74 MMHG | HEIGHT: 69 IN | HEART RATE: 89 BPM | SYSTOLIC BLOOD PRESSURE: 108 MMHG | WEIGHT: 157.63 LBS | OXYGEN SATURATION: 97 % | BODY MASS INDEX: 23.35 KG/M2 | RESPIRATION RATE: 20 BRPM | TEMPERATURE: 98.6 F

## 2024-11-11 DIAGNOSIS — J44.1 COPD WITH ACUTE EXACERBATION: Primary | ICD-10-CM

## 2024-11-11 LAB
ALBUMIN SERPL-MCNC: 4.4 G/DL (ref 3.5–5.2)
ALBUMIN/GLOB SERPL: 1.6 G/DL
ALP SERPL-CCNC: 117 U/L (ref 39–117)
ALT SERPL W P-5'-P-CCNC: 23 U/L (ref 1–33)
ANION GAP SERPL CALCULATED.3IONS-SCNC: 15 MMOL/L (ref 5–15)
AST SERPL-CCNC: 14 U/L (ref 1–32)
BASOPHILS # BLD AUTO: 0.03 10*3/MM3 (ref 0–0.2)
BASOPHILS NFR BLD AUTO: 0.3 % (ref 0–1.5)
BILIRUB SERPL-MCNC: 0.4 MG/DL (ref 0–1.2)
BUN SERPL-MCNC: 15 MG/DL (ref 6–20)
BUN/CREAT SERPL: 17.2 (ref 7–25)
CALCIUM SPEC-SCNC: 9.6 MG/DL (ref 8.6–10.5)
CHLORIDE SERPL-SCNC: 103 MMOL/L (ref 98–107)
CO2 SERPL-SCNC: 18 MMOL/L (ref 22–29)
CREAT SERPL-MCNC: 0.87 MG/DL (ref 0.57–1)
D DIMER PPP FEU-MCNC: 1.07 MCGFEU/ML (ref 0–0.5)
DEPRECATED RDW RBC AUTO: 50.1 FL (ref 37–54)
EGFRCR SERPLBLD CKD-EPI 2021: 81.3 ML/MIN/1.73
EOSINOPHIL # BLD AUTO: 0.05 10*3/MM3 (ref 0–0.4)
EOSINOPHIL NFR BLD AUTO: 0.5 % (ref 0.3–6.2)
ERYTHROCYTE [DISTWIDTH] IN BLOOD BY AUTOMATED COUNT: 13.9 % (ref 12.3–15.4)
GLOBULIN UR ELPH-MCNC: 2.7 GM/DL
GLUCOSE SERPL-MCNC: 101 MG/DL (ref 65–99)
HCT VFR BLD AUTO: 41.6 % (ref 34–46.6)
HGB BLD-MCNC: 14 G/DL (ref 12–15.9)
IMM GRANULOCYTES # BLD AUTO: 0.06 10*3/MM3 (ref 0–0.05)
IMM GRANULOCYTES NFR BLD AUTO: 0.5 % (ref 0–0.5)
LYMPHOCYTES # BLD AUTO: 1.5 10*3/MM3 (ref 0.7–3.1)
LYMPHOCYTES NFR BLD AUTO: 13.6 % (ref 19.6–45.3)
MAGNESIUM SERPL-MCNC: 2.1 MG/DL (ref 1.6–2.6)
MCH RBC QN AUTO: 33.3 PG (ref 26.6–33)
MCHC RBC AUTO-ENTMCNC: 33.7 G/DL (ref 31.5–35.7)
MCV RBC AUTO: 99 FL (ref 79–97)
MONOCYTES # BLD AUTO: 0.8 10*3/MM3 (ref 0.1–0.9)
MONOCYTES NFR BLD AUTO: 7.2 % (ref 5–12)
NEUTROPHILS NFR BLD AUTO: 77.9 % (ref 42.7–76)
NEUTROPHILS NFR BLD AUTO: 8.6 10*3/MM3 (ref 1.7–7)
NRBC BLD AUTO-RTO: 0 /100 WBC (ref 0–0.2)
NT-PROBNP SERPL-MCNC: <36 PG/ML (ref 0–900)
PLATELET # BLD AUTO: 317 10*3/MM3 (ref 140–450)
PMV BLD AUTO: 8.8 FL (ref 6–12)
POTASSIUM SERPL-SCNC: 4.2 MMOL/L (ref 3.5–5.2)
PROT SERPL-MCNC: 7.1 G/DL (ref 6–8.5)
QT INTERVAL: 382 MS
RBC # BLD AUTO: 4.2 10*6/MM3 (ref 3.77–5.28)
SODIUM SERPL-SCNC: 136 MMOL/L (ref 136–145)
T4 SERPL-MCNC: 8.69 MCG/DL (ref 4.5–11.7)
TROPONIN T SERPL HS-MCNC: <6 NG/L
TSH SERPL DL<=0.05 MIU/L-ACNC: 2.86 UIU/ML (ref 0.27–4.2)
WBC NRBC COR # BLD AUTO: 11.04 10*3/MM3 (ref 3.4–10.8)

## 2024-11-11 PROCEDURE — 83735 ASSAY OF MAGNESIUM: CPT | Performed by: EMERGENCY MEDICINE

## 2024-11-11 PROCEDURE — 71275 CT ANGIOGRAPHY CHEST: CPT

## 2024-11-11 PROCEDURE — 93005 ELECTROCARDIOGRAM TRACING: CPT | Performed by: EMERGENCY MEDICINE

## 2024-11-11 PROCEDURE — 25010000002 METHYLPREDNISOLONE PER 125 MG: Performed by: EMERGENCY MEDICINE

## 2024-11-11 PROCEDURE — 71275 CT ANGIOGRAPHY CHEST: CPT | Performed by: RADIOLOGY

## 2024-11-11 PROCEDURE — 84436 ASSAY OF TOTAL THYROXINE: CPT | Performed by: EMERGENCY MEDICINE

## 2024-11-11 PROCEDURE — 85379 FIBRIN DEGRADATION QUANT: CPT | Performed by: EMERGENCY MEDICINE

## 2024-11-11 PROCEDURE — 25810000003 SODIUM CHLORIDE 0.9 % SOLUTION: Performed by: EMERGENCY MEDICINE

## 2024-11-11 PROCEDURE — 36415 COLL VENOUS BLD VENIPUNCTURE: CPT

## 2024-11-11 PROCEDURE — 83880 ASSAY OF NATRIURETIC PEPTIDE: CPT | Performed by: EMERGENCY MEDICINE

## 2024-11-11 PROCEDURE — 99285 EMERGENCY DEPT VISIT HI MDM: CPT

## 2024-11-11 PROCEDURE — 80050 GENERAL HEALTH PANEL: CPT | Performed by: EMERGENCY MEDICINE

## 2024-11-11 PROCEDURE — 84484 ASSAY OF TROPONIN QUANT: CPT | Performed by: EMERGENCY MEDICINE

## 2024-11-11 PROCEDURE — 25510000001 IOPAMIDOL PER 1 ML: Performed by: EMERGENCY MEDICINE

## 2024-11-11 PROCEDURE — 96374 THER/PROPH/DIAG INJ IV PUSH: CPT

## 2024-11-11 PROCEDURE — 93010 ELECTROCARDIOGRAM REPORT: CPT | Performed by: INTERNAL MEDICINE

## 2024-11-11 RX ORDER — METHYLPREDNISOLONE SODIUM SUCCINATE 125 MG/2ML
125 INJECTION, POWDER, LYOPHILIZED, FOR SOLUTION INTRAMUSCULAR; INTRAVENOUS ONCE
Status: COMPLETED | OUTPATIENT
Start: 2024-11-11 | End: 2024-11-11

## 2024-11-11 RX ORDER — PREDNISONE 20 MG/1
20 TABLET ORAL
Qty: 15 TABLET | Refills: 0 | Status: SHIPPED | OUTPATIENT
Start: 2024-11-11

## 2024-11-11 RX ORDER — IOPAMIDOL 755 MG/ML
100 INJECTION, SOLUTION INTRAVASCULAR
Status: COMPLETED | OUTPATIENT
Start: 2024-11-11 | End: 2024-11-11

## 2024-11-11 RX ORDER — DOXYCYCLINE 100 MG/1
100 CAPSULE ORAL 2 TIMES DAILY
Qty: 20 CAPSULE | Refills: 0 | Status: SHIPPED | OUTPATIENT
Start: 2024-11-11 | End: 2024-11-21

## 2024-11-11 RX ADMIN — IOPAMIDOL 100 ML: 755 INJECTION, SOLUTION INTRAVENOUS at 12:59

## 2024-11-11 RX ADMIN — METHYLPREDNISOLONE SODIUM SUCCINATE 125 MG: 125 INJECTION, POWDER, FOR SOLUTION INTRAMUSCULAR; INTRAVENOUS at 14:57

## 2024-11-11 RX ADMIN — SODIUM CHLORIDE 1000 ML: 9 INJECTION, SOLUTION INTRAVENOUS at 11:26

## 2024-11-12 ENCOUNTER — TELEPHONE (OUTPATIENT)
Dept: GYNECOLOGIC ONCOLOGY | Facility: CLINIC | Age: 49
End: 2024-11-12
Payer: COMMERCIAL

## 2024-11-12 ENCOUNTER — TELEPHONE (OUTPATIENT)
Dept: ONCOLOGY | Facility: CLINIC | Age: 49
End: 2024-11-12
Payer: COMMERCIAL

## 2024-11-12 DIAGNOSIS — R00.2 PALPITATIONS: Primary | ICD-10-CM

## 2024-11-12 NOTE — TELEPHONE ENCOUNTER
RN returned pt call.  Patient had been to ER with increased HR.  RN inquired about visit.  States they this did an EKG and CXR and told her she had emphysema.  RN inquired if she had seen/called her PCP. States she did and was told they couldn't believe they did not admit her.  After speaking with provider, RN Advised patient to call treating oncologist.  Patient verbalized understanding.

## 2024-11-12 NOTE — TELEPHONE ENCOUNTER
Caller: Jie Aragon    Relationship: Self    Best call back number: [phone]    What is the best time to reach you: ANYTIME    Who are you requesting to speak with (clinical staff, provider,  specific staff member): CLINICAL      What was the call regarding: PT REQUESTING A CB FROM A NURSE REGARDING SOME HEART ISSUES SHE'S BEEN HAVING.  SHE HAS SOME QUESTIONS WHETHER OR NOT THIS COULD BE DIRECTLY RELATED TO HER CHEMO     PLEASE ADVISE

## 2024-11-13 ENCOUNTER — TRANSCRIBE ORDERS (OUTPATIENT)
Dept: ONCOLOGY | Facility: CLINIC | Age: 49
End: 2024-11-13

## 2024-11-13 ENCOUNTER — HOSPITAL ENCOUNTER (OUTPATIENT)
Dept: RESPIRATORY THERAPY | Facility: HOSPITAL | Age: 49
Discharge: HOME | End: 2024-11-13
Payer: COMMERCIAL

## 2024-11-13 ENCOUNTER — OFFICE VISIT (OUTPATIENT)
Dept: ONCOLOGY | Facility: CLINIC | Age: 49
End: 2024-11-13
Payer: COMMERCIAL

## 2024-11-13 VITALS
RESPIRATION RATE: 18 BRPM | HEART RATE: 120 BPM | TEMPERATURE: 97.8 F | SYSTOLIC BLOOD PRESSURE: 155 MMHG | BODY MASS INDEX: 22.48 KG/M2 | DIASTOLIC BLOOD PRESSURE: 94 MMHG | OXYGEN SATURATION: 98 % | WEIGHT: 152.2 LBS

## 2024-11-13 DIAGNOSIS — R00.2 PALPITATIONS: ICD-10-CM

## 2024-11-13 DIAGNOSIS — I15.9 SECONDARY HYPERTENSION: ICD-10-CM

## 2024-11-13 DIAGNOSIS — C56.3 MALIGNANT NEOPLASM OF BOTH OVARIES: Primary | ICD-10-CM

## 2024-11-13 DIAGNOSIS — R00.2 PALPITATIONS: Primary | ICD-10-CM

## 2024-11-13 DIAGNOSIS — J43.9 PULMONARY EMPHYSEMA, UNSPECIFIED EMPHYSEMA TYPE: ICD-10-CM

## 2024-11-13 LAB
ALBUMIN SERPL-MCNC: 4.1 G/DL (ref 3.5–5.2)
ALBUMIN/GLOB SERPL: 1.5 G/DL
ALP SERPL-CCNC: 100 U/L (ref 39–117)
ALT SERPL W P-5'-P-CCNC: 18 U/L (ref 1–33)
ANION GAP SERPL CALCULATED.3IONS-SCNC: 14 MMOL/L (ref 5–15)
AST SERPL-CCNC: 16 U/L (ref 1–32)
BASOPHILS # BLD AUTO: 0.03 10*3/MM3 (ref 0–0.2)
BASOPHILS NFR BLD AUTO: 0.3 % (ref 0–1.5)
BILIRUB SERPL-MCNC: 0.3 MG/DL (ref 0–1.2)
BUN SERPL-MCNC: 17 MG/DL (ref 6–20)
BUN/CREAT SERPL: 23 (ref 7–25)
CALCIUM SPEC-SCNC: 9.3 MG/DL (ref 8.6–10.5)
CHLORIDE SERPL-SCNC: 103 MMOL/L (ref 98–107)
CO2 SERPL-SCNC: 23 MMOL/L (ref 22–29)
CREAT SERPL-MCNC: 0.74 MG/DL (ref 0.57–1)
DEPRECATED RDW RBC AUTO: 50.5 FL (ref 37–54)
EGFRCR SERPLBLD CKD-EPI 2021: 98.7 ML/MIN/1.73
EOSINOPHIL # BLD AUTO: 0.02 10*3/MM3 (ref 0–0.4)
EOSINOPHIL NFR BLD AUTO: 0.2 % (ref 0.3–6.2)
ERYTHROCYTE [DISTWIDTH] IN BLOOD BY AUTOMATED COUNT: 13.8 % (ref 12.3–15.4)
GLOBULIN UR ELPH-MCNC: 2.8 GM/DL
GLUCOSE SERPL-MCNC: 94 MG/DL (ref 65–99)
HCT VFR BLD AUTO: 37.6 % (ref 34–46.6)
HGB BLD-MCNC: 12.7 G/DL (ref 12–15.9)
IMM GRANULOCYTES # BLD AUTO: 0.07 10*3/MM3 (ref 0–0.05)
IMM GRANULOCYTES NFR BLD AUTO: 0.6 % (ref 0–0.5)
LYMPHOCYTES # BLD AUTO: 1.44 10*3/MM3 (ref 0.7–3.1)
LYMPHOCYTES NFR BLD AUTO: 12.9 % (ref 19.6–45.3)
MAGNESIUM SERPL-MCNC: 2 MG/DL (ref 1.6–2.6)
MCH RBC QN AUTO: 33.3 PG (ref 26.6–33)
MCHC RBC AUTO-ENTMCNC: 33.8 G/DL (ref 31.5–35.7)
MCV RBC AUTO: 98.7 FL (ref 79–97)
MONOCYTES # BLD AUTO: 0.67 10*3/MM3 (ref 0.1–0.9)
MONOCYTES NFR BLD AUTO: 6 % (ref 5–12)
NEUTROPHILS NFR BLD AUTO: 8.94 10*3/MM3 (ref 1.7–7)
NEUTROPHILS NFR BLD AUTO: 80 % (ref 42.7–76)
NRBC BLD AUTO-RTO: 0 /100 WBC (ref 0–0.2)
PLATELET # BLD AUTO: 291 10*3/MM3 (ref 140–450)
PMV BLD AUTO: 9 FL (ref 6–12)
POTASSIUM SERPL-SCNC: 4 MMOL/L (ref 3.5–5.2)
PROT SERPL-MCNC: 6.9 G/DL (ref 6–8.5)
RBC # BLD AUTO: 3.81 10*6/MM3 (ref 3.77–5.28)
SODIUM SERPL-SCNC: 140 MMOL/L (ref 136–145)
WBC NRBC COR # BLD AUTO: 11.17 10*3/MM3 (ref 3.4–10.8)

## 2024-11-13 PROCEDURE — 83735 ASSAY OF MAGNESIUM: CPT

## 2024-11-13 PROCEDURE — 93005 ELECTROCARDIOGRAM TRACING: CPT

## 2024-11-13 PROCEDURE — 85025 COMPLETE CBC W/AUTO DIFF WBC: CPT

## 2024-11-13 PROCEDURE — 80053 COMPREHEN METABOLIC PANEL: CPT

## 2024-11-13 NOTE — PROGRESS NOTES
DATE:  11/13/2024    DIAGNOSIS:  Malignant neoplasm of both ovaries     CHIEF COMPLAINT:  Elevated heart rate and blood pressure    TREATMENT HISTORY:  Please see ESTHER England's most recent office note (10/14/2024) for full oncology history. she is being seen today for an acute visit.   She recently completed treatment on 10/14/2024 of carbo/Taxol.    HISTORY OF PRESENT ILLNESS:   Jie Aragon is a very pleasant 49 y.o. female who presented today for an acute visit.  She reports today that recently she had cold-like symptoms and reported to her PCPs office who performed a respiratory panel which was negative.  However, while she was at her PCPs office her heart rate and blood pressure were elevated.  She states her heart rate was in the 140s, but is unable to tell me what what her blood pressure was in PCPs office.  She was transported from her PCPs office to the ED due to these issues.  She was seen in [facility] ED on 11/11/2024 and was diagnosed with a COPD exacerbation (no known history of COPD) and prescribed prednisone and doxycycline.  Troponin and EKG were negative.  CT PE protocol was negative, but did show emphysema.  BNP was negative.  Electrolytes were replete.  She was discharged home.  She states that since this time she continues to have palpitations with any activity as well as shortness of breath with exertion.  She reports eating and drinking well.  Denies any chest pain.  Denies fever or chills.  Denies cough today.  She states that she did go back to her PCPs office who did a O2 monitor throughout the night on her and was told that her oxygen dropped throughout the night, therefore they started her on O2 at bedtime.  She was also told that her heart rate averaged in the 70s, with a high of 106 and lows in the 40s.  She states that her cold symptoms have resolved and she continues taking the prednisone and doxycycline.  She called and talked with our office yesterday who scheduled the  visit for today but also went ahead and referred her to cardiology (per Dr. Vasquez) and she is awaiting this appointment.  Otherwise no other new or specific complaints today.      The following portions of the patient's history were reviewed and updated as appropriate: allergies, current medications, past family history, past medical history, past social history, past surgical history and problem list.    PAST MEDICAL HISTORY:  Past Medical History:   Diagnosis Date    Change in vision     Multiple gestation     Ovarian cancer 05/24/2024    Wears dentures     UPPER AND LOWER       PAST SURGICAL HISTORY:  Past Surgical History:   Procedure Laterality Date    APPENDECTOMY N/A 5/23/2024    Procedure: APPENDECTOMY;  Surgeon: Shanda Dubois MD;  Location:  [facility];  Service: Gynecology Oncology;  Laterality: N/A;    CYSTOSCOPY Bilateral 5/23/2024    Procedure: CYSTOSCOPY TEMPORARY PLACEMENT BILATERAL URETERAL CATHETER;  Surgeon: Shanda Dubois MD;  Location:  [facility];  Service: Gynecology Oncology;  Laterality: Bilateral;    EXPLORATORY LAPAROTOMY, TOTAL ABDOMINAL HYSTERECTOMY SALPINGO OOPHORECTOMY N/A 5/23/2024    Procedure: EXPLORATORY LAPAROTOMY, TOTAL ABDOMINAL HYSTERECTOMY BILATERAL SALPINGO OOPHORECTOMY, CANCER STAGING/ OPTIMAL DEBULKLING (R=0);  Surgeon: Shanda Dubois MD;  Location:  [facility];  Service: Gynecology Oncology;  Laterality: N/A;    OMENTECTOMY N/A 5/23/2024    Procedure: OMENTECTOMY;  Surgeon: Shanda Dubois MD;  Location:  [facility];  Service: Gynecology Oncology;  Laterality: N/A;    PORTACATH PLACEMENT Right 6/24/2024    Procedure: INSERTION OF PORTACATH;  Surgeon: Yakov Chung MD;  [facility];  Service: General;  Laterality: Right;    TUBAL ABDOMINAL LIGATION         SOCIAL HISTORY:  Social History     Socioeconomic History    Marital status: Single   Tobacco Use    Smoking status: Every Day     Current packs/day: 1.00     Average packs/day: 1 pack/day for 15.0  years (15.0 ttl pk-yrs)     Types: Cigarettes     Passive exposure: Current    Smokeless tobacco: Never   Vaping Use    Vaping status: Never Used   Substance and Sexual Activity    Alcohol use: Never    Drug use: Never    Sexual activity: Yes     Partners: Male     Birth control/protection: I.U.D., Tubal ligation       FAMILY HISTORY:  Family History   Problem Relation Age of Onset    Stroke Father     Hypertension Father     Heart attack Father     Deep vein thrombosis Mother     Leukemia Brother     Testicular cancer Son          MEDICATIONS:  The current medication list was reviewed in the EMR    Current Outpatient Medications:     doxycycline (MONODOX) 100 MG capsule, Take 1 capsule by mouth 2 (Two) Times a Day for 10 days., Disp: 20 capsule, Rfl: 0    gabapentin (NEURONTIN) 100 MG capsule, Take 1 capsule by mouth 3 Times a Day., Disp: 90 capsule, Rfl: 3    granisetron (SANCUSO) 3.1 MG/24HR, Place 1 patch on the skin as directed by provider 1 (One) Time Per Week., Disp: 4 patch, Rfl: 0    ibuprofen (ADVIL,MOTRIN) 800 MG tablet, Take 1 tablet by mouth Every 8 (Eight) Hours As Needed for Mild Pain., Disp: 30 tablet, Rfl: 1    lidocaine-prilocaine (EMLA) 2.5-2.5 % cream, Apply to port-a-cath site 30 minutes prior to arrival at infusion center. Cover with plastic wrap., Disp: 30 g, Rfl: 1    loratadine (CLARITIN) 10 MG tablet, Take 1 tablet by mouth weekly starting the day of growth factor injection., Disp: 30 tablet, Rfl: 5    OLANZapine (ZyPREXA) 5 MG tablet, Take 1 tablet by mouth Every Night. Take nightly x 4 starting night of chemotherapy., Disp: 4 tablet, Rfl: 5    predniSONE (DELTASONE) 20 MG tablet, Take 1 tablet by mouth 3 (Three) Times a Day With Meals., Disp: 15 tablet, Rfl: 0    prochlorperazine (COMPAZINE) 10 MG tablet, Take 1 tablet by mouth Every 6 (Six) Hours As Needed for Nausea or Vomiting., Disp: 30 tablet, Rfl: 1    pyridoxine (VITAMIN B-6) 50 MG tablet tablet, Take 1 tablet by mouth Daily.,  Disp: 30 tablet, Rfl: 3    sertraline (Zoloft) 50 MG tablet, Take 1 tablet by mouth Daily., Disp: 30 tablet, Rfl: 3    ALLERGIES:    Allergies   Allergen Reactions    Amoxicillin Other (See Comments)     unknown    Penicillins Other (See Comments)     unknown         REVIEW OF SYSTEMS:    A comprehensive 14 point review of systems was performed.  Significant findings as mentioned above.  All other systems reviewed and are negative.      (1) Restricted in physically strenuous activity, ambulatory and able to do work of light nature  Physical Exam   Vital Signs:   Vitals:    11/13/24 0946   BP: 155/94   Pulse: 120   Resp: 18   Temp: 97.8 °F (36.6 °C)   SpO2: 98%      General: Well developed, well nourished, alert and oriented x 3, in no acute distress.   Head: ATNC   Eyes: PERRL, No evidence of conjunctivitis.   Nose: No nasal discharge.   Mouth: Oral mucosal membranes moist. No oral ulceration or hemorrhages.   Neck: Neck supple. No thyromegaly. No JVD.   Lungs: Clear in all fields to A&P.  No wheezing.  Heart: S1, S2. Regular rate and rhythm. No murmurs, rubs, or gallops.  Tachycardic.  Abdomen: Soft. Bowel sounds are normoactive. Nontender with palpation. No Hepatosplenomegaly can be appreciated.   Extremities: No cyanosis or edema. Peripheral pulses palpable and +2 bilaterally.   Integumentary: No rash, sores, erythema or nodules. No blistering, bruising, or dry skin.   Neurologic: Alert, awake and oriented x 3.  Grossly nonfocal exam.    Pain Score:  Pain Score    11/13/24 0946   PainSc: 0-No pain       PHQ-Score Total:  PHQ-9 Total Score:         PATHOLOGY:        ENDOSCOPY:        IMAGING:  CT Angiogram Chest Pulmonary Embolism    Result Date: 11/11/2024    Paraseptal emphysema noted.   This report was finalized on 11/11/2024 1:36 PM by Dr. Sherif Bridges MD.      CT Angiogram Chest Pulmonary Embolism    Result Date: 8/25/2024  Impression:  1.  Normal heart size. 2.  No thoracic aortic aneurysm or dissection. 3.   No pulmonary embolus. 4.  No bronchial inflammation. 5.  No mediastinal or hilar adenopathy. 6.  No features to suggest multifocal pneumonia.   This report was finalized on 8/25/2024 9:44 PM by Manuel Nino MD.      XR Chest 1 View    Result Date: 8/25/2024  No acute cardiopulmonary process.   This report was finalized on 8/25/2024 5:22 PM by Alex Pallas, DO.       EKG 11/13/24      RECENT LABS:  Lab Results   Component Value Date    WBC 11.17 (H) 11/13/2024    HGB 12.7 11/13/2024    HCT 37.6 11/13/2024    MCV 98.7 (H) 11/13/2024    RDW 13.8 11/13/2024     11/13/2024    NEUTRORELPCT 80.0 (H) 11/13/2024    LYMPHORELPCT 12.9 (L) 11/13/2024    MONORELPCT 6.0 11/13/2024    EOSRELPCT 0.2 (L) 11/13/2024    BASORELPCT 0.3 11/13/2024    NEUTROABS 8.94 (H) 11/13/2024    LYMPHSABS 1.44 11/13/2024       Lab Results   Component Value Date     11/13/2024    K 4.0 11/13/2024    CO2 23.0 11/13/2024     11/13/2024    BUN 17 11/13/2024    CREATININE 0.74 11/13/2024    GLUCOSE 94 11/13/2024    CALCIUM 9.3 11/13/2024    ALKPHOS 100 11/13/2024    AST 16 11/13/2024    ALT 18 11/13/2024    BILITOT 0.3 11/13/2024    ALBUMIN 4.1 11/13/2024    PROTEINTOT 6.9 11/13/2024    MG 2.0 11/13/2024    PHOS 3.6 08/27/2024       Lab Results   Component Value Date     08/25/2024       Lab Results   Component Value Date    FERRITIN 335.60 (H) 08/25/2024          ASSESSMENT & PLAN:  Jie Aragon is a very pleasant 49 y.o. female with    1.  High-grade serous carcinoma of bilateral ovaries  -Please see ESTHER England's most recent office note (10/14/2024) for full oncology history. she is being seen today for an acute visit.   She recently completed treatment on 10/14/2024 of carbo/Taxol.  She sees MD back on 2/6/2024.    2.  Elevated heart rate  3.  Elevated blood pressure  -Reports HR in the 140s and elevated BP at PCPs office on 11/11.  Presented to the ED on this day and was diagnosed with COPD exacerbation  (no known history of COPD) and prescribed prednisone and doxycycline.   -Troponin and EKG were negative.  CT PE protocol was negative, but did show emphysema.  BNP was negative.  Electrolytes were replete.    -Continues to report shortness of breath with exertion, as well as palpitations with any activity.  -Denies any chest pain.  Denies fever or chills.  Denies cough today.    -She did go back to her PCPs office due to an overnight O2 monitor and was told she needed O2 at nighttime, therefore they started this. She was also told that her heart rate averaged in the 70s, with a high of 106 and lows in the 40s.  -Cold symptoms have resolved.  She continues taking prednisone and doxycycline.  -Referral was placed for cardiology consult  -Placed referral for pulmonology consult today since COPD/emphysema is a new diagnosis for her.  -CBC, CMP, mag drawn today which were unremarkable.  Patient is hydrating well at home.  -EKG obtained again today which showed sinus tachycardia.  No ST elevation.  Rate was regular at 106 bpm.  BP in office today was 155/94.   -Educated patient that if symptoms were to become worse, she were to develop any chest pain or worsening shortness of breath, to report to the ED immediately.  In the interim we will work on getting patient in for consultations with cardiology and pulmonology.  Instructed her to let us know if she needs anything else and we will be happy to see her back at any time.  Instructed to continue to follow-up with PCP as planned as well.        ACO / ALEX/Other  Quality measures  -  Jie Aragon did not receive 2024 flu vaccine.  This was recommended.  -  Jie Aragon reports a pain score of 0.   -  Current outpatient and discharge medications have been reconciled for the patient.  Reviewed by: ESTHER Hall      A total of 35 minutes were spent coordinating this patient’s care in clinic today; more than 50% of this time was face-to-face  with the patient, reviewing her interim medical history and counseling on the current treatment and followup plan. All questions were answered to her satisfaction.      Electronically Signed by: ESTHER Hall  November 13, 2024 14:33 EST       CC:   No ref. provider found  Adela Gtz APRN

## 2024-11-14 LAB
QT INTERVAL: 350 MS
QTC INTERVAL: 464 MS

## 2024-11-18 NOTE — ED PROVIDER NOTES
Subjective     History provided by:  Patient   used: No    Shortness of Breath  Severity:  Mild  Onset quality:  Gradual  Duration: Several.  Timing:  Constant  Progression:  Worsening  Chronicity:  Chronic  Context: activity    Context: not animal exposure, not emotional upset, not fumes, not known allergens, not occupational exposure, not pollens, not smoke exposure, not strong odors, not URI and not weather changes    Relieved by:  Nothing  Worsened by:  Activity, coughing, deep breathing, exertion and movement  Ineffective treatments:  None tried  Associated symptoms: no abdominal pain, no chest pain, no claudication, no cough, no diaphoresis, no ear pain, no fever, no headaches, no hemoptysis, no neck pain, no PND, no rash, no sore throat, no sputum production, no syncope, no swollen glands, no vomiting and no wheezing    Risk factors: no recent alcohol use, no family hx of DVT, no hx of cancer, no hx of PE/DVT, no obesity, no oral contraceptive use, no prolonged immobilization, no recent surgery and no tobacco use        Review of Systems   Constitutional:  Negative for activity change, appetite change, chills, diaphoresis, fatigue and fever.   HENT:  Negative for congestion, ear pain and sore throat.    Eyes:  Negative for redness.   Respiratory:  Positive for shortness of breath. Negative for cough, hemoptysis, sputum production, chest tightness and wheezing.    Cardiovascular:  Negative for chest pain, palpitations, claudication, leg swelling, syncope and PND.   Gastrointestinal:  Negative for abdominal pain, diarrhea, nausea and vomiting.   Genitourinary:  Negative for dysuria and urgency.   Musculoskeletal:  Negative for arthralgias, back pain, myalgias and neck pain.   Skin:  Negative for pallor, rash and wound.   Neurological:  Negative for dizziness, speech difficulty, weakness and headaches.   Psychiatric/Behavioral:  Negative for agitation, behavioral problems, confusion and  decreased concentration.    All other systems reviewed and are negative.      Past Medical History:   Diagnosis Date    Change in vision     Multiple gestation     Ovarian cancer 05/24/2024    Wears dentures     UPPER AND LOWER       Allergies   Allergen Reactions    Amoxicillin Other (See Comments)     unknown    Penicillins Other (See Comments)     unknown       Past Surgical History:   Procedure Laterality Date    APPENDECTOMY N/A 5/23/2024    Procedure: APPENDECTOMY;  Surgeon: Shanda Dubois MD;  Location:  [facility];  Service: Gynecology Oncology;  Laterality: N/A;    CYSTOSCOPY Bilateral 5/23/2024    Procedure: CYSTOSCOPY TEMPORARY PLACEMENT BILATERAL URETERAL CATHETER;  Surgeon: Shanda Dubois MD;  Location:  [facility];  Service: Gynecology Oncology;  Laterality: Bilateral;    EXPLORATORY LAPAROTOMY, TOTAL ABDOMINAL HYSTERECTOMY SALPINGO OOPHORECTOMY N/A 5/23/2024    Procedure: EXPLORATORY LAPAROTOMY, TOTAL ABDOMINAL HYSTERECTOMY BILATERAL SALPINGO OOPHORECTOMY, CANCER STAGING/ OPTIMAL DEBULKLING (R=0);  Surgeon: Shanda Dubois MD;  Location:  [facility];  Service: Gynecology Oncology;  Laterality: N/A;    OMENTECTOMY N/A 5/23/2024    Procedure: OMENTECTOMY;  Surgeon: Shanda Dubois MD;  Location:  [facility];  Service: Gynecology Oncology;  Laterality: N/A;    PORTACATH PLACEMENT Right 6/24/2024    Procedure: INSERTION OF PORTACATH;  Surgeon: Yakov Chung MD;  Location:  [facility];  Service: General;  Laterality: Right;    TUBAL ABDOMINAL LIGATION         Family History   Problem Relation Age of Onset    Stroke Father     Hypertension Father     Heart attack Father     Deep vein thrombosis Mother     Leukemia Brother     Testicular cancer Son        Social History     Socioeconomic History    Marital status: Single   Tobacco Use    Smoking status: Every Day     Current packs/day: 1.00     Average packs/day: 1 pack/day for 15.0 years (15.0 ttl pk-yrs)     Types: Cigarettes     Passive exposure:  Current    Smokeless tobacco: Never   Vaping Use    Vaping status: Never Used   Substance and Sexual Activity    Alcohol use: Never    Drug use: Never    Sexual activity: Yes     Partners: Male     Birth control/protection: I.U.D., Tubal ligation           Objective   Physical Exam  Vitals and nursing note reviewed.   Constitutional:       General: She is not in acute distress.     Appearance: Normal appearance. She is well-developed. She is not toxic-appearing or diaphoretic.   HENT:      Head: Normocephalic and atraumatic.      Right Ear: External ear normal.      Left Ear: External ear normal.      Nose: Nose normal.      Mouth/Throat:      Pharynx: No oropharyngeal exudate.      Tonsils: No tonsillar exudate.   Eyes:      General: Lids are normal.      Conjunctiva/sclera: Conjunctivae normal.      Pupils: Pupils are equal, round, and reactive to light.   Neck:      Thyroid: No thyromegaly.   Cardiovascular:      Rate and Rhythm: Normal rate and regular rhythm.      Pulses: Normal pulses.      Heart sounds: Normal heart sounds, S1 normal and S2 normal.   Pulmonary:      Effort: Pulmonary effort is normal. No tachypnea or respiratory distress.      Breath sounds: Examination of the right-upper field reveals decreased breath sounds and wheezing. Examination of the left-upper field reveals decreased breath sounds and wheezing. Examination of the right-middle field reveals decreased breath sounds and wheezing. Examination of the left-middle field reveals decreased breath sounds and wheezing. Examination of the right-lower field reveals decreased breath sounds and wheezing. Examination of the left-lower field reveals decreased breath sounds and wheezing. Decreased breath sounds and wheezing present. No rales.   Chest:      Chest wall: No tenderness.   Abdominal:      General: Bowel sounds are normal. There is no distension.      Palpations: Abdomen is soft.      Tenderness: There is no abdominal tenderness. There is  no guarding or rebound.   Musculoskeletal:         General: No tenderness or deformity. Normal range of motion.      Cervical back: Full passive range of motion without pain, normal range of motion and neck supple.   Lymphadenopathy:      Cervical: No cervical adenopathy.   Skin:     General: Skin is warm and dry.      Coloration: Skin is not pale.      Findings: No erythema or rash.   Neurological:      Mental Status: She is alert and oriented to person, place, and time.      GCS: GCS eye subscore is 4. GCS verbal subscore is 5. GCS motor subscore is 6.      Cranial Nerves: No cranial nerve deficit.      Sensory: No sensory deficit.   Psychiatric:         Speech: Speech normal.         Behavior: Behavior normal.         Thought Content: Thought content normal.         Judgment: Judgment normal.         Procedures           ED Course  ED Course as of 11/18/24 1032   Mon Nov 11, 2024   1104 ECG 12 Lead Rhythm Change  Vent. Rate : 105 BPM     Atrial Rate : 105 BPM     P-R Int : 120 ms          QRS Dur :  70 ms      QT Int : 382 ms       P-R-T Axes :  81  76  43 degrees    QTcB Int : 504 ms     Sinus tachycardia  Possible Left atrial enlargement  Nonspecific T wave abnormality  Abnormal ECG  When compared with ECG of 26-Aug-2024 07:36,  Vent. rate has increased by  37 bpm  T wave inversion now evident in Inferior leads  Nonspecific T wave abnormality now evident in Lateral leads   [ES]   1449 CT Angiogram Chest Pulmonary Embolism  IMPRESSION:    Paraseptal emphysema noted.   [ES]      ED Course User Index  [ES] Brandan Ruiz MD                                                       Medical Decision Making      Final diagnoses:   COPD with acute exacerbation       ED Disposition  ED Disposition       ED Disposition   Discharge    Condition   Stable    Comment   --               Adela Gtz, APRN  [address] [facility] [address]  [facility] [address]  [phone]    Schedule an appointment as soon as possible  for a visit in 1 day  EVALUATE         Medication List        New Prescriptions      doxycycline 100 MG capsule  Commonly known as: MONODOX  Take 1 capsule by mouth 2 (Two) Times a Day for 10 days.     predniSONE 20 MG tablet  Commonly known as: DELTASONE  Take 1 tablet by mouth 3 (Three) Times a Day With Meals.               Where to Get Your Medications        These medications were sent to [facility] [address] [address]      Hours: Monday to Friday 7 AM to 6 PM Phone: [phone]   doxycycline 100 MG capsule  predniSONE 20 MG tablet            Brandan Ruiz MD  11/18/24 8888

## 2024-11-20 ENCOUNTER — OFFICE VISIT (OUTPATIENT)
Dept: CARDIOLOGY | Facility: CLINIC | Age: 49
End: 2024-11-20
Payer: COMMERCIAL

## 2024-11-20 VITALS
HEART RATE: 103 BPM | OXYGEN SATURATION: 100 % | HEIGHT: 69 IN | SYSTOLIC BLOOD PRESSURE: 119 MMHG | RESPIRATION RATE: 16 BRPM | BODY MASS INDEX: 22.93 KG/M2 | DIASTOLIC BLOOD PRESSURE: 76 MMHG | WEIGHT: 154.8 LBS

## 2024-11-20 DIAGNOSIS — C56.2 MALIGNANT NEOPLASM OF LEFT OVARY: ICD-10-CM

## 2024-11-20 DIAGNOSIS — Z72.0 TOBACCO ABUSE: ICD-10-CM

## 2024-11-20 DIAGNOSIS — R00.2 PALPITATIONS: Primary | ICD-10-CM

## 2024-11-20 DIAGNOSIS — R06.09 DOE (DYSPNEA ON EXERTION): ICD-10-CM

## 2024-11-20 DIAGNOSIS — U07.1 COVID-19: ICD-10-CM

## 2024-11-20 DIAGNOSIS — E78.5 HYPERLIPIDEMIA LDL GOAL <70: ICD-10-CM

## 2024-11-20 RX ORDER — SODIUM CHLORIDE 0.9 % (FLUSH) 0.9 %
10 SYRINGE (ML) INJECTION EVERY 12 HOURS SCHEDULED
OUTPATIENT
Start: 2024-11-20

## 2024-11-20 RX ORDER — NITROGLYCERIN 0.4 MG/1
0.4 TABLET SUBLINGUAL
OUTPATIENT
Start: 2024-11-20 | End: 2024-11-20

## 2024-11-20 RX ORDER — ATORVASTATIN CALCIUM 20 MG/1
20 TABLET, FILM COATED ORAL DAILY
Qty: 30 TABLET | Refills: 5 | Status: SHIPPED | OUTPATIENT
Start: 2024-11-20

## 2024-11-20 RX ORDER — METOPROLOL SUCCINATE 25 MG/1
25 TABLET, EXTENDED RELEASE ORAL DAILY
Qty: 30 TABLET | Refills: 5 | Status: SHIPPED | OUTPATIENT
Start: 2024-11-20

## 2024-11-20 RX ORDER — METOPROLOL TARTRATE 50 MG
50 TABLET ORAL ONCE
Qty: 1 TABLET | Refills: 0 | Status: SHIPPED | OUTPATIENT
Start: 2024-11-20 | End: 2024-11-20

## 2024-11-20 RX ORDER — SODIUM CHLORIDE 9 MG/ML
40 INJECTION, SOLUTION INTRAVENOUS AS NEEDED
OUTPATIENT
Start: 2024-11-20

## 2024-11-20 RX ORDER — SODIUM CHLORIDE 0.9 % (FLUSH) 0.9 %
10 SYRINGE (ML) INJECTION AS NEEDED
OUTPATIENT
Start: 2024-11-20

## 2024-11-20 NOTE — PROGRESS NOTES
Adela Gtz, APRN  No ref. provider found    Jie Aragon  1975 11/20/2024    Subjective     Jie Aragon is a 49 y.o. female who presents today to [facility] CARD[facility] for Establish Care (Abnormal EKG, s/p recent hosp stay Covid), Palpitations (Races, also eval by [facility] er), and Med Management.    History of Present Illness:    49-year-old female with history of stage II ovarian cancer status post bilateral salpingo-oophorectomy and hysterectomy and completion of chemotherapy with carboplatin and paclitaxel, 30-pack-year smoking history comes in today for evaluation of palpitations and to establish care.    Patient reports that back in May 2024 she found a mass in her lower abdomen for which she saw a doctor and was diagnosed with stage II ovarian cancer.  She was referred to an oncologist in [place] and underwent surgical removal of her ovaries and uterus followed by chemotherapy.  She had an episode of COVID-19 infection back in August 2024 for which she was hospitalized.  About 2 weeks ago patient started noticing that her heart is beating fast.  She was recently at her doctor who noticed her heart rate to be up in 140s for which she was sent to emergency room.  Referred to cardiology for the evaluation of the palpitations.  She had a CT pulmonary embolism protocol in the ED which did not show any evidence of PE.  Patient reports that she gets short of breath on minimal exertion.  She was previously getting dizzy and lightheaded while she was getting chemotherapy but denies any more episodes of dizziness or lightheadedness associated with these palpitations.     Denies having any allergies.  Family history is positive for heart attack and a maternal uncle who passed away at age of 61.  She is an active smoker and is currently trying to quit.  Denies drinking alcohol or substance use.        Allergies   Allergen Reactions    Amoxicillin Other (See Comments)      unknown    Penicillins Other (See Comments)     unknown   :    Current Outpatient Medications:     doxycycline (MONODOX) 100 MG capsule, Take 1 capsule by mouth 2 (Two) Times a Day for 10 days., Disp: 20 capsule, Rfl: 0    gabapentin (NEURONTIN) 100 MG capsule, Take 1 capsule by mouth 3 Times a Day., Disp: 90 capsule, Rfl: 3    granisetron (SANCUSO) 3.1 MG/24HR, Place 1 patch on the skin as directed by provider 1 (One) Time Per Week. (Patient taking differently: Place 1 patch on the skin as directed by provider 1 (One) Time Per Week. Only with Chemo), Disp: 4 patch, Rfl: 0    ibuprofen (ADVIL,MOTRIN) 800 MG tablet, Take 1 tablet by mouth Every 8 (Eight) Hours As Needed for Mild Pain., Disp: 30 tablet, Rfl: 1    lidocaine-prilocaine (EMLA) 2.5-2.5 % cream, Apply to port-a-cath site 30 minutes prior to arrival at infusion center. Cover with plastic wrap., Disp: 30 g, Rfl: 1    O2 (OXYGEN), Inhale 2 L/min Every Night., Disp: , Rfl:     OLANZapine (ZyPREXA) 5 MG tablet, Take 1 tablet by mouth Every Night. Take nightly x 4 starting night of chemotherapy., Disp: 4 tablet, Rfl: 5    predniSONE (DELTASONE) 20 MG tablet, Take 1 tablet by mouth 3 (Three) Times a Day With Meals., Disp: 15 tablet, Rfl: 0    prochlorperazine (COMPAZINE) 10 MG tablet, Take 1 tablet by mouth Every 6 (Six) Hours As Needed for Nausea or Vomiting., Disp: 30 tablet, Rfl: 1    pyridoxine (VITAMIN B-6) 50 MG tablet tablet, Take 1 tablet by mouth Daily., Disp: 30 tablet, Rfl: 3    sertraline (Zoloft) 50 MG tablet, Take 1 tablet by mouth Daily., Disp: 30 tablet, Rfl: 3    atorvastatin (LIPITOR) 20 MG tablet, Take 1 tablet by mouth Daily., Disp: 30 tablet, Rfl: 5    loratadine (CLARITIN) 10 MG tablet, Take 1 tablet by mouth weekly starting the day of growth factor injection. (Patient not taking: Reported on 11/20/2024), Disp: 30 tablet, Rfl: 5    metoprolol succinate XL (Toprol XL) 25 MG 24 hr tablet, Take 1 tablet by mouth Daily., Disp: 30 tablet,  Rfl: 5    metoprolol tartrate (LOPRESSOR) 50 MG tablet, Take 1 tablet by mouth 1 (One) Time for 1 dose. Take 50 mg One (1) Hour Prior to Coronary CTA, Disp: 1 tablet, Rfl: 0    Past Medical History:   Diagnosis Date    Change in vision     Multiple gestation     Ovarian cancer 05/24/2024    Wears dentures     UPPER AND LOWER     Past Surgical History:   Procedure Laterality Date    APPENDECTOMY N/A 5/23/2024    Procedure: APPENDECTOMY;  Surgeon: Shanda Dubois MD;  Location:  [facility];  Service: Gynecology Oncology;  Laterality: N/A;    CYSTOSCOPY Bilateral 5/23/2024    Procedure: CYSTOSCOPY TEMPORARY PLACEMENT BILATERAL URETERAL CATHETER;  Surgeon: Shanda Dubois MD;  Location:  [facility];  Service: Gynecology Oncology;  Laterality: Bilateral;    EXPLORATORY LAPAROTOMY, TOTAL ABDOMINAL HYSTERECTOMY SALPINGO OOPHORECTOMY N/A 5/23/2024    Procedure: EXPLORATORY LAPAROTOMY, TOTAL ABDOMINAL HYSTERECTOMY BILATERAL SALPINGO OOPHORECTOMY, CANCER STAGING/ OPTIMAL DEBULKLING (R=0);  Surgeon: Shanda Dubois MD;  Location:  [facility];  Service: Gynecology Oncology;  Laterality: N/A;    OMENTECTOMY N/A 5/23/2024    Procedure: OMENTECTOMY;  Surgeon: Shanda Dubois MD;  Location:  [facility];  Service: Gynecology Oncology;  Laterality: N/A;    PORTACATH PLACEMENT Right 6/24/2024    Procedure: INSERTION OF PORTACATH;  Surgeon: Yakov Chung MD;  Location:  [facility];  Service: General;  Laterality: Right;    TUBAL ABDOMINAL LIGATION       Family History   Problem Relation Age of Onset    Stroke Father     Hypertension Father     Heart attack Father     Deep vein thrombosis Mother     Leukemia Brother     Testicular cancer Son      Social History     Tobacco Use    Smoking status: Every Day     Current packs/day: 1.00     Average packs/day: 1 pack/day for 15.0 years (15.0 ttl pk-yrs)     Types: Cigarettes     Passive exposure: Current    Smokeless tobacco: Never   Vaping Use    Vaping status: Never Used   Substance  "Use Topics    Alcohol use: Never    Drug use: Never       Objective   Blood pressure 119/76, pulse 103, resp. rate 16, height 175.3 cm (69\"), weight 70.2 kg (154 lb 12.8 oz), SpO2 100%.  Body mass index is 22.86 kg/m².    Constitutional:       Appearance: Not in distress.   Pulmonary:      Breath sounds: Normal breath sounds.   Cardiovascular:      Tachycardia present. Regular rhythm. Normal S1. Normal S2.       Murmurs: There is no murmur.   Edema:     Peripheral edema absent.   Skin:     General: Skin is warm.   Neurological:      General: No focal deficit present.           DATA:   Results for orders placed during the hospital encounter of 08/25/24    Adult Transthoracic Echo Complete W/ Cont if Necessary Per Protocol    Interpretation Summary    Left ventricular systolic function is normal. Left ventricular ejection fraction appears to be 61 - 65%.    Left ventricular diastolic function was normal.    Estimated right ventricular systolic pressure from tricuspid regurgitation is normal (<35 mmHg).          No results found for: \"BNP\"  No results found for: \"PSA\"   Lab Results   Component Value Date    MG 2.0 11/13/2024     No results found for: \"INR\"  Lab Results   Component Value Date    CKTOTAL 35 08/25/2024     Lab Results   Component Value Date    CHOL 236 (H) 08/26/2024     Lab Results   Component Value Date    TRIG 73 08/26/2024     Lab Results   Component Value Date    HDL 40 08/26/2024     Lab Results   Component Value Date     (H) 08/26/2024     No components found for: \"A1C\"      Lab Results   Component Value Date    TSH 2.860 11/11/2024       Results from last 7 days   Lab Units 11/13/24  1042   SODIUM mmol/L 140   POTASSIUM mmol/L 4.0   CHLORIDE mmol/L 103   CO2 mmol/L 23.0   BUN mg/dL 17   CREATININE mg/dL 0.74   CALCIUM mg/dL 9.3   BILIRUBIN mg/dL 0.3   ALK PHOS U/L 100   ALT (SGPT) U/L 18   AST (SGOT) U/L 16   GLUCOSE mg/dL 94     Results from last 7 days   Lab Units 11/13/24  1042   WBC " 10*3/mm3 11.17*   HEMOGLOBIN g/dL 12.7   HEMATOCRIT % 37.6   PLATELETS 10*3/mm3 291       Results review: During today's encounter, all relevant clinical data has been reviewed.        ECG 12 Lead    Date/Time: 11/20/2024 9:18 AM  Performed by: Arminda Christian MD    Authorized by: Arminda Christian MD  Rhythm: sinus tachycardia  Rate: normal  QRS axis: normal    Clinical impression: abnormal EKG          Assessment & Plan    Diagnosis Plan   1. Palpitations  ECG 12 Lead    Holter Monitor - 72 Hour Up To 15 Days    CT Angiogram Coronary    nitroglycerin (NITROSTAT) SL tablet 0.4 mg    No Caffeine or Nicotine 4 Hours Prior to CTA Appointment    Nothing to Eat or Drink 4 Hours Prior to CTA Appointment    Do Not Take Phosphodiasterase Inhibitors in the 72 Hours Prior to Coronary CTA    Obtain Informed Consent - Computed Tomography Angiography of Chest - Angiogram of Coronary Arteries    Vital Signs Upon Arrival    Cardiac Monitoring    Verify NPO Status - Patient to Be NPO at Least 4 Hours Prior to CTA    Notify CT After Administration of metoprolol tartrate (LOPRESSOR) tablet    Notify Provider If Total Metoprolol Given Equals 300mg & Heart Rate Not At Goal    Notify Provider Prior to Administration of Nitroglycerin if Patient SBP <80    POC Creatinine    Insert Peripheral IV    Saline Lock & Maintain IV Access    sodium chloride 0.9 % flush 10 mL    sodium chloride 0.9 % flush 10 mL    sodium chloride 0.9 % infusion 40 mL    Vital Signs - See Instructions    Hold Medication Metformin (Glucophage, Glucophage XR, Fortament, Glumetza);  Metglip (metformin/glipizide);  Glucovance (metformin/glyburide); Avandamet (metformin/rosiglitazone)    Patient May Discharge Home After Procedure Complete (If Stable)    metoprolol tartrate (LOPRESSOR) 50 MG tablet    metoprolol succinate XL (Toprol XL) 25 MG 24 hr tablet      2. PICKARD (dyspnea on exertion)  CT Angiogram Coronary    nitroglycerin (NITROSTAT) SL tablet 0.4 mg    No  Caffeine or Nicotine 4 Hours Prior to CTA Appointment    Nothing to Eat or Drink 4 Hours Prior to CTA Appointment    Do Not Take Phosphodiasterase Inhibitors in the 72 Hours Prior to Coronary CTA    Obtain Informed Consent - Computed Tomography Angiography of Chest - Angiogram of Coronary Arteries    Vital Signs Upon Arrival    Cardiac Monitoring    Verify NPO Status - Patient to Be NPO at Least 4 Hours Prior to CTA    Notify CT After Administration of metoprolol tartrate (LOPRESSOR) tablet    Notify Provider If Total Metoprolol Given Equals 300mg & Heart Rate Not At Goal    Notify Provider Prior to Administration of Nitroglycerin if Patient SBP <80    POC Creatinine    Insert Peripheral IV    Saline Lock & Maintain IV Access    sodium chloride 0.9 % flush 10 mL    sodium chloride 0.9 % flush 10 mL    sodium chloride 0.9 % infusion 40 mL    Vital Signs - See Instructions    Hold Medication Metformin (Glucophage, Glucophage XR, Fortament, Glumetza);  Metglip (metformin/glipizide);  Glucovance (metformin/glyburide); Avandamet (metformin/rosiglitazone)    Patient May Discharge Home After Procedure Complete (If Stable)    metoprolol tartrate (LOPRESSOR) 50 MG tablet    Adult Transthoracic Echo Complete w/ Color, Spectral and Contrast if necessary per protocol      3. Malignant neoplasm of left ovary        4. Tobacco abuse  CT Angiogram Coronary    nitroglycerin (NITROSTAT) SL tablet 0.4 mg    No Caffeine or Nicotine 4 Hours Prior to CTA Appointment    Nothing to Eat or Drink 4 Hours Prior to CTA Appointment    Do Not Take Phosphodiasterase Inhibitors in the 72 Hours Prior to Coronary CTA    Obtain Informed Consent - Computed Tomography Angiography of Chest - Angiogram of Coronary Arteries    Vital Signs Upon Arrival    Cardiac Monitoring    Verify NPO Status - Patient to Be NPO at Least 4 Hours Prior to CTA    Notify CT After Administration of metoprolol tartrate (LOPRESSOR) tablet    Notify Provider If Total  Metoprolol Given Equals 300mg & Heart Rate Not At Goal    Notify Provider Prior to Administration of Nitroglycerin if Patient SBP <80    POC Creatinine    Insert Peripheral IV    Saline Lock & Maintain IV Access    sodium chloride 0.9 % flush 10 mL    sodium chloride 0.9 % flush 10 mL    sodium chloride 0.9 % infusion 40 mL    Vital Signs - See Instructions    Hold Medication Metformin (Glucophage, Glucophage XR, Fortament, Glumetza);  Metglip (metformin/glipizide);  Glucovance (metformin/glyburide); Avandamet (metformin/rosiglitazone)    Patient May Discharge Home After Procedure Complete (If Stable)    metoprolol tartrate (LOPRESSOR) 50 MG tablet      5. COVID-19        6. Hyperlipidemia LDL goal <70  atorvastatin (LIPITOR) 20 MG tablet          Recommendations  Orders Placed This Encounter   Procedures    CT Angiogram Coronary    No Caffeine or Nicotine 4 Hours Prior to CTA Appointment    Nothing to Eat or Drink 4 Hours Prior to CTA Appointment    Do Not Take Phosphodiasterase Inhibitors in the 72 Hours Prior to Coronary CTA    Holter Monitor - 72 Hour Up To 15 Days    ECG 12 Lead    Adult Transthoracic Echo Complete w/ Color, Spectral and Contrast if necessary per protocol        For the symptoms of palpitations, we will get a holter monitor for 2 weeks.  Will start patient on Toprol-XL 25 mg p.o. once daily.  Advised the patient to check her heart rate and blood pressure daily and call me to let me know the numbers in about 10 days.  Will consider going up on the dose to Toprol-XL 50 p.o. once daily if patient is tolerating it well.   For her dyspnea on exertion, long-term history of smoking and significant hyperlipidemia.  Will get a CT coronary angiogram for further evaluation.  Hopefully the heart rate is around 60 bpm by the time the CT scan is going to be done.  Will also get a repeat echocardiogram as her heart sounds were muffled and she has history of ovarian cancer so there is a concern for pericardial  effusion.   Given her history of significant hyperlipidemia with prior transaminitis.  Current CMP is showing resolution of the transaminitis, will start patient on Lipitor 20 mg p.o. once daily for ASCVD risk reduction.   The patient was educated on the harms of smoking and the importance of quitting. We discussed nicotine replacement strategies as well pharmacotherapies as aides to help with quitting, and the higher rates of success of smoking cessation with these aides.        New Medications:   New Medications Ordered This Visit   Medications    metoprolol tartrate (LOPRESSOR) 50 MG tablet     Sig: Take 1 tablet by mouth 1 (One) Time for 1 dose. Take 50 mg One (1) Hour Prior to Coronary CTA     Dispense:  1 tablet     Refill:  0    metoprolol succinate XL (Toprol XL) 25 MG 24 hr tablet     Sig: Take 1 tablet by mouth Daily.     Dispense:  30 tablet     Refill:  5    atorvastatin (LIPITOR) 20 MG tablet     Sig: Take 1 tablet by mouth Daily.     Dispense:  30 tablet     Refill:  5       Discontinued Medications:   There are no discontinued medications.     Return in about 6 weeks (around 1/1/2025).      Thank you for allowing me to participate in the care of Jie Aragon. Feel free to contact me directly with any further questions or concerns.      This document has been electronically signed by Arminda Christian MD   November 20, 2024 10:34 EST    Dictated Utilizing Dragon Dictation: Part of this note may be an electronic transcription/translation of spoken language to printed text using the Dragon Dictation System.      She has history of cancer

## 2024-11-22 ENCOUNTER — HOSPITAL ENCOUNTER (OUTPATIENT)
Dept: CT IMAGING | Facility: HOSPITAL | Age: 49
Discharge: HOME | End: 2024-11-22
Payer: COMMERCIAL

## 2024-11-22 ENCOUNTER — HOSPITAL ENCOUNTER (OUTPATIENT)
Dept: MAMMOGRAPHY | Facility: HOSPITAL | Age: 49
Discharge: HOME | End: 2024-11-22
Payer: COMMERCIAL

## 2024-11-22 DIAGNOSIS — C56.2 MALIGNANT NEOPLASM OF LEFT OVARY: ICD-10-CM

## 2024-11-22 PROCEDURE — 77067 SCR MAMMO BI INCL CAD: CPT

## 2024-11-22 PROCEDURE — 77063 BREAST TOMOSYNTHESIS BI: CPT

## 2024-11-24 ENCOUNTER — HOSPITAL ENCOUNTER (OUTPATIENT)
Dept: CT IMAGING | Facility: HOSPITAL | Age: 49
Discharge: HOME | End: 2024-11-24
Admitting: OBSTETRICS & GYNECOLOGY
Payer: COMMERCIAL

## 2024-11-24 PROCEDURE — 25510000001 IOPAMIDOL 61 % SOLUTION: Performed by: OBSTETRICS & GYNECOLOGY

## 2024-11-24 PROCEDURE — 74177 CT ABD & PELVIS W/CONTRAST: CPT

## 2024-11-24 PROCEDURE — 74177 CT ABD & PELVIS W/CONTRAST: CPT | Performed by: RADIOLOGY

## 2024-11-24 RX ORDER — IOPAMIDOL 612 MG/ML
100 INJECTION, SOLUTION INTRAVASCULAR
Status: COMPLETED | OUTPATIENT
Start: 2024-11-24 | End: 2024-11-24

## 2024-11-24 RX ADMIN — IOPAMIDOL 100 ML: 612 INJECTION, SOLUTION INTRAVENOUS at 11:01

## 2024-11-25 ENCOUNTER — TELEPHONE (OUTPATIENT)
Dept: GYNECOLOGIC ONCOLOGY | Facility: CLINIC | Age: 49
End: 2024-11-25
Payer: COMMERCIAL

## 2024-11-25 NOTE — TELEPHONE ENCOUNTER
Caller: Jie Aragon    Relationship: Self    Best call back number: [phone]    What test was performed: CT SCAN    When was the test performed: 11/24    Where was the test performed: [facility]

## 2024-11-25 NOTE — TELEPHONE ENCOUNTER
RN returned pt call.  Pt inquiring about CT scan results and if provider has seen them.  RN advised pt we would call once provider viewed report.  Patient verbalized understanding.

## 2024-11-26 ENCOUNTER — TELEPHONE (OUTPATIENT)
Dept: ONCOLOGY | Facility: CLINIC | Age: 49
End: 2024-11-26
Payer: COMMERCIAL

## 2024-11-26 ENCOUNTER — INFUSION (OUTPATIENT)
Dept: ONCOLOGY | Facility: HOSPITAL | Age: 49
End: 2024-11-26
Payer: COMMERCIAL

## 2024-11-26 VITALS
RESPIRATION RATE: 18 BRPM | OXYGEN SATURATION: 99 % | HEART RATE: 86 BPM | DIASTOLIC BLOOD PRESSURE: 66 MMHG | SYSTOLIC BLOOD PRESSURE: 96 MMHG | TEMPERATURE: 97.8 F

## 2024-11-26 DIAGNOSIS — Z95.828 PORT-A-CATH IN PLACE: Primary | ICD-10-CM

## 2024-11-26 PROCEDURE — 25010000002 HEPARIN LOCK FLUSH PER 10 UNITS

## 2024-11-26 PROCEDURE — 96523 IRRIG DRUG DELIVERY DEVICE: CPT

## 2024-11-26 RX ORDER — SODIUM CHLORIDE 0.9 % (FLUSH) 0.9 %
10 SYRINGE (ML) INJECTION AS NEEDED
OUTPATIENT
Start: 2024-11-26

## 2024-11-26 RX ORDER — HEPARIN SODIUM (PORCINE) LOCK FLUSH IV SOLN 100 UNIT/ML 100 UNIT/ML
500 SOLUTION INTRAVENOUS AS NEEDED
Status: DISCONTINUED | OUTPATIENT
Start: 2024-11-26 | End: 2024-11-26 | Stop reason: HOSPADM

## 2024-11-26 RX ORDER — SODIUM CHLORIDE 0.9 % (FLUSH) 0.9 %
10 SYRINGE (ML) INJECTION AS NEEDED
Status: DISCONTINUED | OUTPATIENT
Start: 2024-11-26 | End: 2024-11-26 | Stop reason: HOSPADM

## 2024-11-26 RX ORDER — HEPARIN SODIUM (PORCINE) LOCK FLUSH IV SOLN 100 UNIT/ML 100 UNIT/ML
500 SOLUTION INTRAVENOUS AS NEEDED
OUTPATIENT
Start: 2024-11-26

## 2024-11-26 RX ADMIN — Medication 10 ML: at 15:10

## 2024-11-26 RX ADMIN — HEPARIN 500 UNITS: 100 SYRINGE at 15:10

## 2024-11-26 NOTE — TELEPHONE ENCOUNTER
----- Message from Shanda Dubois sent at 11/26/2024  1:09 PM EST -----  Please call patient and notify her that CT scan is negative for recurrence.  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 F/ U as scheduled  ----- Message -----  From: Saul, Rad Results [place] In  Sent: 11/24/2024  11:18 AM EST  To: Shanda Dubois MD

## 2024-11-26 NOTE — TELEPHONE ENCOUNTER
Patient called and notified of Providers comments for Scan results with verbalized understanding. Patient asks about mammo results but Provider has not yet reviewed for comments yet, informed patient asap she releases comments staff will call and provide those to her.

## 2024-12-05 ENCOUNTER — HOSPITAL ENCOUNTER (OUTPATIENT)
Dept: CT IMAGING | Facility: HOSPITAL | Age: 49
Discharge: HOME | End: 2024-12-05
Payer: COMMERCIAL

## 2024-12-05 ENCOUNTER — HOSPITAL ENCOUNTER (OUTPATIENT)
Dept: CARDIOLOGY | Facility: HOSPITAL | Age: 49
Discharge: HOME | End: 2024-12-05
Payer: COMMERCIAL

## 2024-12-05 VITALS — HEART RATE: 95 BPM | SYSTOLIC BLOOD PRESSURE: 90 MMHG | DIASTOLIC BLOOD PRESSURE: 60 MMHG

## 2024-12-05 DIAGNOSIS — R06.09 DOE (DYSPNEA ON EXERTION): ICD-10-CM

## 2024-12-05 DIAGNOSIS — Z72.0 TOBACCO ABUSE: ICD-10-CM

## 2024-12-05 DIAGNOSIS — R00.2 PALPITATIONS: ICD-10-CM

## 2024-12-05 PROCEDURE — 93306 TTE W/DOPPLER COMPLETE: CPT

## 2024-12-05 PROCEDURE — 75574 CT ANGIO HRT W/3D IMAGE: CPT

## 2024-12-05 PROCEDURE — 25510000001 IOPAMIDOL PER 1 ML: Performed by: INTERNAL MEDICINE

## 2024-12-05 RX ORDER — NITROGLYCERIN 0.4 MG/1
0.4 TABLET SUBLINGUAL
Status: DISCONTINUED | OUTPATIENT
Start: 2024-12-05 | End: 2024-12-06 | Stop reason: HOSPADM

## 2024-12-05 RX ORDER — IOPAMIDOL 755 MG/ML
100 INJECTION, SOLUTION INTRAVASCULAR
Status: COMPLETED | OUTPATIENT
Start: 2024-12-05 | End: 2024-12-05

## 2024-12-05 RX ADMIN — NITROGLYCERIN 0.4 MG: 0.4 TABLET, ORALLY DISINTEGRATING SUBLINGUAL at 11:05

## 2024-12-05 RX ADMIN — IOPAMIDOL 96 ML: 755 INJECTION, SOLUTION INTRAVENOUS at 11:04

## 2024-12-09 LAB
BH CV ECHO MEAS - ACS: 2.3 CM
BH CV ECHO MEAS - AO MAX PG: 5.4 MMHG
BH CV ECHO MEAS - AO MEAN PG: 3 MMHG
BH CV ECHO MEAS - AO ROOT DIAM: 3.2 CM
BH CV ECHO MEAS - AO V2 MAX: 116 CM/SEC
BH CV ECHO MEAS - AO V2 VTI: 22.2 CM
BH CV ECHO MEAS - AVA(I,D): 2.7 CM2
BH CV ECHO MEAS - EDV(CUBED): 103.8 ML
BH CV ECHO MEAS - EDV(MOD-SP2): 49.6 ML
BH CV ECHO MEAS - EDV(MOD-SP4): 47.8 ML
BH CV ECHO MEAS - EF(MOD-BP): 56.6 %
BH CV ECHO MEAS - EF(MOD-SP2): 51.8 %
BH CV ECHO MEAS - EF(MOD-SP4): 59.2 %
BH CV ECHO MEAS - ESV(CUBED): 42.9 ML
BH CV ECHO MEAS - ESV(MOD-SP2): 23.9 ML
BH CV ECHO MEAS - ESV(MOD-SP4): 19.5 ML
BH CV ECHO MEAS - FS: 25.5 %
BH CV ECHO MEAS - IVS/LVPW: 1.13 CM
BH CV ECHO MEAS - IVSD: 0.9 CM
BH CV ECHO MEAS - LA DIMENSION: 3 CM
BH CV ECHO MEAS - LAT PEAK E' VEL: 12.4 CM/SEC
BH CV ECHO MEAS - LV DIASTOLIC VOL/BSA (35-75): 25.9 CM2
BH CV ECHO MEAS - LV MASS(C)D: 132.3 GRAMS
BH CV ECHO MEAS - LV MAX PG: 4.2 MMHG
BH CV ECHO MEAS - LV MEAN PG: 2 MMHG
BH CV ECHO MEAS - LV SYSTOLIC VOL/BSA (12-30): 10.5 CM2
BH CV ECHO MEAS - LV V1 MAX: 102.4 CM/SEC
BH CV ECHO MEAS - LV V1 VTI: 19.2 CM
BH CV ECHO MEAS - LVIDD: 4.7 CM
BH CV ECHO MEAS - LVIDS: 3.5 CM
BH CV ECHO MEAS - LVOT AREA: 3.1 CM2
BH CV ECHO MEAS - LVOT DIAM: 2 CM
BH CV ECHO MEAS - LVPWD: 0.8 CM
BH CV ECHO MEAS - MED PEAK E' VEL: 10.1 CM/SEC
BH CV ECHO MEAS - MV A MAX VEL: 70.7 CM/SEC
BH CV ECHO MEAS - MV DEC SLOPE: 394 CM/SEC2
BH CV ECHO MEAS - MV DEC TIME: 0.18 SEC
BH CV ECHO MEAS - MV E MAX VEL: 70.7 CM/SEC
BH CV ECHO MEAS - MV E/A: 1
BH CV ECHO MEAS - PA ACC TIME: 0.13 SEC
BH CV ECHO MEAS - SV(LVOT): 60.3 ML
BH CV ECHO MEAS - SV(MOD-SP2): 25.7 ML
BH CV ECHO MEAS - SV(MOD-SP4): 28.3 ML
BH CV ECHO MEAS - SVI(LVOT): 32.6 ML/M2
BH CV ECHO MEAS - SVI(MOD-SP2): 13.9 ML/M2
BH CV ECHO MEAS - SVI(MOD-SP4): 15.3 ML/M2
BH CV ECHO MEAS - TAPSE (>1.6): 1.9 CM
BH CV ECHO MEASUREMENTS AVERAGE E/E' RATIO: 6.28
LEFT ATRIUM VOLUME INDEX: 15.9 ML/M2

## 2024-12-16 ENCOUNTER — TELEPHONE (OUTPATIENT)
Dept: ONCOLOGY | Facility: CLINIC | Age: 49
End: 2024-12-16
Payer: COMMERCIAL

## 2024-12-16 NOTE — TELEPHONE ENCOUNTER
Caller: Jie Aragon Yanelis    Relationship to patient: Self    Best call back number: [phone]    Chief complaint: SCHEDULING    Type of visit: LAB, FOLLOW UP    Requested date: NEXT AVLIABLE END OF JANUARY OTHER THAN 1-20    If rescheduling, when is the original appointment: 2-6-2025

## 2024-12-18 ENCOUNTER — OFFICE VISIT (OUTPATIENT)
Dept: PULMONOLOGY | Facility: CLINIC | Age: 49
End: 2024-12-18
Payer: COMMERCIAL

## 2024-12-18 VITALS
WEIGHT: 155 LBS | TEMPERATURE: 96.9 F | HEART RATE: 102 BPM | BODY MASS INDEX: 22.96 KG/M2 | OXYGEN SATURATION: 95 % | HEIGHT: 69 IN | SYSTOLIC BLOOD PRESSURE: 142 MMHG | DIASTOLIC BLOOD PRESSURE: 82 MMHG

## 2024-12-18 DIAGNOSIS — F17.200 SMOKER: ICD-10-CM

## 2024-12-18 DIAGNOSIS — G47.34 NOCTURNAL HYPOXEMIA: ICD-10-CM

## 2024-12-18 DIAGNOSIS — R06.02 SOB (SHORTNESS OF BREATH): Primary | ICD-10-CM

## 2024-12-18 NOTE — PROGRESS NOTES
Subjective    Jie Aragon presents for the following Pulmonary emphysema, unspecified emphysema type  .    History of Present Illness   Were you born premature?  no    Any Childhood infections? no      Breathing problems when you were a child? no    Any childhood allergies?    no             At what age did you begin smoking? yes    Smoking marijuana? no    Any IV drugs? no    How many packs per day? 1  Started smoking at of 15 and smoked 2 packs per day - till may 2024   Then 1 ppd     Lung Function Test? no  Chest X-Ray? no    CT Chest? yes Allergy Test? no    Family hx of Lung disease or Lung Cancer?no    If FHx is posivitive for lung cancer, what is the relationship of the family member?     Any hospitalization in the last year? yes    How far can you walk without getting short of breath? Not very far.     Any coughing? yes    Any wheezing? yes    Acid Reflux? no    Do you snore? yes    Daytime Fatigue? yes    Any pets? no   Any pet allergies? 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 no    What inhalers are you currently using? Albuterol and Breztri    Have you had the Influenza Vaccine? no   Would you like to receive this Vaccine today? no    Have you had the Pneumonia Vaccine?  no  Would you like to receive this Vaccine today? No    On oxygen at night - 2 liters since nov 2024     Above-mentioned questionnaire was reviewed and completed and discussed with patient.  Review of Systems  Positive for sob otherwise negative    Active Problems:  Problem List Items Addressed This Visit          Pulmonary and Pneumonias    SOB (shortness of breath) - Primary    Relevant Orders    Complete PFT - Pre & Post Bronchodilator    Walking Oximetry (Completed)       Sleep    Nocturnal hypoxemia       Tobacco    Smoker       Past Medical History:  Past Medical History:   Diagnosis Date    Change in vision     Multiple gestation     Ovarian cancer  05/24/2024    Wears dentures     UPPER AND LOWER       Family History:  Family History   Problem Relation Age of Onset    Deep vein thrombosis Mother     Stroke Father     Hypertension Father     Heart attack Father     Leukemia Brother     Testicular cancer Son     Breast cancer Neg Hx        Social History:  Social History     Tobacco Use    Smoking status: Every Day     Current packs/day: 1.00     Average packs/day: 1 pack/day for 15.0 years (15.0 ttl pk-yrs)     Types: Cigarettes     Passive exposure: Current    Smokeless tobacco: Never   Substance Use Topics    Alcohol use: Never       Current Medications:  Current Outpatient Medications   Medication Sig Dispense Refill    atorvastatin (LIPITOR) 20 MG tablet Take 1 tablet by mouth Daily. 30 tablet 5    gabapentin (NEURONTIN) 100 MG capsule Take 1 capsule by mouth 3 Times a Day. 90 capsule 3    granisetron (SANCUSO) 3.1 MG/24HR Place 1 patch on the skin as directed by provider 1 (One) Time Per Week. (Patient taking differently: Place 1 patch on the skin as directed by provider 1 (One) Time Per Week. Only with Chemo) 4 patch 0    ibuprofen (ADVIL,MOTRIN) 800 MG tablet Take 1 tablet by mouth Every 8 (Eight) Hours As Needed for Mild Pain. 30 tablet 1    lidocaine-prilocaine (EMLA) 2.5-2.5 % cream Apply to port-a-cath site 30 minutes prior to arrival at infusion center. Cover with plastic wrap. 30 g 1    metoprolol succinate XL (Toprol XL) 25 MG 24 hr tablet Take 1 tablet by mouth Daily. 30 tablet 5    O2 (OXYGEN) Inhale 2 L/min Every Night.      OLANZapine (ZyPREXA) 5 MG tablet Take 1 tablet by mouth Every Night. Take nightly x 4 starting night of chemotherapy. 4 tablet 5    predniSONE (DELTASONE) 20 MG tablet Take 1 tablet by mouth 3 (Three) Times a Day With Meals. 15 tablet 0    prochlorperazine (COMPAZINE) 10 MG tablet Take 1 tablet by mouth Every 6 (Six) Hours As Needed for Nausea or Vomiting. 30 tablet 1    pyridoxine (VITAMIN B-6) 50 MG tablet tablet Take 1  "tablet by mouth Daily. 30 tablet 3    sertraline (Zoloft) 50 MG tablet Take 1 tablet by mouth Daily. 30 tablet 3    loratadine (CLARITIN) 10 MG tablet Take 1 tablet by mouth weekly starting the day of growth factor injection. (Patient not taking: Reported on 12/18/2024) 30 tablet 5    metoprolol tartrate (LOPRESSOR) 50 MG tablet Take 1 tablet by mouth 1 (One) Time for 1 dose. Take 50 mg One (1) Hour Prior to Coronary CTA 1 tablet 0     No current facility-administered medications for this visit.       Allergies:  Allergies   Allergen Reactions    Amoxicillin Other (See Comments)     unknown    Penicillins Other (See Comments)     unknown       Vitals:  /82   Pulse 102   Temp 96.9 °F (36.1 °C)   Ht 175.3 cm (69.02\")   Wt 70.3 kg (155 lb)   SpO2 95%   BMI 22.88 kg/m²     Imaging:    Imaging Results (Most Recent)       None            Pulmonary Functions Testing Results:    No results found for: \"FEV1\", \"FVC\", \"MSN6KUE\", \"TLC\", \"DLCO\"    Results for orders placed or performed during the hospital encounter of 12/05/24   Adult Transthoracic Echo Complete w/ Color, Spectral and Contrast if necessary per protocol    Collection Time: 12/05/24 10:35 AM   Result Value Ref Range    EF(MOD-bp) 56.6 %    LVIDd 4.7 cm    LVIDs 3.5 cm    IVSd 0.90 cm    LVPWd 0.80 cm    FS 25.5 %    IVS/LVPW 1.13 cm    ESV(cubed) 42.9 ml    LV Sys Vol (BSA corrected) 10.5 cm2    EDV(cubed) 103.8 ml    LV Hicks Vol (BSA corrected) 25.9 cm2    LV mass(C)d 132.3 grams    LVOT area 3.1 cm2    LVOT diam 2.00 cm    EDV(MOD-sp2) 49.6 ml    EDV(MOD-sp4) 47.8 ml    ESV(MOD-sp2) 23.9 ml    ESV(MOD-sp4) 19.5 ml    SV(MOD-sp2) 25.7 ml    SV(MOD-sp4) 28.3 ml    SVi(MOD-SP2) 13.9 ml/m2    SVi(MOD-SP4) 15.3 ml/m2    SVi (LVOT) 32.6 ml/m2    EF(MOD-sp2) 51.8 %    EF(MOD-sp4) 59.2 %    MV E max gee 70.7 cm/sec    MV A max gee 70.7 cm/sec    MV dec time 0.18 sec    MV E/A 1.00     LA ESV Index (BP) 15.9 ml/m2    Med Peak E' Gee 10.1 cm/sec    Lat Peak E' " Gee 12.4 cm/sec    Avg E/e' ratio 6.28     SV(LVOT) 60.3 ml    TAPSE (>1.6) 1.90 cm    LA dimension (2D)  3.0 cm    LV V1 max 102.4 cm/sec    LV V1 max PG 4.2 mmHg    LV V1 mean PG 2.00 mmHg    LV V1 VTI 19.2 cm    Ao pk gee 116.0 cm/sec    Ao max PG 5.4 mmHg    Ao mean PG 3.0 mmHg    Ao V2 VTI 22.2 cm    GABINO(I,D) 2.7 cm2    MV dec slope 394.0 cm/sec2    PA acc time 0.13 sec    Ao root diam 3.2 cm    ACS 2.30 cm       Objective   Physical Exam  General- normal in appearance, not in any acute distress    HEENT- pupils equally reactive to light, normal in size, no scleral icterus    Neck-supple    Respiratory-respirations normal-on auscultation no wheezing no crackles,   Decreased breath sounds     Cardiovascular-  Normal S1 and S2. No S3, S4 or murmurs. No JVD, no carotid bruit and no edema, pulses normal bilaterally     GI-nontender nondistended bowel sounds positive    CNS-nonfocal    Musculoskeletal -no edema  Extremities- no obvious deformity noticed     Psychiatric-mood good, good eye contact, alert awake oriented  Skin- no visible rash      Assessment & Plan   Shortness of breath-likely due to underlying COPD.has been smoker from a long time.  Smoked 2 packs for 36 years.  Currently smoking 1 pack/day.    Currently using albuterol and Breztri.  Asked to rinse her mouth after using the steroid inhaler.    Nocturnal hypoxemia-continue oxygen.   was educated that if  does not use oxygen  can feel lightheaded dizzy and can't pass out hit head and can die.    Patient understood the conversation very well and will try and use oxygen all the time.     Smoker -did extensive smoking cessation counseling.  Advised not to smoke when using oxygen.    Do not smoke on oxygen     Doesnot want pneumonia and flu vaccine     Ambulatory pulse oximetry-did ambulatory pulse oximetry in the office and patient maintained a saturation around 95%.      ICD-10-CM ICD-9-CM   1. SOB (shortness of breath)  R06.02 786.05   2. Smoker   F17.200 305.1   3. Nocturnal hypoxemia  G47.34 327.24       Return in about 8 weeks (around 2/12/2025).

## 2025-01-02 ENCOUNTER — OFFICE VISIT (OUTPATIENT)
Dept: CARDIOLOGY | Facility: CLINIC | Age: 50
End: 2025-01-02
Payer: COMMERCIAL

## 2025-01-02 VITALS
SYSTOLIC BLOOD PRESSURE: 155 MMHG | DIASTOLIC BLOOD PRESSURE: 76 MMHG | RESPIRATION RATE: 18 BRPM | WEIGHT: 155.6 LBS | HEIGHT: 69 IN | HEART RATE: 87 BPM | BODY MASS INDEX: 23.05 KG/M2 | OXYGEN SATURATION: 98 %

## 2025-01-02 DIAGNOSIS — R00.2 PALPITATIONS: ICD-10-CM

## 2025-01-02 DIAGNOSIS — C56.2 MALIGNANT NEOPLASM OF LEFT OVARY: ICD-10-CM

## 2025-01-02 DIAGNOSIS — E78.5 HYPERLIPIDEMIA LDL GOAL <70: ICD-10-CM

## 2025-01-02 DIAGNOSIS — Z72.0 TOBACCO ABUSE: ICD-10-CM

## 2025-01-02 DIAGNOSIS — R06.09 DOE (DYSPNEA ON EXERTION): Primary | ICD-10-CM

## 2025-01-02 DIAGNOSIS — U07.1 COVID-19: ICD-10-CM

## 2025-01-02 RX ORDER — METOPROLOL SUCCINATE 50 MG/1
50 TABLET, EXTENDED RELEASE ORAL DAILY
Qty: 90 TABLET | Refills: 3 | Status: SHIPPED | OUTPATIENT
Start: 2025-01-02

## 2025-01-02 RX ORDER — ATORVASTATIN CALCIUM 20 MG/1
20 TABLET, FILM COATED ORAL DAILY
Qty: 90 TABLET | Refills: 3 | Status: SHIPPED | OUTPATIENT
Start: 2025-01-02

## 2025-01-02 NOTE — PROGRESS NOTES
Adela Gtz, ESTHER  No ref. provider found    Jie Aragon  1975 11/20/2024    Subjective     Jie Aragon is a 49 y.o. female who presents today to [facility] CARDIOLOGY for Follow-up (ECHO, monitor and CA ).    History of Present Illness:    49-year-old female with history of stage II ovarian cancer status post bilateral salpingo-oophorectomy and hysterectomy and completion of chemotherapy with carboplatin and paclitaxel, 30-pack-year smoking history comes in today for evaluation of palpitations and to establish care.    Patient reports that back in May 2024 she found a mass in her lower abdomen for which she saw a doctor and was diagnosed with stage II ovarian cancer.  She was referred to an oncologist in [place] and underwent surgical removal of her ovaries and uterus followed by chemotherapy.  She had an episode of COVID-19 infection back in August 2024 for which she was hospitalized.  About 2 weeks ago patient started noticing that her heart is beating fast.  She was recently at her doctor who noticed her heart rate to be up in 140s for which she was sent to emergency room.  Referred to cardiology for the evaluation of the palpitations.  She had a CT pulmonary embolism protocol in the ED which did not show any evidence of PE.  Patient reports that she gets short of breath on minimal exertion.  She was previously getting dizzy and lightheaded while she was getting chemotherapy but denies any more episodes of dizziness or lightheadedness associated with these palpitations.     Denies having any allergies.  Family history is positive for heart attack and a maternal uncle who passed away at age of 61.  She is an active smoker and is currently trying to quit.  Denies drinking alcohol or substance use.    Today's visit 1/2/2025  Patient reports that overall she has noticed significant improvement in her symptoms of palpitations on the current dose of  metoprolol but she still has some episodes of palpitations.  She otherwise denies having any exertional chest pain or shortness of breath.  She underwent CT coronary angiogram on 12/7/2024 which showed less than 25% stenosis in the LAD.  Her echocardiogram done on 12/9/2024 showed normal ejection fraction with no significant valve abnormalities.  Patient reports that she returned her Holter monitor couple of weeks ago but I still have not received the results for review.    Allergies   Allergen Reactions    Amoxicillin Other (See Comments)     unknown    Penicillins Other (See Comments)     unknown   :    Current Outpatient Medications:     atorvastatin (LIPITOR) 20 MG tablet, Take 1 tablet by mouth Daily., Disp: 90 tablet, Rfl: 3    gabapentin (NEURONTIN) 100 MG capsule, Take 1 capsule by mouth 3 Times a Day., Disp: 90 capsule, Rfl: 3    ibuprofen (ADVIL,MOTRIN) 800 MG tablet, Take 1 tablet by mouth Every 8 (Eight) Hours As Needed for Mild Pain., Disp: 30 tablet, Rfl: 1    lidocaine-prilocaine (EMLA) 2.5-2.5 % cream, Apply to port-a-cath site 30 minutes prior to arrival at infusion center. Cover with plastic wrap., Disp: 30 g, Rfl: 1    O2 (OXYGEN), Inhale 2 L/min Every Night., Disp: , Rfl:     OLANZapine (ZyPREXA) 5 MG tablet, Take 1 tablet by mouth Every Night. Take nightly x 4 starting night of chemotherapy., Disp: 4 tablet, Rfl: 5    predniSONE (DELTASONE) 20 MG tablet, Take 1 tablet by mouth 3 (Three) Times a Day With Meals., Disp: 15 tablet, Rfl: 0    prochlorperazine (COMPAZINE) 10 MG tablet, Take 1 tablet by mouth Every 6 (Six) Hours As Needed for Nausea or Vomiting., Disp: 30 tablet, Rfl: 1    pyridoxine (VITAMIN B-6) 50 MG tablet tablet, Take 1 tablet by mouth Daily., Disp: 30 tablet, Rfl: 3    sertraline (Zoloft) 50 MG tablet, Take 1 tablet by mouth Daily., Disp: 30 tablet, Rfl: 3    granisetron (SANCUSO) 3.1 MG/24HR, Place 1 patch on the skin as directed by provider 1 (One) Time Per Week. (Patient not  taking: Reported on 1/2/2025), Disp: 4 patch, Rfl: 0    loratadine (CLARITIN) 10 MG tablet, Take 1 tablet by mouth weekly starting the day of growth factor injection. (Patient not taking: Reported on 11/20/2024), Disp: 30 tablet, Rfl: 5    metoprolol succinate XL (TOPROL-XL) 50 MG 24 hr tablet, Take 1 tablet by mouth Daily., Disp: 90 tablet, Rfl: 3    Past Medical History:   Diagnosis Date    Abnormal ECG     Asthma 11/2024    Cancer 05/02/2024    Ovarian cancer stage 2    Change in vision     COPD (chronic obstructive pulmonary disease) 11/2024    On oxygen at night    Multiple gestation     Ovarian cancer 05/24/2024    Wears dentures     UPPER AND LOWER     Past Surgical History:   Procedure Laterality Date    APPENDECTOMY N/A 5/23/2024    Procedure: APPENDECTOMY;  Surgeon: Shanda Dubois MD;  Location:  [facility];  Service: Gynecology Oncology;  Laterality: N/A;    CYSTOSCOPY Bilateral 5/23/2024    Procedure: CYSTOSCOPY TEMPORARY PLACEMENT BILATERAL URETERAL CATHETER;  Surgeon: Shanda Dubois MD;  Location:  [facility];  Service: Gynecology Oncology;  Laterality: Bilateral;    EXPLORATORY LAPAROTOMY, TOTAL ABDOMINAL HYSTERECTOMY SALPINGO OOPHORECTOMY N/A 5/23/2024    Procedure: EXPLORATORY LAPAROTOMY, TOTAL ABDOMINAL HYSTERECTOMY BILATERAL SALPINGO OOPHORECTOMY, CANCER STAGING/ OPTIMAL DEBULKLING (R=0);  Surgeon: Shanda Dubois MD;  Location:  [facility];  Service: Gynecology Oncology;  Laterality: N/A;    OMENTECTOMY N/A 5/23/2024    Procedure: OMENTECTOMY;  Surgeon: Shanda Dubois MD;  Location:  [facility];  Service: Gynecology Oncology;  Laterality: N/A;    PORTACATH PLACEMENT Right 6/24/2024    Procedure: INSERTION OF PORTACATH;  Surgeon: Yakov Chung MD;  Location:  [facility];  Service: General;  Laterality: Right;    TUBAL ABDOMINAL LIGATION       Family History   Problem Relation Age of Onset    Deep vein thrombosis Mother     Stroke Father     Hypertension Father     Heart attack Father      "Leukemia Brother     Testicular cancer Son     Breast cancer Neg Hx      Social History     Tobacco Use    Smoking status: Former     Current packs/day: 0.00     Average packs/day: 1 pack/day for 30.0 years (30.0 ttl pk-yrs)     Types: Cigarettes     Start date: 1995     Quit date: 12/31/2024     Passive exposure: Current    Smokeless tobacco: Never   Vaping Use    Vaping status: Never Used   Substance Use Topics    Alcohol use: Never    Drug use: Never       Objective   Blood pressure 155/76, pulse 87, resp. rate 18, height 175.3 cm (69.02\"), weight 70.6 kg (155 lb 9.6 oz), SpO2 98%.  Body mass index is 22.96 kg/m².    Constitutional:       Appearance: Not in distress.   Pulmonary:      Breath sounds: Normal breath sounds.   Cardiovascular:      Normal rate. Regular rhythm. Normal S1. Normal S2.       Murmurs: There is no murmur.   Edema:     Peripheral edema absent.   Skin:     General: Skin is warm.   Neurological:      General: No focal deficit present.           DATA:   Results for orders placed during the hospital encounter of 12/05/24    Adult Transthoracic Echo Complete w/ Color, Spectral and Contrast if necessary per protocol    Interpretation Summary    Left ventricular systolic function is normal. Calculated left ventricular EF = 56.6% Left ventricular ejection fraction appears to be 56 - 60%.    Left ventricular diastolic function was normal.    Normal right ventricular systolic function noted.    No hemodynamically significant valvular abnormalities noted.    There is no evidence of pericardial effusion.          No results found for: \"BNP\"  No results found for: \"PSA\"   Lab Results   Component Value Date    MG 2.0 11/13/2024     No results found for: \"INR\"  Lab Results   Component Value Date    CKTOTAL 35 08/25/2024     Lab Results   Component Value Date    CHOL 236 (H) 08/26/2024     Lab Results   Component Value Date    TRIG 73 08/26/2024     Lab Results   Component Value Date    HDL 40 08/26/2024 " "    Lab Results   Component Value Date     (H) 08/26/2024     No components found for: \"A1C\"      Lab Results   Component Value Date    TSH 2.860 11/11/2024             Invalid input(s): \"LABALBU\", \"PROT\"            Results review: During today's encounter, all relevant clinical data has been reviewed.      Procedures    Assessment & Plan    Diagnosis Plan   1. PICKARD (dyspnea on exertion)  Lipid Panel      2. Palpitations  metoprolol succinate XL (TOPROL-XL) 50 MG 24 hr tablet      3. Malignant neoplasm of left ovary        4. Tobacco abuse        5. Hyperlipidemia LDL goal <70  Lipid Panel    Comprehensive Metabolic Panel    atorvastatin (LIPITOR) 20 MG tablet      6. COVID-19              Recommendations  Orders Placed This Encounter   Procedures    Lipid Panel    Comprehensive Metabolic Panel        For the symptoms of palpitations, we will increase the dose of Toprol-XL to 50 mg p.o. once daily since she continues to have palpitations.  For her dyspnea on exertion, long-term history of smoking and significant hyperlipidemia.  CT coronary angiogram is showing less than 25% stenosis in the LAD.  Will continue medical management with Lipitor 20 mg p.o. once daily.  Goal of LDL is less than 70.  Will check her CMP and lipid panel before next visit.  Patient reports that she has quit smoking.  Encouraged the patient to continue to not smoke again.  Patient is currently optimized for colonoscopy.  She lies in low risk category for cardiovascular complication during the procedure.   -- There is no further cardiac work up or optimization indicated prior to the planned procedure.        New Medications:   New Medications Ordered This Visit   Medications    metoprolol succinate XL (TOPROL-XL) 50 MG 24 hr tablet     Sig: Take 1 tablet by mouth Daily.     Dispense:  90 tablet     Refill:  3    atorvastatin (LIPITOR) 20 MG tablet     Sig: Take 1 tablet by mouth Daily.     Dispense:  90 tablet     Refill:  3 "       Discontinued Medications:   Medications Discontinued During This Encounter   Medication Reason    metoprolol succinate XL (Toprol XL) 25 MG 24 hr tablet Dose adjustment    metoprolol tartrate (LOPRESSOR) 50 MG tablet *Therapy completed    atorvastatin (LIPITOR) 20 MG tablet Reorder        Return in about 3 months (around 4/2/2025).      Thank you for allowing me to participate in the care of Jie Aragon. Feel free to contact me directly with any further questions or concerns.      This document has been electronically signed by Arminda Christian MD   January 2, 2025 12:20 EST    Dictated Utilizing Dragon Dictation: Part of this note may be an electronic transcription/translation of spoken language to printed text using the Dragon Dictation System.      She has history of cancer

## 2025-01-07 ENCOUNTER — INFUSION (OUTPATIENT)
Dept: ONCOLOGY | Facility: HOSPITAL | Age: 50
End: 2025-01-07
Payer: COMMERCIAL

## 2025-01-07 VITALS
OXYGEN SATURATION: 98 % | RESPIRATION RATE: 18 BRPM | SYSTOLIC BLOOD PRESSURE: 110 MMHG | HEART RATE: 96 BPM | DIASTOLIC BLOOD PRESSURE: 73 MMHG | TEMPERATURE: 97.5 F

## 2025-01-07 DIAGNOSIS — Z95.828 PORT-A-CATH IN PLACE: Primary | ICD-10-CM

## 2025-01-07 DIAGNOSIS — E78.5 HYPERLIPIDEMIA LDL GOAL <70: ICD-10-CM

## 2025-01-07 DIAGNOSIS — R06.09 DOE (DYSPNEA ON EXERTION): ICD-10-CM

## 2025-01-07 LAB
ALBUMIN SERPL-MCNC: 4.1 G/DL (ref 3.5–5.2)
ALBUMIN/GLOB SERPL: 1.5 G/DL
ALP SERPL-CCNC: 106 U/L (ref 39–117)
ALT SERPL W P-5'-P-CCNC: 30 U/L (ref 1–33)
ANION GAP SERPL CALCULATED.3IONS-SCNC: 10.8 MMOL/L (ref 5–15)
AST SERPL-CCNC: 21 U/L (ref 1–32)
BILIRUB SERPL-MCNC: <0.2 MG/DL (ref 0–1.2)
BUN SERPL-MCNC: 14 MG/DL (ref 6–20)
BUN/CREAT SERPL: 20 (ref 7–25)
CALCIUM SPEC-SCNC: 9.4 MG/DL (ref 8.6–10.5)
CHLORIDE SERPL-SCNC: 103 MMOL/L (ref 98–107)
CHOLEST SERPL-MCNC: 277 MG/DL (ref 0–200)
CO2 SERPL-SCNC: 24.2 MMOL/L (ref 22–29)
CREAT SERPL-MCNC: 0.7 MG/DL (ref 0.57–1)
EGFRCR SERPLBLD CKD-EPI 2021: 105.5 ML/MIN/1.73
GLOBULIN UR ELPH-MCNC: 2.7 GM/DL
GLUCOSE SERPL-MCNC: 111 MG/DL (ref 65–99)
HDLC SERPL-MCNC: 46 MG/DL (ref 40–60)
LDLC SERPL CALC-MCNC: 192 MG/DL (ref 0–100)
LDLC/HDLC SERPL: 4.13 {RATIO}
POTASSIUM SERPL-SCNC: 4 MMOL/L (ref 3.5–5.2)
PROT SERPL-MCNC: 6.8 G/DL (ref 6–8.5)
SODIUM SERPL-SCNC: 138 MMOL/L (ref 136–145)
TRIGL SERPL-MCNC: 204 MG/DL (ref 0–150)
VLDLC SERPL-MCNC: 39 MG/DL (ref 5–40)

## 2025-01-07 PROCEDURE — 80053 COMPREHEN METABOLIC PANEL: CPT

## 2025-01-07 PROCEDURE — 36591 DRAW BLOOD OFF VENOUS DEVICE: CPT

## 2025-01-07 PROCEDURE — 25010000002 HEPARIN LOCK FLUSH PER 10 UNITS

## 2025-01-07 PROCEDURE — 80061 LIPID PANEL: CPT

## 2025-01-07 RX ORDER — SODIUM CHLORIDE 0.9 % (FLUSH) 0.9 %
10 SYRINGE (ML) INJECTION AS NEEDED
Status: DISCONTINUED | OUTPATIENT
Start: 2025-01-07 | End: 2025-01-07 | Stop reason: HOSPADM

## 2025-01-07 RX ORDER — HEPARIN SODIUM (PORCINE) LOCK FLUSH IV SOLN 100 UNIT/ML 100 UNIT/ML
500 SOLUTION INTRAVENOUS AS NEEDED
OUTPATIENT
Start: 2025-01-07

## 2025-01-07 RX ORDER — SODIUM CHLORIDE 0.9 % (FLUSH) 0.9 %
10 SYRINGE (ML) INJECTION AS NEEDED
OUTPATIENT
Start: 2025-01-07

## 2025-01-07 RX ORDER — HEPARIN SODIUM (PORCINE) LOCK FLUSH IV SOLN 100 UNIT/ML 100 UNIT/ML
500 SOLUTION INTRAVENOUS AS NEEDED
Status: DISCONTINUED | OUTPATIENT
Start: 2025-01-07 | End: 2025-01-07 | Stop reason: HOSPADM

## 2025-01-07 RX ADMIN — HEPARIN 500 UNITS: 100 SYRINGE at 13:15

## 2025-01-07 RX ADMIN — Medication 10 ML: at 13:15

## 2025-01-20 ENCOUNTER — HOSPITAL ENCOUNTER (OUTPATIENT)
Facility: HOSPITAL | Age: 50
Setting detail: HOSPITAL OUTPATIENT SURGERY
Discharge: HOME | End: 2025-01-20
Attending: INTERNAL MEDICINE | Admitting: INTERNAL MEDICINE
Payer: COMMERCIAL

## 2025-01-20 ENCOUNTER — ANESTHESIA EVENT (OUTPATIENT)
Dept: PERIOP | Facility: HOSPITAL | Age: 50
End: 2025-01-20
Payer: COMMERCIAL

## 2025-01-20 ENCOUNTER — ANESTHESIA (OUTPATIENT)
Dept: PERIOP | Facility: HOSPITAL | Age: 50
End: 2025-01-20
Payer: COMMERCIAL

## 2025-01-20 VITALS
RESPIRATION RATE: 16 BRPM | TEMPERATURE: 97.2 F | HEIGHT: 69 IN | WEIGHT: 150 LBS | OXYGEN SATURATION: 97 % | DIASTOLIC BLOOD PRESSURE: 74 MMHG | BODY MASS INDEX: 22.22 KG/M2 | HEART RATE: 73 BPM | SYSTOLIC BLOOD PRESSURE: 106 MMHG

## 2025-01-20 DIAGNOSIS — Z12.11 ENCOUNTER FOR SCREENING FOR MALIGNANT NEOPLASM OF COLON: ICD-10-CM

## 2025-01-20 PROCEDURE — 25010000002 PROPOFOL 200 MG/20ML EMULSION: Performed by: NURSE ANESTHETIST, CERTIFIED REGISTERED

## 2025-01-20 PROCEDURE — 45385 COLONOSCOPY W/LESION REMOVAL: CPT | Performed by: INTERNAL MEDICINE

## 2025-01-20 RX ORDER — MEPERIDINE HYDROCHLORIDE 25 MG/ML
12.5 INJECTION INTRAMUSCULAR; INTRAVENOUS; SUBCUTANEOUS
Status: DISCONTINUED | OUTPATIENT
Start: 2025-01-20 | End: 2025-01-20 | Stop reason: HOSPADM

## 2025-01-20 RX ORDER — IPRATROPIUM BROMIDE AND ALBUTEROL SULFATE 2.5; .5 MG/3ML; MG/3ML
3 SOLUTION RESPIRATORY (INHALATION) ONCE AS NEEDED
Status: DISCONTINUED | OUTPATIENT
Start: 2025-01-20 | End: 2025-01-20 | Stop reason: HOSPADM

## 2025-01-20 RX ORDER — OXYCODONE AND ACETAMINOPHEN 5; 325 MG/1; MG/1
1 TABLET ORAL ONCE AS NEEDED
Status: DISCONTINUED | OUTPATIENT
Start: 2025-01-20 | End: 2025-01-20 | Stop reason: HOSPADM

## 2025-01-20 RX ORDER — SODIUM CHLORIDE 0.9 % (FLUSH) 0.9 %
10 SYRINGE (ML) INJECTION EVERY 12 HOURS SCHEDULED
Status: DISCONTINUED | OUTPATIENT
Start: 2025-01-20 | End: 2025-01-20 | Stop reason: HOSPADM

## 2025-01-20 RX ORDER — KETOROLAC TROMETHAMINE 30 MG/ML
30 INJECTION, SOLUTION INTRAMUSCULAR; INTRAVENOUS EVERY 6 HOURS PRN
Status: DISCONTINUED | OUTPATIENT
Start: 2025-01-20 | End: 2025-01-20 | Stop reason: HOSPADM

## 2025-01-20 RX ORDER — MIDAZOLAM HYDROCHLORIDE 1 MG/ML
1 INJECTION, SOLUTION INTRAMUSCULAR; INTRAVENOUS
Status: DISCONTINUED | OUTPATIENT
Start: 2025-01-20 | End: 2025-01-20 | Stop reason: HOSPADM

## 2025-01-20 RX ORDER — SODIUM CHLORIDE 9 MG/ML
40 INJECTION, SOLUTION INTRAVENOUS AS NEEDED
Status: DISCONTINUED | OUTPATIENT
Start: 2025-01-20 | End: 2025-01-20 | Stop reason: HOSPADM

## 2025-01-20 RX ORDER — FENTANYL CITRATE 50 UG/ML
50 INJECTION, SOLUTION INTRAMUSCULAR; INTRAVENOUS
Status: DISCONTINUED | OUTPATIENT
Start: 2025-01-20 | End: 2025-01-20 | Stop reason: HOSPADM

## 2025-01-20 RX ORDER — SODIUM CHLORIDE, SODIUM LACTATE, POTASSIUM CHLORIDE, CALCIUM CHLORIDE 600; 310; 30; 20 MG/100ML; MG/100ML; MG/100ML; MG/100ML
125 INJECTION, SOLUTION INTRAVENOUS ONCE
Status: DISCONTINUED | OUTPATIENT
Start: 2025-01-20 | End: 2025-01-20 | Stop reason: HOSPADM

## 2025-01-20 RX ORDER — PROPOFOL 10 MG/ML
INJECTION, EMULSION INTRAVENOUS AS NEEDED
Status: DISCONTINUED | OUTPATIENT
Start: 2025-01-20 | End: 2025-01-20 | Stop reason: SURG

## 2025-01-20 RX ORDER — SODIUM CHLORIDE 0.9 % (FLUSH) 0.9 %
10 SYRINGE (ML) INJECTION AS NEEDED
Status: DISCONTINUED | OUTPATIENT
Start: 2025-01-20 | End: 2025-01-20 | Stop reason: HOSPADM

## 2025-01-20 RX ORDER — ONDANSETRON 2 MG/ML
4 INJECTION INTRAMUSCULAR; INTRAVENOUS AS NEEDED
Status: DISCONTINUED | OUTPATIENT
Start: 2025-01-20 | End: 2025-01-20 | Stop reason: HOSPADM

## 2025-01-20 RX ADMIN — PROPOFOL 100 MG: 10 INJECTION, EMULSION INTRAVENOUS at 08:37

## 2025-01-20 RX ADMIN — PROPOFOL 100 MG: 10 INJECTION, EMULSION INTRAVENOUS at 08:45

## 2025-01-20 RX ADMIN — PROPOFOL 100 MG: 10 INJECTION, EMULSION INTRAVENOUS at 08:31

## 2025-01-20 RX ADMIN — PROPOFOL 100 MG: 10 INJECTION, EMULSION INTRAVENOUS at 08:55

## 2025-01-20 NOTE — H&P
GASTROENTEROLOGY OFFICE NOTE  Jie Aragon  [id number]  1975      CHIEF COMPLAINT   Patient presents for colon cancer screening    HISTORY OF PRESENT ILLNESS:  49-year-old white female who I am seeing for the first time today.  She presents for colon cancer screening.  History of stage II ovarian cancer status post 6 bilateral salpingo-oophorectomy and hysterectomy and subsequent chemotherapy with carboplatinum and paclitaxel.  She was diagnosed in May 2024.  It appears that she is in remission at this time there was little cyst noted on recent scans that is currently being followed but is apparently not felt to be malignant    GI review of system is unremarkable.  She presents without dysphagia, dyne aphasia, early satiety, unexplained weight loss, melena or bright red blood per rectum.    PAST MEDICAL HISTORY  Past Medical History:    Abnormal ECG    Asthma    Cancer    Ovarian cancer stage 2    Change in vision    COPD (chronic obstructive pulmonary disease)    On oxygen at night    Multiple gestation    Ovarian cancer    Wears dentures    UPPER AND LOWER        PAST SURGICAL HISTORY  Past Surgical History:    APPENDECTOMY    Procedure: APPENDECTOMY;  Surgeon: Shanda Dubois MD;  Location:  [facility];  Service: Gynecology Oncology;  Laterality: N/A;    CYSTOSCOPY    Procedure: CYSTOSCOPY TEMPORARY PLACEMENT BILATERAL URETERAL CATHETER;  Surgeon: Shanda Dubois MD;  Location:  [facility];  Service: Gynecology Oncology;  Laterality: Bilateral;    EXPLORATORY LAPAROTOMY, TOTAL ABDOMINAL HYSTERECTOMY SALPINGO OOPHORECTOMY    Procedure: EXPLORATORY LAPAROTOMY, TOTAL ABDOMINAL HYSTERECTOMY BILATERAL SALPINGO OOPHORECTOMY, CANCER STAGING/ OPTIMAL DEBULKLING (R=0);  Surgeon: Shanda Dubois MD;  Location:  [facility];  Service: Gynecology Oncology;  Laterality: N/A;    OMENTECTOMY    Procedure: OMENTECTOMY;  Surgeon: Shanda Dubois MD;  Location:  [facility];  Service: Gynecology Oncology;   "Laterality: N/A;    PORTACATH PLACEMENT    Procedure: INSERTION OF PORTACATH;  Surgeon: Yakov Chung MD;  Location: [facility];  Service: General;  Laterality: Right;    TUBAL ABDOMINAL LIGATION        MEDICATIONS:  O2, atorvastatin, gabapentin, ibuprofen, lidocaine-prilocaine, metoprolol succinate XL, sertraline, and vitamin B-6     ALLERGIES  is allergic to amoxicillin and penicillins.    FAMILY HISTORY:  Cancer-related family history includes Testicular cancer in her son. There is no history of Breast cancer.  Colon Cancer-related family history is not on file.    SOCIAL HISTORY  She  reports that she quit smoking about 2 weeks ago. Her smoking use included cigarettes. She started smoking about 30 years ago. She has a 30 pack-year smoking history. She has been exposed to tobacco smoke. She has never used smokeless tobacco. She reports that she does not drink alcohol and does not use drugs.   She is accompanied by her boyfriend.  They have been together for 5 years.  She has 2 daughters and 1 son.  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  Non-smoker currently with prior smoking history noted above.  Does not abuse alcohol.    REVIEW OF SYSTEMS  Cardiovascular, pulmonary and generalized review of systems are pertinent as reviewed above    PHYSICAL EXAM   /67 (BP Location: Left arm, Patient Position: Lying)   Pulse 81   Temp 97.5 °F (36.4 °C) (Temporal)   Resp 18   Ht 175.3 cm (69\")   Wt 68 kg (150 lb)   SpO2 98%   BMI 22.15 kg/m²   General: Alert and oriented x 3. In no apparent or acute distress.  and No stigmata of chronic liver disease  HEENT: Anicteric sclerae. Normal oropharynx  Neck: Supple. Without lymphadenopathy  CV: Regular rate and rhythm, S1, S2  Lungs: Clear to ausculation. Without rales, rhonchi and wheezing  Abdomen:  Soft,non-distended without palpable masses or hepatosplenomeagaly, areas of rebound tenderness or guarding.   Extremeties: " without clubbing, cyanosis or edema  Neurologic:  Alert and oriented x 3 without focal motor or sensory deficits  Rectal exam: deferred         ASSESSMENT  1.-Patient at average risk colon cancer  2.-History of stage II ovarian cancer    PLAN  1.-Proceed with colonoscopy for screening purposes.  Risks of perforation, bleeding, cardiorespiratory Carmize and missed lesions reviewed.  Ample opportunity for questions provided she wishes to proceed.      Ronni Smith MD  1/20/2025   08:17 EST    Addendum  Colonoscopy completed to the terminal ileum.  2 passes were made.  Scope was retroflexed both in cecum and rectum.  2 ascending colon polyps were removed measuring 2 and 6 mm and another 6 to 7 mm polyp was removed from the sigmoid colon.  All removed with cold snare.  Pathology is pending.  Left-sided diverticulosis was noted.  Bowel prep and visualization was excellent.  Repeat colonoscopy recommended in 3 years assuming polyps prove adenomatous

## 2025-01-20 NOTE — ANESTHESIA PREPROCEDURE EVALUATION
Anesthesia Evaluation     Patient summary reviewed and Nursing notes reviewed   no history of anesthetic complications:   NPO Solid Status: > 8 hours  NPO Liquid Status: > 8 hours           Airway   Mallampati: I  TM distance: >3 FB  Neck ROM: full  No difficulty expected  Dental    (+) edentulous, upper dentures and lower dentures    Pulmonary    (+) a smoker Current, Smoked day of surgery,  (-) asthma, shortness of breath, recent URI, sleep apnea  Cardiovascular     (-) hypertension, dysrhythmias, angina, cardiac stents      Neuro/Psych  (-) seizures, CVA  GI/Hepatic/Renal/Endo    (-) no renal disease, diabetes, no thyroid disorder    Musculoskeletal     Abdominal    Substance History      OB/GYN    (-)  Pregnant    Comment: CT ; bilateral pelvic masses likely ovarian. Right measures [id number], and left 11x5cm with septations.  is 65      Other      history of cancer active    ROS/Med Hx Other: Oxycodone for ABD/ BACK PAIN   LABS wnl                   Anesthesia Plan    ASA 3     general     (  )  intravenous induction     Anesthetic plan, risks, benefits, and alternatives have been provided, discussed and informed consent has been obtained with: patient.    Plan discussed with CRNA.      CODE STATUS:

## 2025-01-21 ENCOUNTER — OFFICE VISIT (OUTPATIENT)
Dept: ONCOLOGY | Facility: CLINIC | Age: 50
End: 2025-01-21
Payer: COMMERCIAL

## 2025-01-21 ENCOUNTER — LAB (OUTPATIENT)
Dept: ONCOLOGY | Facility: CLINIC | Age: 50
End: 2025-01-21
Payer: COMMERCIAL

## 2025-01-21 VITALS
OXYGEN SATURATION: 90 % | BODY MASS INDEX: 22.69 KG/M2 | DIASTOLIC BLOOD PRESSURE: 63 MMHG | HEIGHT: 69 IN | TEMPERATURE: 98 F | HEART RATE: 90 BPM | RESPIRATION RATE: 20 BRPM | WEIGHT: 153.2 LBS | SYSTOLIC BLOOD PRESSURE: 90 MMHG

## 2025-01-21 DIAGNOSIS — C56.3 MALIGNANT NEOPLASM OF BOTH OVARIES: Primary | ICD-10-CM

## 2025-01-21 DIAGNOSIS — G62.9 NEUROPATHY: ICD-10-CM

## 2025-01-21 DIAGNOSIS — C56.3 MALIGNANT NEOPLASM OF BOTH OVARIES: ICD-10-CM

## 2025-01-21 DIAGNOSIS — R00.2 PALPITATIONS: ICD-10-CM

## 2025-01-21 LAB
ALBUMIN SERPL-MCNC: 4.5 G/DL (ref 3.5–5.2)
ALBUMIN/GLOB SERPL: 1.6 G/DL
ALP SERPL-CCNC: 137 U/L (ref 39–117)
ALT SERPL W P-5'-P-CCNC: 29 U/L (ref 1–33)
ANION GAP SERPL CALCULATED.3IONS-SCNC: 11.4 MMOL/L (ref 5–15)
AST SERPL-CCNC: 17 U/L (ref 1–32)
BASOPHILS # BLD AUTO: 0.03 10*3/MM3 (ref 0–0.2)
BASOPHILS NFR BLD AUTO: 0.4 % (ref 0–1.5)
BILIRUB SERPL-MCNC: 0.3 MG/DL (ref 0–1.2)
BUN SERPL-MCNC: 16 MG/DL (ref 6–20)
BUN/CREAT SERPL: 19.5 (ref 7–25)
CALCIUM SPEC-SCNC: 10 MG/DL (ref 8.6–10.5)
CANCER AG125 SERPL QL: 7.1 U/ML (ref 0–38.1)
CHLORIDE SERPL-SCNC: 103 MMOL/L (ref 98–107)
CO2 SERPL-SCNC: 22.6 MMOL/L (ref 22–29)
CREAT SERPL-MCNC: 0.82 MG/DL (ref 0.57–1)
DEPRECATED RDW RBC AUTO: 46 FL (ref 37–54)
EGFRCR SERPLBLD CKD-EPI 2021: 87.3 ML/MIN/1.73
EOSINOPHIL # BLD AUTO: 0.06 10*3/MM3 (ref 0–0.4)
EOSINOPHIL NFR BLD AUTO: 0.8 % (ref 0.3–6.2)
ERYTHROCYTE [DISTWIDTH] IN BLOOD BY AUTOMATED COUNT: 12.9 % (ref 12.3–15.4)
GLOBULIN UR ELPH-MCNC: 2.8 GM/DL
GLUCOSE SERPL-MCNC: 112 MG/DL (ref 65–99)
HCT VFR BLD AUTO: 43.8 % (ref 34–46.6)
HGB BLD-MCNC: 14.3 G/DL (ref 12–15.9)
IMM GRANULOCYTES # BLD AUTO: 0.03 10*3/MM3 (ref 0–0.05)
IMM GRANULOCYTES NFR BLD AUTO: 0.4 % (ref 0–0.5)
LYMPHOCYTES # BLD AUTO: 1.39 10*3/MM3 (ref 0.7–3.1)
LYMPHOCYTES NFR BLD AUTO: 18.2 % (ref 19.6–45.3)
MCH RBC QN AUTO: 31.9 PG (ref 26.6–33)
MCHC RBC AUTO-ENTMCNC: 32.6 G/DL (ref 31.5–35.7)
MCV RBC AUTO: 97.8 FL (ref 79–97)
MONOCYTES # BLD AUTO: 0.41 10*3/MM3 (ref 0.1–0.9)
MONOCYTES NFR BLD AUTO: 5.4 % (ref 5–12)
NEUTROPHILS NFR BLD AUTO: 5.71 10*3/MM3 (ref 1.7–7)
NEUTROPHILS NFR BLD AUTO: 74.8 % (ref 42.7–76)
NRBC BLD AUTO-RTO: 0 /100 WBC (ref 0–0.2)
PLATELET # BLD AUTO: 320 10*3/MM3 (ref 140–450)
PMV BLD AUTO: 9 FL (ref 6–12)
POTASSIUM SERPL-SCNC: 4.5 MMOL/L (ref 3.5–5.2)
PROT SERPL-MCNC: 7.3 G/DL (ref 6–8.5)
RBC # BLD AUTO: 4.48 10*6/MM3 (ref 3.77–5.28)
REF LAB TEST METHOD: NORMAL
SODIUM SERPL-SCNC: 137 MMOL/L (ref 136–145)
WBC NRBC COR # BLD AUTO: 7.63 10*3/MM3 (ref 3.4–10.8)

## 2025-01-21 PROCEDURE — 80053 COMPREHEN METABOLIC PANEL: CPT | Performed by: INTERNAL MEDICINE

## 2025-01-21 PROCEDURE — 85025 COMPLETE CBC W/AUTO DIFF WBC: CPT | Performed by: INTERNAL MEDICINE

## 2025-01-21 PROCEDURE — 86304 IMMUNOASSAY TUMOR CA 125: CPT | Performed by: INTERNAL MEDICINE

## 2025-01-21 RX ORDER — NICOTINE 21 MG/24HR
1 PATCH, TRANSDERMAL 24 HOURS TRANSDERMAL EVERY 24 HOURS
Qty: 30 EACH | Refills: 3 | Status: SHIPPED | OUTPATIENT
Start: 2025-01-21

## 2025-01-21 RX ORDER — GABAPENTIN 100 MG/1
100 CAPSULE ORAL 3 TIMES DAILY
Qty: 90 CAPSULE | Refills: 3 | Status: SHIPPED | OUTPATIENT
Start: 2025-01-21

## 2025-01-21 RX ORDER — LIDOCAINE 50 MG/G
1 PATCH TOPICAL EVERY 24 HOURS
Qty: 30 PATCH | Refills: 3 | Status: SHIPPED | OUTPATIENT
Start: 2025-01-21

## 2025-01-21 NOTE — PROGRESS NOTES
Venipuncture Blood Specimen Collection  Venipuncture performed in right arm by Lianet Mcfarland MA with good hemostasis. Patient tolerated the procedure well without complications.   01/21/25   Lianet Mcfarland MA

## 2025-01-21 NOTE — PROGRESS NOTES
Subjective     Date: 1/21/2025    Referring Provider  No ref. provider found    Chief Complaint  Malignant neoplasm of both ovaries      Cancer Staging   FIGO Stage II (cT2, cN0, cM0)  Stage IC (pT1c2, pN0, cM0)      Subjective          Jie Aragon is a 49 y.o. female who presents today to [facility] for follow up.     HPI:   49 y.o. 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     Oncology history:  April 2024: Palpated right lower abdominal mass while applying lotion in tanning salon. This was associated with abdominal pain, bloating, and nausea. Care was delayed because patient did not have insurance at the time. Eventually went to PCP who ordered CT imaging of the abdomen.  May 8 2024: CT imaging R mass 17 x 13 cm and left 11x5 cm with septations, likely ovarian.  65  May 20 2024: Seen by Dr. Dubois for consultation.   May 23 2024: Underwent exploratory laparotomy, total abdominal hysterectomy, bilateral salpingo oophorectomy, omentectomy, appendectomy. Final pathology with left ovary with high grade serous carcinoma 9.5 cm confined to the ovary with no surface involvement identified, Right ovary with involved by high grade serous carcinoma, confined to the ovary. Fallopian tube with no change. Lymph nodes and omentum negative for malignancy. Uterus with high grade squamous intraepithelial lesion ALEX-3.   Final pathology pT1c and pN0 (ovarian ruptured at time of surgery).     She presents today with her daughter in law, Donna. Ms. Aragon is healing well without any post op complications. She is a smoker, smokes 1 pack/day X 30+ years, denies alcohol or drug use. Has family history of paternal grandfather with brain tumor, half brother with leukemia, and son with testicular cancer. She has three children 2 daughters and 1 son ranging from 26-31.      Treatment History:          Interval History 07/22/2024   Ms. Aragon presents today prior to C2 of adjuvant carbo/taxol.   [facility] genetic testing is negative for BRCA1/BRCA2.   She reports no NVDC with C1, did have significant myalgia with filgrastim injection which subsided after discontinuation.   Lost all hair after C1. Was emotional, currently on sertaline 25 mg QD with good tolerance. Denies constant neuropathy, admits to having intermittent numbness of her fingers.    Interval History 08/12/2024  Ms. Aragon presents today prior to cycle 3 Carboplatin/Taxol. She reports following cycle 2 she struggled with nausea and vomiting for about three days despite taking zofran and compazine. At present, this has resolved and her appetite has improved. She reports that she is hydrating well. She has intermittent constipation which is relieved with stool softeners and Miralax as needed. Neuropathy is stable. She also continues to report significant bony pain with the use of Claritin daily. She is without any other complaints or concerns at this time.     Interval History 09/03/2024   Ms. Aragon presents prior to C4 of carbo/taxol. Presented to [facility] ED due to COVID-19 with admission from 8/25-27. Reports persistent fatigue, denies any upper respiratory symptoms. Was given Rocephin injection by PCP last week due to sore throat. Reports neuropathy, persistently on b/l hands. No previous NVDC. Currently on gabapentin 100 mg QHS.     Interval History 09/24/2024   Ms. Aragon presents prior to C5 of carboplatin/taxol, accompanied by her son. She reports good tolerance to last cycle without NVDC. No worsened neuropathy, currently taking gabapentin 100 mg daily, recommended increasing to 3 times a day. Doing well with the anti depressant. Appetite is stable.     Interval History 01/21/2025   Ms. Aragon presents for follow up. Last CT A/P performed 11/24/24, without evidence of metastatic disease to abdomen/pelvis, it  did show cyst in left adnexal region. Seeing Dr. Dubois 5/1/2025 for surveillance.   Underwent colonoscopy yesterday- 6 mm polyp found in sigmoid colon, sessile. Two sessile polyps in ascending colon, polyps are 2-6 mm, multiple diverticula in left colon.   Reports lower back pain over the last month, reports some weakness when getting up after being seated for long periods. Denies applying anything topically or utilizing any oral analgesics.   Denies any bleeding or any abdominal discomfort.         Objective     Objective     Allergy:   Allergies   Allergen Reactions    Amoxicillin Other (See Comments)     unknown    Penicillins Other (See Comments)     unknown        Current Medications:   Current Outpatient Medications   Medication Sig Dispense Refill    atorvastatin (LIPITOR) 20 MG tablet Take 1 tablet by mouth Daily. 90 tablet 3    gabapentin (NEURONTIN) 100 MG capsule Take 1 capsule by mouth 3 Times a Day. 90 capsule 3    ibuprofen (ADVIL,MOTRIN) 800 MG tablet Take 1 tablet by mouth Every 8 (Eight) Hours As Needed for Mild Pain. 30 tablet 1    lidocaine-prilocaine (EMLA) 2.5-2.5 % cream Apply to port-a-cath site 30 minutes prior to arrival at infusion center. Cover with plastic wrap. 30 g 1    metoprolol succinate XL (TOPROL-XL) 50 MG 24 hr tablet Take 1 tablet by mouth Daily. 90 tablet 3    O2 (OXYGEN) Inhale 2 L/min Every Night.      pyridoxine (VITAMIN B-6) 50 MG tablet tablet Take 1 tablet by mouth Daily. 30 tablet 3    sertraline (Zoloft) 50 MG tablet Take 1 tablet by mouth Daily. 30 tablet 3    lidocaine (LIDODERM) 5 % Place 1 patch on the skin as directed by provider Daily. Remove & Discard patch within 12 hours or as directed by MD 30 patch 3    nicotine (NICODERM CQ) 21 MG/24HR patch Place 1 patch on the skin as directed by provider Daily. 30 each 3     No current facility-administered medications for this visit.       Past Medical History:  Past Medical History:   Diagnosis Date    Abnormal ECG      Asthma 2024    Cancer 2024    Ovarian cancer stage 2    Change in vision     COPD (chronic obstructive pulmonary disease) 2024    On oxygen at night    Multiple gestation     Ovarian cancer 2024    Wears dentures     UPPER AND LOWER       Past Surgical History:  Past Surgical History:   Procedure Laterality Date    APPENDECTOMY N/A 2024    Procedure: APPENDECTOMY;  Surgeon: Shanda Dubois MD;  Location:  [facility];  Service: Gynecology Oncology;  Laterality: N/A;    CYSTOSCOPY Bilateral 2024    Procedure: CYSTOSCOPY TEMPORARY PLACEMENT BILATERAL URETERAL CATHETER;  Surgeon: Shanda Dubois MD;  Location:  [facility];  Service: Gynecology Oncology;  Laterality: Bilateral;    EXPLORATORY LAPAROTOMY, TOTAL ABDOMINAL HYSTERECTOMY SALPINGO OOPHORECTOMY N/A 2024    Procedure: EXPLORATORY LAPAROTOMY, TOTAL ABDOMINAL HYSTERECTOMY BILATERAL SALPINGO OOPHORECTOMY, CANCER STAGING/ OPTIMAL DEBULKLING (R=0);  Surgeon: Shanda Dubois MD;  Location:  [facility];  Service: Gynecology Oncology;  Laterality: N/A;    OMENTECTOMY N/A 2024    Procedure: OMENTECTOMY;  Surgeon: Shanda Dubois MD;  Location:  [facility];  Service: Gynecology Oncology;  Laterality: N/A;    PORTACATH PLACEMENT Right 2024    Procedure: INSERTION OF PORTACATH;  Surgeon: Ykaov Chung MD;  Location:  [facility];  Service: General;  Laterality: Right;    TUBAL ABDOMINAL LIGATION         Social History:  Social History     Socioeconomic History    Marital status: Single   Tobacco Use    Smoking status: Former     Current packs/day: 0.00     Average packs/day: 1 pack/day for 30.0 years (30.0 ttl pk-yrs)     Types: Cigarettes     Start date:      Quit date: 2024     Years since quittin.0     Passive exposure: Current    Smokeless tobacco: Never   Vaping Use    Vaping status: Never Used   Substance and Sexual Activity    Alcohol use: Never    Drug use: Never    Sexual activity: Yes     Partners:  "Male     Birth control/protection: None, Tubal ligation, Hysterectomy     Jie Aragon  reports that she quit smoking about 3 weeks ago. Her smoking use included cigarettes. She started smoking about 30 years ago. She has a 30 pack-year smoking history. She has been exposed to tobacco smoke. She has never used smokeless tobacco. I have educated her on the risk of diseases from using tobacco products such as cancer, COPD, and heart disease.     I advised her to quit and she is not willing to quit.    I spent 3  minutes counseling the patient.         Family History:  Family History   Problem Relation Age of Onset    Deep vein thrombosis Mother     Stroke Father     Hypertension Father     Heart attack Father     Leukemia Brother     Testicular cancer Son     Breast cancer Neg Hx        Review of Systems:  Review of Systems   All other systems reviewed and are negative.      Vital Signs:   BP 90/63   Pulse 90   Temp 98 °F (36.7 °C) (Temporal)   Resp 20   Ht 175.3 cm (69\")   Wt 69.5 kg (153 lb 3.2 oz)   SpO2 90%   BMI 22.62 kg/m²      Physical Exam:  Physical Exam  Vitals reviewed.   Constitutional:       General: She is not in acute distress.     Appearance: Normal appearance. She is not ill-appearing.   HENT:      Head: Normocephalic and atraumatic.      Mouth/Throat:      Mouth: Mucous membranes are moist.      Pharynx: Oropharynx is clear.   Eyes:      Conjunctiva/sclera: Conjunctivae normal.      Pupils: Pupils are equal, round, and reactive to light.   Cardiovascular:      Rate and Rhythm: Normal rate and regular rhythm.      Heart sounds: No murmur heard.  Pulmonary:      Effort: Pulmonary effort is normal. No respiratory distress.      Breath sounds: Normal breath sounds. No wheezing.   Chest:      Comments: +R chest port  Abdominal:      General: Abdomen is flat. Bowel sounds are normal. There is no distension.      Palpations: Abdomen is soft. There is no mass.      Tenderness: There is no " "abdominal tenderness. There is no guarding.   Musculoskeletal:         General: No swelling. Normal range of motion.      Cervical back: Normal range of motion.   Lymphadenopathy:      Cervical: No cervical adenopathy.   Skin:     General: Skin is warm and dry.   Neurological:      General: No focal deficit present.      Mental Status: She is alert and oriented to person, place, and time. Mental status is at baseline.   Psychiatric:         Mood and Affect: Mood normal.         PHQ-9 Score  PHQ-9 Total Score:       Pain Score  Vitals:    01/21/25 1044   BP: 90/63   Pulse: 90   Resp: 20   Temp: 98 °F (36.7 °C)   TempSrc: Temporal   SpO2: 90%   Weight: 69.5 kg (153 lb 3.2 oz)   Height: 175.3 cm (69\")   PainSc:   8   PainLoc: Back               ECOG score: 0           PAINSCOREQUALITYMETRIC:   Vitals:    01/21/25 1044   PainSc:   8   PainLoc: Back                  Lab Review  Lab Results   Component Value Date    WBC 7.63 01/21/2025    HGB 14.3 01/21/2025    HCT 43.8 01/21/2025    MCV 97.8 (H) 01/21/2025    RDW 12.9 01/21/2025     01/21/2025    NEUTRORELPCT 74.8 01/21/2025    LYMPHORELPCT 18.2 (L) 01/21/2025    MONORELPCT 5.4 01/21/2025    EOSRELPCT 0.8 01/21/2025    BASORELPCT 0.4 01/21/2025    NEUTROABS 5.71 01/21/2025    LYMPHSABS 1.39 01/21/2025     Lab Results   Component Value Date     01/21/2025    K 4.5 01/21/2025    CO2 22.6 01/21/2025     01/21/2025    BUN 16 01/21/2025    CREATININE 0.82 01/21/2025    GLUCOSE 112 (H) 01/21/2025    CALCIUM 10.0 01/21/2025    ALKPHOS 137 (H) 01/21/2025    AST 17 01/21/2025    ALT 29 01/21/2025    BILITOT 0.3 01/21/2025    ALBUMIN 4.5 01/21/2025    PROTEINTOT 7.3 01/21/2025    MG 2.0 11/13/2024    PHOS 3.6 08/27/2024         5/15/2024: 66.4 U/mL  7/1/2024: 12.1  8/12/2024: 9.7   9/3/2024: 8.8  10/14/2024: 8.3           Radiology Results  CT Abdomen Pelvis With Contrast (11/24/2024 11:01)     IMPRESSION:     1. Cyst in the left adnexal region.     2. No " evidence of metastatic disease to the abdomen or pelvis       CT Angiogram Chest Pulmonary Embolism (05/26/2024 07:45)   FINDINGS:  Contrast bolus timing is satisfactory to evaluate for pulmonary  embolism.  Pulmonary arteries: normal.  No pulmonary arterial filling defect is  seen.  Heart and pericardium: normal.  Aorta: normal.  Esophagus: normal.  Lungs and airways: Linear bibasilar atelectasis.  Peripheral emphysema  and cysts..  Pleura: normal.  Lymph nodes: normal.  Musculoskeletal and chest wall: Free air in the abdomen is postsurgical  most likely  Other: n/a     IMPRESSION:  NEGATIVE FOR PULMONARY EMBOLISM.  LINEAR BIBASILAR ATELECTASIS.  FREE AIR IN THE ABDOMEN IS LIKELY POSTSURGICAL    No acute cardiopulmonary process.    Scanned Imaging   CT Abdomen/Pelvis with Contrast (05/08/2024)           Pathology:   05/23/2024              Genetic Testing          Assessment / Plan         Assessment and Plan   Jie Aragon is a 49 y.o. 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 PD-L1 <1%   Cancer Staging. Negative BRCA1/BRCA2   FIGO Stage II (cT2, cN0, cM0)  Stage IC (pT1c2, pN0, cM0)  -patient presents with palpation of RLQ abdominal mass, associated with abdominal pain, bloating, and nausea for ~1 months prior to CT A/P. CT A/P with R mass 17 cm and L mass 11 cm c/f ovarian cancer. : 65  -5/2024 Underwent exploratory laparotomy, total abdominal hysterectomy, bilateral salpingo oophorectomy, omentectomy, appendectomy by Dr. Dubois at [facility]. Final pathology with left ovary with high grade serous carcinoma 9.5 cm confined to the ovary with no surface involvement identified, Right ovary with involved by high grade serous carcinoma, confined to the ovary. Fallopian tube with no change. Lymph nodes and omentum negative for malignancy. Uterus with high grade squamous intraepithelial lesion ALEX-3.   Final pathology pT1c and pN0 (ovarian ruptured at time of surgery).   -Completed  adjuvant chemotherapy with carboplatin AUC 5-6 D1 and paclitaxel 175 mg/m2 D1 every 21 days for 6 cycles. 7/22/2024-10/14/2024. Dose reduced C4-6 due to adverse effects.   -Post treatment CT abdomen/pelvis by Dr. Gil 11/2024 without evidence of malignancy, noted L adnexal cyst.     2. Malignancy associated pain  -Currently denies     3. Nutrition   - Recommend high calories/protein diet during the treatment     4. Chemotherapy induced neuropathy (Grade 1)- Improved  - Admits to persistent neuropathy of b/l fingers  - Started on B6 and gabapentin 100 mg QD    5. Depression   - Started patient on sertraline 25 mg QD with good tolerance, increased to 50 mg QD     6. Nicotine use   - Recommend smoking cessation counseling, patient declined  -Requests nicotine patches, prescription provided    7. Surveillance (per [facility])  -  Visits every 2-4 months for 2 years, then 3-6 months for 3 years, then annually after 5 years  - Physical exam and pelvic exam as clinically indicated   - CT chest/abdomen/pelvis as clinically indicated   -Pt to see Dr. Thompson in three months, will alternative with myself in 6 months      Discussed possible differential diagnoses, testing, treatment, recommended non-pharmacological interventions, risks, warning signs to monitor for that would indicate need for follow-up in clinic or ER. If no improvement with these regimens or you have new or worsening symptoms follow-up. Patient verbalizes understanding and agreement with plan of care. Denies further needs or concerns.     Patient was given instructions and counseling regarding her condition and for health maintenance advised.       All questions were answered to her satisfaction.    BMI is within normal parameters. No other follow-up for BMI required.         ACO / ALEX/Other  Quality measures  -  Jie Aragon did not receive 2024 flu vaccine.  This was recommended.  -  Jie Aragon reports a pain score of 8.  Given her pain  assessment as noted, treatment options were discussed and the following options were decided upon as a follow-up plan to address the patient's pain: continuation of current treatment plan for pain.  -  Current outpatient and discharge medications have been reconciled for the patient.  Reviewed by: Ros Vasquez MD        Meds ordered during this visit  New Medications Ordered This Visit   Medications    gabapentin (NEURONTIN) 100 MG capsule     Sig: Take 1 capsule by mouth 3 Times a Day.     Dispense:  90 capsule     Refill:  3    nicotine (NICODERM CQ) 21 MG/24HR patch     Sig: Place 1 patch on the skin as directed by provider Daily.     Dispense:  30 each     Refill:  3    lidocaine (LIDODERM) 5 %     Sig: Place 1 patch on the skin as directed by provider Daily. Remove & Discard patch within 12 hours or as directed by MD     Dispense:  30 patch     Refill:  3       Visit Diagnoses    ICD-10-CM ICD-9-CM   1. Malignant neoplasm of both ovaries  C56.3 183.0   2. Neuropathy  G62.9 355.9               Follow Up   In 6 months for H&P  Repeat CBC, CMP,          This document has been electronically signed by Ros Vasquez MD   January 21, 2025 12:25 EST    Dictated Utilizing Dragon Dictation: Part of this note may be an electronic transcription/translation of spoken language to printed text using the Dragon Dictation System.

## 2025-01-22 DIAGNOSIS — C56.3 MALIGNANT NEOPLASM OF BOTH OVARIES: Primary | ICD-10-CM

## 2025-01-28 ENCOUNTER — HOSPITAL ENCOUNTER (OUTPATIENT)
Dept: RESPIRATORY THERAPY | Facility: HOSPITAL | Age: 50
Discharge: HOME | End: 2025-01-28
Admitting: INTERNAL MEDICINE
Payer: COMMERCIAL

## 2025-01-28 VITALS — OXYGEN SATURATION: 99 % | RESPIRATION RATE: 18 BRPM | HEART RATE: 83 BPM

## 2025-01-28 DIAGNOSIS — R06.02 SOB (SHORTNESS OF BREATH): ICD-10-CM

## 2025-01-28 PROCEDURE — 94799 UNLISTED PULMONARY SVC/PX: CPT

## 2025-01-28 PROCEDURE — 94726 PLETHYSMOGRAPHY LUNG VOLUMES: CPT

## 2025-01-28 PROCEDURE — 94729 DIFFUSING CAPACITY: CPT

## 2025-01-28 PROCEDURE — 94760 N-INVAS EAR/PLS OXIMETRY 1: CPT

## 2025-01-28 PROCEDURE — 94060 EVALUATION OF WHEEZING: CPT

## 2025-01-28 RX ORDER — ALBUTEROL SULFATE 0.83 MG/ML
2.5 SOLUTION RESPIRATORY (INHALATION) EVERY 4 HOURS PRN
Status: DISCONTINUED | OUTPATIENT
Start: 2025-01-28 | End: 2025-01-29 | Stop reason: HOSPADM

## 2025-01-28 RX ADMIN — ALBUTEROL SULFATE 2.5 MG: 2.5 SOLUTION RESPIRATORY (INHALATION) at 10:14

## 2025-01-30 ENCOUNTER — TELEPHONE (OUTPATIENT)
Dept: PULMONOLOGY | Facility: CLINIC | Age: 50
End: 2025-01-30
Payer: COMMERCIAL

## 2025-01-30 DIAGNOSIS — R06.02 SOB (SHORTNESS OF BREATH): Primary | ICD-10-CM

## 2025-01-30 NOTE — TELEPHONE ENCOUNTER
Pt called to get an order for a POC. After reviewing her note, it is indicated to use O2 nocturnal. I told her she would need a walk test with saturation at least 88% or lower to qualify per insurance. Pt will come by to do a walk test if she is down this way. Provider notified.

## 2025-02-12 ENCOUNTER — OFFICE VISIT (OUTPATIENT)
Dept: PULMONOLOGY | Facility: CLINIC | Age: 50
End: 2025-02-12
Payer: COMMERCIAL

## 2025-02-12 VITALS
BODY MASS INDEX: 22.72 KG/M2 | HEART RATE: 110 BPM | SYSTOLIC BLOOD PRESSURE: 102 MMHG | HEIGHT: 69 IN | WEIGHT: 153.4 LBS | DIASTOLIC BLOOD PRESSURE: 80 MMHG | TEMPERATURE: 97.7 F | OXYGEN SATURATION: 97 %

## 2025-02-12 DIAGNOSIS — G47.34 NOCTURNAL HYPOXEMIA: ICD-10-CM

## 2025-02-12 DIAGNOSIS — J44.9 CHRONIC OBSTRUCTIVE PULMONARY DISEASE, UNSPECIFIED COPD TYPE: Primary | ICD-10-CM

## 2025-02-12 DIAGNOSIS — Z87.891 FORMER SMOKER: ICD-10-CM

## 2025-02-12 RX ORDER — BUDESONIDE, GLYCOPYRROLATE, AND FORMOTEROL FUMARATE 160; 9; 4.8 UG/1; UG/1; UG/1
2 AEROSOL, METERED RESPIRATORY (INHALATION) 2 TIMES DAILY
Qty: 10.7 G | Refills: 5 | Status: SHIPPED | OUTPATIENT
Start: 2025-02-12

## 2025-02-12 RX ORDER — ALBUTEROL SULFATE 90 UG/1
2 INHALANT RESPIRATORY (INHALATION) EVERY 4 HOURS PRN
Qty: 18 G | Refills: 5 | Status: SHIPPED | OUTPATIENT
Start: 2025-02-12

## 2025-02-12 NOTE — PROGRESS NOTES
"Chief Complaint  Shortness of Breath    Subjective          Jie Aragon presents to [facility] for   History of Present Illness    Ms. Aragon is a 49 year old female with a medical history significant for ovarian cancer, and COPD.    She presents today for follow up on shortness of breath.  She reports that she has good days and bad days with her breathing.  She is currently taking Breztri BID.  She is also using albuterol inhaler as needed.  She reports that she does have supplemental oxygen that she is using at night and as needed but that she does not have any portable equipment.  She states that she does have more shortness of breath with exertion. She tells me that she quit smoking about 6 weeks ago.      Objective   Vital Signs:   /80   Pulse 110   Temp 97.7 °F (36.5 °C)   Ht 175.3 cm (69\")   Wt 69.6 kg (153 lb 6.4 oz)   SpO2 97%   BMI 22.65 kg/m²         Physical Exam    GENERAL APPEARANCE: Well developed, well nourished, alert and cooperative, and appears to be in no acute distress.    HEAD: normocephalic. Atraumatic.    EYES: PERRL, EOMI. Vision is grossly intact.    THROAT: Oral cavity and pharynx normal. No inflammation, swelling, exudate, or lesions.     NECK: Neck supple.  No thyromegaly.    CARDIAC: Normal S1 and S2. No S3, S4 or murmurs. Rhythm is regular.     RESPIRATORY:Bilateral air entry positive. Bilateral diminished breath sounds. No wheezing, crackles or rhonchi noted.    GI: Positive bowel sounds. Soft, nondistended, nontender.     MUSCULOSKELETAL: No significant deformity or joint abnormality. No edema. Peripheral pulses intact. No varicosities.    NEUROLOGICAL: Strength and sensation symmetric and intact throughout.     PSYCHIATRIC: The mental examination revealed the patient was oriented to person, place, and time.       Estimated body mass index is 22.65 kg/m² as calculated from the following:    Height as of " "this encounter: 175.3 cm (69\").    Weight as of this encounter: 69.6 kg (153 lb 6.4 oz).        Result Review :   The following data was reviewed by: ESTHER Sequeira on 02/12/2025:  Common labs          11/13/2024    10:42 1/7/2025    13:19 1/21/2025    10:26   Common Labs   Glucose 94  111  112    BUN 17  14  16    Creatinine 0.74  0.70  0.82    Sodium 140  138  137    Potassium 4.0  4.0  4.5    Chloride 103  103  103    Calcium 9.3  9.4  10.0    Albumin 4.1  4.1  4.5    Total Bilirubin 0.3  <0.2  0.3    Alkaline Phosphatase 100  106  137    AST (SGOT) 16  21  17    ALT (SGPT) 18  30  29    WBC 11.17   7.63    Hemoglobin 12.7   14.3    Hematocrit 37.6   43.8    Platelets 291   320    Total Cholesterol  277     Triglycerides  204     HDL Cholesterol  46     LDL Cholesterol   192            PFT:1/28/25      Severe obstructive lung disease with no significant bronchodilator effect seen on this occasion.  Diffusion capacity is normal.  Lung volumes show severe restrictive lung disease.  Flow-volume loop is obstructed and restricted.          Low dose lung cancer screening:None    Previous chest imaging:    CTA  11/11/24    FINDINGS:    Pulmonary arteries:  Unremarkable as visualized.  No pulmonary  embolism.    Aorta:  No acute findings.  No thoracic aortic aneurysm.    Lungs and pleural spaces:  Paraseptal emphysema noted.  No mass.  No  consolidation.  No significant effusion.  No pneumothorax.    Heart:  Unremarkable as visualized.  No cardiomegaly.  No significant  pericardial effusion.  No evidence of RV dysfunction.    Bones/joints:  No acute fracture.  No dislocation.    Soft tissues:  Unremarkable as visualized.    Lymph nodes:  Unremarkable as visualized.  No enlarged lymph nodes.     IMPRESSION:    Paraseptal emphysema noted.        This report was finalized on 11/11/2024 1:36 PM by Dr. Sherif Bridges MD.    Alpha-1 antitrypsin screening:MM    STOP-Bang Score:   NA  P[place] Sleepiness Scale:   " "NA      ABG:    pH No results found for: \"PHART\"   pO2 No results found for: \"PO2ART\"   pCO2 No results found for: \"WDC1YNU\"   HCO3 No results found for: \"QBF4UZG\"                      Assessment and Plan    Problem List Items Addressed This Visit          Sleep    Nocturnal hypoxemia     Other Visit Diagnoses       Chronic obstructive pulmonary disease, unspecified COPD type    -  Primary    Relevant Medications    Budeson-Glycopyrrol-Formoterol (Breztri Aerosphere) 160-9-4.8 MCG/ACT aerosol inhaler    albuterol sulfate  (90 Base) MCG/ACT inhaler    Other Relevant Orders    Oxygen Therapy    Former smoker                Jie Aragon  reports that she quit smoking about 6 weeks ago. Her smoking use included cigarettes. She started smoking about 30 years ago. She has a 30 pack-year smoking history. She has been exposed to tobacco smoke. She has never used smokeless tobacco.     She reports that she is using nicotine patches and quit smoking 6 weeks ago.     PFT is notable for severe obstruction with no significant bronchodilator response.   Continue albuterol inhaler as needed.  Continue Breztri BID.  Sent in refills.    She is compliant with use of supplemental oxygen at night and as needed. She reports that she does not have portable equipment.  She reports more shortness of breath with exertion.    6 MWT was comleted in office. She was noted to have oxygen desaturation to 88% after walking for 5 minutes. Placed order for portable oxygen equipment.    She will need a low dose CT Chest for lung cancer screening in November 2025.      Follow Up   Return in about 6 months (around 8/12/2025).  Patient was given instructions and counseling regarding her condition or for health maintenance advice. Please see specific information pulled into the AVS if appropriate.        "

## 2025-02-18 ENCOUNTER — INFUSION (OUTPATIENT)
Dept: ONCOLOGY | Facility: HOSPITAL | Age: 50
End: 2025-02-18
Payer: COMMERCIAL

## 2025-02-18 VITALS
RESPIRATION RATE: 16 BRPM | TEMPERATURE: 97.9 F | BODY MASS INDEX: 22.54 KG/M2 | HEART RATE: 89 BPM | WEIGHT: 152.6 LBS | OXYGEN SATURATION: 99 % | SYSTOLIC BLOOD PRESSURE: 111 MMHG | DIASTOLIC BLOOD PRESSURE: 74 MMHG

## 2025-02-18 DIAGNOSIS — Z95.828 PORT-A-CATH IN PLACE: Primary | ICD-10-CM

## 2025-02-18 PROCEDURE — 25010000002 HEPARIN LOCK FLUSH PER 10 UNITS

## 2025-02-18 PROCEDURE — 96523 IRRIG DRUG DELIVERY DEVICE: CPT

## 2025-02-18 RX ORDER — SODIUM CHLORIDE 0.9 % (FLUSH) 0.9 %
10 SYRINGE (ML) INJECTION AS NEEDED
OUTPATIENT
Start: 2025-02-18

## 2025-02-18 RX ORDER — HEPARIN SODIUM (PORCINE) LOCK FLUSH IV SOLN 100 UNIT/ML 100 UNIT/ML
500 SOLUTION INTRAVENOUS AS NEEDED
Status: DISCONTINUED | OUTPATIENT
Start: 2025-02-18 | End: 2025-02-18 | Stop reason: HOSPADM

## 2025-02-18 RX ORDER — SODIUM CHLORIDE 0.9 % (FLUSH) 0.9 %
10 SYRINGE (ML) INJECTION AS NEEDED
Status: DISCONTINUED | OUTPATIENT
Start: 2025-02-18 | End: 2025-02-18 | Stop reason: HOSPADM

## 2025-02-18 RX ORDER — HEPARIN SODIUM (PORCINE) LOCK FLUSH IV SOLN 100 UNIT/ML 100 UNIT/ML
500 SOLUTION INTRAVENOUS AS NEEDED
OUTPATIENT
Start: 2025-02-18

## 2025-02-18 RX ORDER — IBUPROFEN 800 MG/1
800 TABLET, FILM COATED ORAL EVERY 8 HOURS PRN
Qty: 30 TABLET | Refills: 1 | Status: SHIPPED | OUTPATIENT
Start: 2025-02-18

## 2025-02-18 RX ADMIN — HEPARIN 500 UNITS: 100 SYRINGE at 11:00

## 2025-02-18 RX ADMIN — Medication 10 ML: at 11:00

## 2025-03-13 ENCOUNTER — TELEPHONE (OUTPATIENT)
Dept: GYNECOLOGIC ONCOLOGY | Facility: CLINIC | Age: 50
End: 2025-03-13
Payer: COMMERCIAL

## 2025-03-13 NOTE — TELEPHONE ENCOUNTER
Spoke with patient. Advised that there are no CT's ordered. I will reach out to provider to see if they are indicated and let her know. She does state that she has been having intermittent L pelvic pain.

## 2025-03-13 NOTE — TELEPHONE ENCOUNTER
Caller: Jie Aragon    Relationship: Self    Best call back number: [phone]      What was the call regarding: PATIENT WANTED TO KNOW IF WE CAN GO AHEAD AND SCHEDULE HER CT SCAN

## 2025-04-01 ENCOUNTER — INFUSION (OUTPATIENT)
Dept: ONCOLOGY | Facility: HOSPITAL | Age: 50
End: 2025-04-01
Payer: COMMERCIAL

## 2025-04-01 VITALS
TEMPERATURE: 97.6 F | OXYGEN SATURATION: 97 % | BODY MASS INDEX: 22.86 KG/M2 | SYSTOLIC BLOOD PRESSURE: 151 MMHG | RESPIRATION RATE: 18 BRPM | WEIGHT: 154.8 LBS | HEART RATE: 88 BPM | DIASTOLIC BLOOD PRESSURE: 74 MMHG

## 2025-04-01 DIAGNOSIS — Z95.828 PORT-A-CATH IN PLACE: Primary | ICD-10-CM

## 2025-04-01 DIAGNOSIS — C56.3 MALIGNANT NEOPLASM OF BOTH OVARIES: ICD-10-CM

## 2025-04-01 LAB — CANCER AG125 SERPL QL: 7.1 U/ML (ref 0–38.1)

## 2025-04-01 PROCEDURE — 25010000002 HEPARIN LOCK FLUSH PER 10 UNITS

## 2025-04-01 PROCEDURE — 86304 IMMUNOASSAY TUMOR CA 125: CPT

## 2025-04-01 PROCEDURE — 36591 DRAW BLOOD OFF VENOUS DEVICE: CPT

## 2025-04-01 RX ORDER — SODIUM CHLORIDE 0.9 % (FLUSH) 0.9 %
10 SYRINGE (ML) INJECTION AS NEEDED
OUTPATIENT
Start: 2025-04-01

## 2025-04-01 RX ORDER — HEPARIN SODIUM (PORCINE) LOCK FLUSH IV SOLN 100 UNIT/ML 100 UNIT/ML
500 SOLUTION INTRAVENOUS AS NEEDED
OUTPATIENT
Start: 2025-04-01

## 2025-04-01 RX ORDER — HEPARIN SODIUM (PORCINE) LOCK FLUSH IV SOLN 100 UNIT/ML 100 UNIT/ML
500 SOLUTION INTRAVENOUS AS NEEDED
Status: DISCONTINUED | OUTPATIENT
Start: 2025-04-01 | End: 2025-04-01 | Stop reason: HOSPADM

## 2025-04-01 RX ORDER — SODIUM CHLORIDE 0.9 % (FLUSH) 0.9 %
10 SYRINGE (ML) INJECTION AS NEEDED
Status: DISCONTINUED | OUTPATIENT
Start: 2025-04-01 | End: 2025-04-01 | Stop reason: HOSPADM

## 2025-04-01 RX ADMIN — Medication 10 ML: at 11:20

## 2025-04-01 RX ADMIN — HEPARIN 500 UNITS: 100 SYRINGE at 11:20

## 2025-04-02 ENCOUNTER — OFFICE VISIT (OUTPATIENT)
Dept: CARDIOLOGY | Facility: CLINIC | Age: 50
End: 2025-04-02
Payer: COMMERCIAL

## 2025-04-02 VITALS
BODY MASS INDEX: 22.66 KG/M2 | HEIGHT: 69 IN | DIASTOLIC BLOOD PRESSURE: 74 MMHG | HEART RATE: 99 BPM | WEIGHT: 153 LBS | OXYGEN SATURATION: 99 % | SYSTOLIC BLOOD PRESSURE: 121 MMHG

## 2025-04-02 DIAGNOSIS — R06.09 DOE (DYSPNEA ON EXERTION): Primary | ICD-10-CM

## 2025-04-02 DIAGNOSIS — E78.5 HYPERLIPIDEMIA LDL GOAL <70: ICD-10-CM

## 2025-04-02 DIAGNOSIS — I25.10 ASCVD (ARTERIOSCLEROTIC CARDIOVASCULAR DISEASE): ICD-10-CM

## 2025-04-02 DIAGNOSIS — C56.2 MALIGNANT NEOPLASM OF LEFT OVARY: ICD-10-CM

## 2025-04-02 DIAGNOSIS — Z72.0 TOBACCO ABUSE: ICD-10-CM

## 2025-04-02 DIAGNOSIS — R00.2 PALPITATIONS: ICD-10-CM

## 2025-04-02 RX ORDER — METOPROLOL SUCCINATE 50 MG/1
75 TABLET, EXTENDED RELEASE ORAL DAILY
Qty: 45 TABLET | Refills: 3 | Status: SHIPPED | OUTPATIENT
Start: 2025-04-02

## 2025-04-02 RX ORDER — ASPIRIN 81 MG/1
81 TABLET ORAL DAILY
Qty: 30 TABLET | Refills: 3 | Status: SHIPPED | OUTPATIENT
Start: 2025-04-02

## 2025-04-02 NOTE — PROGRESS NOTES
Adela Gtz, ESTHER  No ref. provider found    Jie Aragon  1975 11/20/2024    Subjective     Jie Aragon is a 49 y.o. female who presents today to [facility] for Follow-up.    History of Present Illness:    49-year-old female with history of stage II ovarian cancer status post bilateral salpingo-oophorectomy and hysterectomy and completion of chemotherapy with carboplatin and paclitaxel, 30-pack-year smoking history comes in today for evaluation of palpitations and to establish care.    Patient reports that back in May 2024 she found a mass in her lower abdomen for which she saw a doctor and was diagnosed with stage II ovarian cancer.  She was referred to an oncologist in [place] and underwent surgical removal of her ovaries and uterus followed by chemotherapy.  She had an episode of COVID-19 infection back in August 2024 for which she was hospitalized.  About 2 weeks ago patient started noticing that her heart is beating fast.  She was recently at her doctor who noticed her heart rate to be up in 140s for which she was sent to emergency room.  Referred to cardiology for the evaluation of the palpitations.  She had a CT pulmonary embolism protocol in the ED which did not show any evidence of PE.  Patient reports that she gets short of breath on minimal exertion.  She was previously getting dizzy and lightheaded while she was getting chemotherapy but denies any more episodes of dizziness or lightheadedness associated with these palpitations.     Denies having any allergies.  Family history is positive for heart attack and a maternal uncle who passed away at age of 61.  She is an active smoker and is currently trying to quit.  Denies drinking alcohol or substance use.    Last visit 1/2/2025  Patient reports that overall she has noticed significant improvement in her symptoms of palpitations on the current dose of metoprolol but she still has some  episodes of palpitations.  She otherwise denies having any exertional chest pain or shortness of breath.  She underwent CT coronary angiogram on 12/7/2024 which showed less than 25% stenosis in the LAD.  Her echocardiogram done on 12/9/2024 showed normal ejection fraction with no significant valve abnormalities.  Patient reports that she returned her Holter monitor couple of weeks ago but I still have not received the results for review.    This visit 4/2/2025:  Patient overall reports feeling well.  Denies having any specific complaints or concerns.     Allergies   Allergen Reactions    Amoxicillin Other (See Comments)     unknown    Penicillins Other (See Comments)     unknown   :    Current Outpatient Medications:     albuterol sulfate  (90 Base) MCG/ACT inhaler, Inhale 2 puffs Every 4 (Four) Hours As Needed for Wheezing., Disp: 18 g, Rfl: 5    atorvastatin (LIPITOR) 20 MG tablet, Take 2 tablets by mouth Daily., Disp: 180 tablet, Rfl: 3    Budeson-Glycopyrrol-Formoterol (Breztri Aerosphere) 160-9-4.8 MCG/ACT aerosol inhaler, Inhale 2 puffs 2 (Two) Times a Day., Disp: 10.7 g, Rfl: 5    gabapentin (NEURONTIN) 100 MG capsule, Take 1 capsule by mouth 3 Times a Day., Disp: 90 capsule, Rfl: 3    lidocaine (LIDODERM) 5 %, Place 1 patch on the skin as directed by provider Daily. Remove & Discard patch within 12 hours or as directed by MD, Disp: 30 patch, Rfl: 3    lidocaine-prilocaine (EMLA) 2.5-2.5 % cream, Apply to port-a-cath site 30 minutes prior to arrival at infusion center. Cover with plastic wrap., Disp: 30 g, Rfl: 1    metoprolol succinate XL (TOPROL-XL) 50 MG 24 hr tablet, Take 1.5 tablets by mouth Daily., Disp: 45 tablet, Rfl: 3    nicotine (NICODERM CQ) 21 MG/24HR patch, Place 1 patch on the skin as directed by provider Daily., Disp: 30 each, Rfl: 3    O2 (OXYGEN), Inhale 2 L/min Every Night., Disp: , Rfl:     pyridoxine (VITAMIN B-6) 50 MG tablet tablet, Take 1 tablet by mouth Daily., Disp: 30 tablet,  Rfl: 3    sertraline (Zoloft) 50 MG tablet, Take 1 tablet by mouth Daily., Disp: 30 tablet, Rfl: 3    aspirin 81 MG EC tablet, Take 1 tablet by mouth Daily., Disp: 30 tablet, Rfl: 3    Past Medical History:   Diagnosis Date    Abnormal ECG     Asthma 11/2024    Cancer 05/02/2024    Ovarian cancer stage 2    Change in vision     COPD (chronic obstructive pulmonary disease) 11/2024    On oxygen at night    Emphysema of lung     Multiple gestation     Ovarian cancer 05/24/2024    Wears dentures     UPPER AND LOWER     Past Surgical History:   Procedure Laterality Date    APPENDECTOMY N/A 5/23/2024    Procedure: APPENDECTOMY;  Surgeon: Shanda Dubois MD;  Location:  [facility];  Service: Gynecology Oncology;  Laterality: N/A;    COLONOSCOPY N/A 1/20/2025    Procedure: COLONOSCOPY FOR SCREENING;  Surgeon: Ronni Smith MD;  Location:  [facility];  Service: Gastroenterology;  Laterality: N/A;    CYSTOSCOPY Bilateral 5/23/2024    Procedure: CYSTOSCOPY TEMPORARY PLACEMENT BILATERAL URETERAL CATHETER;  Surgeon: Shanda Dubois MD;  Location:  [facility];  Service: Gynecology Oncology;  Laterality: Bilateral;    EXPLORATORY LAPAROTOMY, TOTAL ABDOMINAL HYSTERECTOMY SALPINGO OOPHORECTOMY N/A 5/23/2024    Procedure: EXPLORATORY LAPAROTOMY, TOTAL ABDOMINAL HYSTERECTOMY BILATERAL SALPINGO OOPHORECTOMY, CANCER STAGING/ OPTIMAL DEBULKLING (R=0);  Surgeon: Shanda Dubois MD;  Location:  [facility];  Service: Gynecology Oncology;  Laterality: N/A;    OMENTECTOMY N/A 5/23/2024    Procedure: OMENTECTOMY;  Surgeon: Shanda Dubois MD;  Location:  [facility];  Service: Gynecology Oncology;  Laterality: N/A;    PORTACATH PLACEMENT Right 6/24/2024    Procedure: INSERTION OF PORTACATH;  Surgeon: Yakov Chung MD;  Location:  [facility];  Service: General;  Laterality: Right;    TUBAL ABDOMINAL LIGATION       Family History   Problem Relation Age of Onset    Deep vein thrombosis Mother     Stroke Father     Hypertension  "Father     Heart attack Father     Leukemia Brother     Testicular cancer Son     Breast cancer Neg Hx      Social History     Tobacco Use    Smoking status: Former     Current packs/day: 0.00     Average packs/day: 1 pack/day for 30.0 years (30.0 ttl pk-yrs)     Types: Cigarettes     Start date:      Quit date: 2024     Years since quittin.2     Passive exposure: Current    Smokeless tobacco: Never   Vaping Use    Vaping status: Never Used   Substance Use Topics    Alcohol use: Never    Drug use: Never       Objective   Blood pressure 121/74, pulse 99, height 175.3 cm (69\"), weight 69.4 kg (153 lb), SpO2 99%.  Body mass index is 22.59 kg/m².    Constitutional:       Appearance: Not in distress.   Pulmonary:      Breath sounds: Normal breath sounds.   Cardiovascular:      Normal rate. Regular rhythm. Normal S1. Normal S2.       Murmurs: There is no murmur.   Edema:     Peripheral edema absent.   Skin:     General: Skin is warm.   Neurological:      General: No focal deficit present.           DATA:   Results for orders placed during the hospital encounter of 24    Adult Transthoracic Echo Complete w/ Color, Spectral and Contrast if necessary per protocol    Interpretation Summary    Left ventricular systolic function is normal. Calculated left ventricular EF = 56.6% Left ventricular ejection fraction appears to be 56 - 60%.    Left ventricular diastolic function was normal.    Normal right ventricular systolic function noted.    No hemodynamically significant valvular abnormalities noted.    There is no evidence of pericardial effusion.          No results found for: \"BNP\"  No results found for: \"PSA\"   Lab Results   Component Value Date    MG 2.0 2024     No results found for: \"INR\"  Lab Results   Component Value Date    CKTOTAL 35 2024     Lab Results   Component Value Date    CHOL 277 (H) 2025    CHOL 236 (H) 2024     Lab Results   Component Value Date    TRIG 204 (H) " "01/07/2025    TRIG 73 08/26/2024     Lab Results   Component Value Date    HDL 46 01/07/2025    HDL 40 08/26/2024     Lab Results   Component Value Date     (H) 01/07/2025     (H) 08/26/2024     No components found for: \"A1C\"      Lab Results   Component Value Date    TSH 2.860 11/11/2024             Invalid input(s): \"LABALBU\", \"PROT\"            Results review: During today's encounter, all relevant clinical data has been reviewed.      Procedures    Assessment & Plan    Diagnosis Plan   1. PICKRAD (dyspnea on exertion)        2. Palpitations  metoprolol succinate XL (TOPROL-XL) 50 MG 24 hr tablet      3. Malignant neoplasm of left ovary        4. Hyperlipidemia LDL goal <70  Lipid Panel      5. Tobacco abuse        6. ASCVD (arteriosclerotic cardiovascular disease)  aspirin 81 MG EC tablet    Lipid Panel              Recommendations  Orders Placed This Encounter   Procedures    Lipid Panel        For the symptoms of palpitations, we will increase the dose of Toprol-XL to 75 mg p.o. once daily  CT coronary angiogram is showing less than 25% stenosis in the LAD.  Will continue medical management with Lipitor 40 mg p.o. once daily.  Goal of LDL is less than 70.  Will check lipid panel before next visit.  Patient reports that she has quit smoking.  Encouraged the patient to continue to not smoke again.        New Medications:   New Medications Ordered This Visit   Medications    aspirin 81 MG EC tablet     Sig: Take 1 tablet by mouth Daily.     Dispense:  30 tablet     Refill:  3    metoprolol succinate XL (TOPROL-XL) 50 MG 24 hr tablet     Sig: Take 1.5 tablets by mouth Daily.     Dispense:  45 tablet     Refill:  3       Discontinued Medications:   Medications Discontinued During This Encounter   Medication Reason    ibuprofen (ADVIL,MOTRIN) 800 MG tablet Patient Reported Not Taking    metoprolol succinate XL (TOPROL-XL) 50 MG 24 hr tablet           Return in about 3 months (around 7/2/2025).      Thank " you for allowing me to participate in the care of Jie Aragon. Feel free to contact me directly with any further questions or concerns.      This document has been electronically signed by Arminda Christian MD   April 3, 2025 14:02 EDT    Dictated Utilizing Dragon Dictation: Part of this note may be an electronic transcription/translation of spoken language to printed text using the Dragon Dictation System.      She has history of cancer

## 2025-05-01 ENCOUNTER — OFFICE VISIT (OUTPATIENT)
Dept: GYNECOLOGIC ONCOLOGY | Facility: CLINIC | Age: 50
End: 2025-05-01
Payer: COMMERCIAL

## 2025-05-01 VITALS
HEIGHT: 69 IN | RESPIRATION RATE: 18 BRPM | HEART RATE: 74 BPM | SYSTOLIC BLOOD PRESSURE: 131 MMHG | OXYGEN SATURATION: 99 % | WEIGHT: 152.6 LBS | DIASTOLIC BLOOD PRESSURE: 77 MMHG | BODY MASS INDEX: 22.6 KG/M2 | TEMPERATURE: 97.2 F

## 2025-05-01 DIAGNOSIS — N95.1 VASOMOTOR SYMPTOMS DUE TO MENOPAUSE: ICD-10-CM

## 2025-05-01 DIAGNOSIS — Z78.0 MENOPAUSE: ICD-10-CM

## 2025-05-01 DIAGNOSIS — N95.2 VAGINAL ATROPHY: ICD-10-CM

## 2025-05-01 DIAGNOSIS — Z95.828 PORT-A-CATH IN PLACE: ICD-10-CM

## 2025-05-01 DIAGNOSIS — Z51.81 MEDICATION MONITORING ENCOUNTER: ICD-10-CM

## 2025-05-01 DIAGNOSIS — C56.2 MALIGNANT NEOPLASM OF LEFT OVARY: Primary | ICD-10-CM

## 2025-05-01 RX ORDER — ESTRADIOL 0.1 MG/G
CREAM VAGINAL
Qty: 42.5 G | Refills: 12 | Status: SHIPPED | OUTPATIENT
Start: 2025-05-01

## 2025-05-01 RX ORDER — LIDOCAINE AND PRILOCAINE 25; 25 MG/G; MG/G
CREAM TOPICAL
Qty: 30 G | Refills: 1 | Status: SHIPPED | OUTPATIENT
Start: 2025-05-01

## 2025-05-01 NOTE — PROGRESS NOTES
GYN ONCOLOGY CANCER SURVIVORSHIP VISIT    Jie Aragon  [id number]  1975    Subjective   Chief Complaint: Survivorship Visit (ovarian cancer)      History of present illness:  Jie Aragon is a 49 y.o. year old female who is here today for her Cancer Survivorship visit, see oncology history below. She is now 7 month out since completing treatment. This was overseen by Dr Vasquez at [facility].  She is accompanied today by her daughter.  Overall, she is doing well but does report intermittent and fairly frequently recurring left lower quadrant and left pelvic pain about every other day.  She also c/o fatigue, vaginal dryness, and hot flashes.  I am very pleased to update that she is no longer smoking and her last cigarette was .    Cancer Screening:  -colonoscopy 2025 (Dr Smith), repeat 3 years  -bilateral screening mmg 2024, birads2 ([facility])  -former smoker, smoked 1PPD x30 yrs, 30 pack years     OBGYN History:  She is a .  She does not use HRT. She has an IUD in place. She had a BTL 20 years ago. She does not have a history of abnormal pap smears. Last pap smear 6-7 years ago.       Cancer History:   Oncology/Hematology History   Ovarian cancer   2024 Cancer Staged    Staging form: Ovary, Fallopian Tube, And Primary Peritoneal Carcinoma, AJCC 8th Edition  - Clinical stage from 2024: FIGO Stage II (cT2, cN0, cM0) - Signed by Shanda Dubois MD on 2024  Staging comments: At the time of laparotomy, there is a massively enlarged right ovary that was adhered to the right pelvis.   Both ovarian masses ruptured at the time of surgery.  Mainly fluid was noted although there was some tumor visualized at the right adnexa.      2024 Cancer Staged    Staging form: Ovary, Fallopian Tube, And Primary Peritoneal Carcinoma, AJCC 8th Edition  - Pathologic stage from 2024: Stage IC (pT1c2, pN0, cM0) - Signed by Shanda Dubois MD on  "6/13/2024 5/24/2024 Initial Diagnosis    Ovarian cancer     6/18/2024 Genetic Testing    34 gene Ambry panel performed  No genetic mutations noted     7/1/2024 - 10/14/2024 Chemotherapy    OP OVARIAN PACLitaxel / CARBOplatin AUC=6 (Q21D)     5/1/2025 Survivorship    Survivorship Care Plan completed and discussed with patient.  Copy of Survivorship Care Plan provided to patient and primary care provider.                       The current medication list and allergy list were reviewed and reconciled.     Past Medical History, Past Surgical History, Social History, Family History have been reviewed and are without significant changes except as mentioned.        Review of Systems  See hpi    Objective   Physical Exam  Vitals and nursing note reviewed.   Constitutional:       General: She is not in acute distress.     Appearance: Normal appearance.   Pulmonary:      Effort: Pulmonary effort is normal. No respiratory distress.   Genitourinary:     Comments: Deferred pelvic exam   Skin:     General: Skin is warm and dry.      Capillary Refill: Capillary refill takes less than 2 seconds.   Neurological:      Mental Status: She is alert and oriented to person, place, and time.      Motor: No weakness.      Gait: Gait normal.   Psychiatric:         Mood and Affect: Mood normal.         Behavior: Behavior normal.         Thought Content: Thought content normal.         Judgment: Judgment normal.       Vital Signs: /77   Pulse 74   Temp 97.2 °F (36.2 °C) (Temporal)   Resp 18   Ht 175.3 cm (69\")   Wt 69.2 kg (152 lb 9.6 oz)   SpO2 99%   BMI 22.54 kg/m²   Vitals:    05/01/25 1302   PainSc: 9    PainLoc: Back  Comment: lower back                                     ECOG score: 3             Result Review     CA-125 at dx 66.4 on 5-    Estrogen receptor shows 3+ positivity in approximately 80% of tumor cells. Progesterone receptor shows 3+ positivity in less than 5% of neoplastic cells.     Last imaging " study was 11/2024.     EXAM: CT ABDOMEN PELVIS W CONTRAST-         TECHNIQUE: Multiple axial CT images were obtained from lung bases  through pubic symphysis WITH administration of IV contrast. Reformatted  images in the coronal and/or sagittal plane(s) were generated from the  axial data set to facilitate diagnostic accuracy and/or surgical  planning.  Oral Contrast: ADMINISTERED     Radiation dose reduction techniques were utilized per ALARA protocol.  Automated exposure control was initiated through either or OpenSilo or  FlockTAG software packages by  protocol.    DOSE:     Clinical information Neoplasm: pelvic, other primary, recurrence,  suspected/known      Comparison none immediately available at this time     FINDINGS:     Lower thorax: Clear. No effusions.     Abdomen:     Liver: Homogeneous. No focal hepatic mass or ductal dilatation.     Gallbladder: No dilation or stone identified.     Pancreas: Unremarkable. No mass or ductal dilatation.     Spleen: Homogeneous. No splenomegaly.     Adrenals: No mass.     Kidneys/ureters: No mass. No obstructive uropathy.  No evidence of  urolithiasis.     GI tract: Non-dilated. No definite wall thickening.. There is no  evidence of appendicitis     MESENTERY: No free fluid, walled off fluid collections, mesenteric  stranding, or enlarged lymph nodes        Vasculature: No evidence of aneurysm.     Abdominal wall: No focal hernia or mass.        Bladder: No focal mass or significant wall thickening     Reproductive: Uterus absent. 3.3 cm cyst in the left adnexal region     Bones: Disc space narrowing at L5-S1     IMPRESSION:     1. Cyst in the left adnexal region.     2. No evidence of metastatic disease to the abdomen or pelvis    -Baseline CMP 1-21-25 showing normal hepatic function labs, alk phos slightly above normal.  ALT= 29  AST= 17    Tumor marker:     Date Value Ref Range Status   04/01/2025 7.1 0.0 - 38.1 U/mL Final   01/21/2025 7.1 0.0 -  38.1 U/mL Final   10/14/2024 8.3 0.0 - 38.1 U/mL Final   09/24/2024 9.3 0.0 - 38.1 U/mL Final       Survivorship Needs Assessment:  Nutrition Services  Health history, treatment course, and weight trends reviewed. Discussed nutrition services offered by [facility] including oncology nutrition, diabetes management, general outpatient nutrition, exercise counseling, and weight management. The patient is not in need of referral to nutrition services at this time.     PT/Rehab  Health history, treatment course, and current status. The patient declines referral to physical therapy or rehabilitation services for pelvic floor PT.     Behavioral Health  A post-treatment depression screening has been completed. PHQ-9 results show 5-9 (Mild Depression). The patient has not previously established mental health care. A referral is recommended at this time. 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    -The patient and I have reviewed her Survivorship Care Plan in detail. We discussed her diagnosis, pathology, histology, all treatments, and ongoing surveillance recommendations. All questions were answered to her satisfaction. She is in agreement with our plan for ongoing surveillance as outlined in her plan. A copy of this document was provided to her at the completion of our visit.  A copy has also been sent to her primary care provider.    -  level was elevated at time of diagnosis (see above).  We will follow and surveillance.  This has been added to her port flush therapy plan quarterly.    -Continue routine port flush monthly, will [facility] at [facility] for [facility].    -will begin vaginal estrogen cream for atrophy symptoms, counseled on importance of liberal amounts of vaginal lubricant during intercourse as  well    -systemic HRT is contraindicated as tumor was ER/OH+ (see above). Dr Hardy started zoloft. This is doing well to control her mood sxs.  Will hold off on making any adjustments to this medication for now, but I did let her know this particular med is associated with high occurrence of hot flashes as a side effect. If veozah is not effective, I next recommend cross tapering onto alternative SSRI/SNRI such as celexa or effexor. If veozah is effective and cost is sustainable to continue-- then in the future we could also consider alternative tx with duloxetine as this is good for both neuropathy, MSK pain, dep/anxiety, and BARBARA if she does develop bladder issues s/p hysterectomy.     -Med counseling on veozah.  LFTs did slightly increase during treatment, but multiple subsequent checks remain in normal range.  Baseline hepatic function panel, as above.  I gave her a 1 week supply of sample medications during visit.  Will need to repeat liver labs including ALT, AST, alkaline phosphatase, total and direct serum bilirubin monthly for the first 3 months then at 6 and 9 months as well as PRN for concerning symptoms suggesting liver injury such as nausea, vomiting, or yellowing of the skin or eyes.  -liver labs: 6-1-25, 7-1-25, 8-1-25, 11-1-25, 2-1-2026.     -neuropathy sxs well controlled on gabapentin/ pyridoxine, continue as prescribed.     -Will plan to re-check fatigue and LLQ pain on return and sooner if symptoms worsen or development of new associated problems. Of note, she is now following with cardiology and pulmonology specialists for chronic ds that may also contribute to fatigue. Lastly, there is a small cyst in the left anterior pelvis that is consistent with a lymphocyst     -reviewed surveillance schedule with pt. She does not mind the commute to [place]. She will f/u in 3m. Per Dr Vasquez's note she will also continue to f/u there q6m. Will touch base about coordinating apts.     Health  Maintenance:  -repeat colonoscopy due 1/2028, [facility]   -repeat bilateral screening mmg due on/after 11-, order on return  -CTA chest completed 11-11-24. If no repeat imaging is planned I will order LDCT of the chest on return as well if agreeable since her prev smoking history meets criteria for lung cancer screenig    Diagnoses and all orders for this visit:    1. Malignant neoplasm of left ovary (Primary)    2. Port-A-Cath in place  -     lidocaine-prilocaine (EMLA) 2.5-2.5 % cream; Apply to port-a-cath site 30 minutes prior to arrival at [facility] center. Cover with plastic wrap.  Dispense: 30 g; Refill: 1    3. Menopause  -     Comprehensive Metabolic Panel    4. Medication monitoring encounter  -     Comprehensive Metabolic Panel  -     Comprehensive Metabolic Panel; Future  -     Comprehensive Metabolic Panel; Future  -     Comprehensive Metabolic Panel; Future    5. Vasomotor symptoms due to menopause  -     Fezolinetant (VEOZAH) 45 MG tablet; Take 1 tablet by mouth Daily.  Dispense: 30 tablet; Refill: 11    6. Vaginal atrophy  -     estradiol (ESTRACE) 0.1 MG/GM vaginal cream; Insert 1g vaginally nightly for two weeks, then 1-3x/ week thereafter.  Dispense: 42.5 g; Refill: 12      Pain assessment was performed today as a part of patient’s care. For patients with pain related to surgery, gynecologic malignancy or cancer treatment, the plan is as noted in the assessment/plan.  For patients with pain not related to these issues, they are to seek any further needed care from a more appropriate provider, such as PCP.       I spent 60 minutes caring for Jie on this date of service. This time includes time spent by me in the following activities: preparing for the visit, reviewing tests, obtaining and/or reviewing a separately obtained history, performing a medically appropriate examination and/or evaluation, counseling and educating the patient/family/caregiver, ordering medications, tests, or  procedures, referring and communicating with other health care professionals, documenting information in the medical record, independently interpreting results and communicating that information with the patient/family/caregiver, and care coordination.       Follow Up   Return to clinic in 3 months for ongoing cancer surveillance.      Electronically signed by ESTHER Yung on 05/01/2025

## 2025-05-13 ENCOUNTER — INFUSION (OUTPATIENT)
Dept: ONCOLOGY | Facility: HOSPITAL | Age: 50
End: 2025-05-13
Payer: COMMERCIAL

## 2025-05-13 VITALS
RESPIRATION RATE: 18 BRPM | SYSTOLIC BLOOD PRESSURE: 109 MMHG | DIASTOLIC BLOOD PRESSURE: 68 MMHG | TEMPERATURE: 97.6 F | BODY MASS INDEX: 22 KG/M2 | WEIGHT: 149 LBS | OXYGEN SATURATION: 98 % | HEART RATE: 85 BPM

## 2025-05-13 DIAGNOSIS — C56.2 MALIGNANT NEOPLASM OF LEFT OVARY: ICD-10-CM

## 2025-05-13 DIAGNOSIS — Z95.828 PORT-A-CATH IN PLACE: Primary | ICD-10-CM

## 2025-05-13 LAB
ALBUMIN SERPL-MCNC: 4.3 G/DL (ref 3.5–5.2)
ALBUMIN/GLOB SERPL: 1.6 G/DL
ALP SERPL-CCNC: 84 U/L (ref 39–117)
ALT SERPL W P-5'-P-CCNC: 15 U/L (ref 1–33)
ANION GAP SERPL CALCULATED.3IONS-SCNC: 9.1 MMOL/L (ref 5–15)
AST SERPL-CCNC: 15 U/L (ref 1–32)
BASOPHILS # BLD AUTO: 0.04 10*3/MM3 (ref 0–0.2)
BASOPHILS NFR BLD AUTO: 0.7 % (ref 0–1.5)
BILIRUB SERPL-MCNC: 0.4 MG/DL (ref 0–1.2)
BUN SERPL-MCNC: 10 MG/DL (ref 6–20)
BUN/CREAT SERPL: 12.2 (ref 7–25)
CALCIUM SPEC-SCNC: 9.8 MG/DL (ref 8.6–10.5)
CHLORIDE SERPL-SCNC: 106 MMOL/L (ref 98–107)
CO2 SERPL-SCNC: 24.9 MMOL/L (ref 22–29)
CREAT SERPL-MCNC: 0.82 MG/DL (ref 0.57–1)
DEPRECATED RDW RBC AUTO: 43.5 FL (ref 37–54)
EGFRCR SERPLBLD CKD-EPI 2021: 87.3 ML/MIN/1.73
EOSINOPHIL # BLD AUTO: 0.06 10*3/MM3 (ref 0–0.4)
EOSINOPHIL NFR BLD AUTO: 1.1 % (ref 0.3–6.2)
ERYTHROCYTE [DISTWIDTH] IN BLOOD BY AUTOMATED COUNT: 13.2 % (ref 12.3–15.4)
GLOBULIN UR ELPH-MCNC: 2.7 GM/DL
GLUCOSE SERPL-MCNC: 100 MG/DL (ref 65–99)
HCT VFR BLD AUTO: 38.2 % (ref 34–46.6)
HGB BLD-MCNC: 13 G/DL (ref 12–15.9)
IMM GRANULOCYTES # BLD AUTO: 0.03 10*3/MM3 (ref 0–0.05)
IMM GRANULOCYTES NFR BLD AUTO: 0.5 % (ref 0–0.5)
LYMPHOCYTES # BLD AUTO: 1.33 10*3/MM3 (ref 0.7–3.1)
LYMPHOCYTES NFR BLD AUTO: 23.5 % (ref 19.6–45.3)
MCH RBC QN AUTO: 30.7 PG (ref 26.6–33)
MCHC RBC AUTO-ENTMCNC: 34 G/DL (ref 31.5–35.7)
MCV RBC AUTO: 90.3 FL (ref 79–97)
MONOCYTES # BLD AUTO: 0.44 10*3/MM3 (ref 0.1–0.9)
MONOCYTES NFR BLD AUTO: 7.8 % (ref 5–12)
NEUTROPHILS NFR BLD AUTO: 3.76 10*3/MM3 (ref 1.7–7)
NEUTROPHILS NFR BLD AUTO: 66.4 % (ref 42.7–76)
NRBC BLD AUTO-RTO: 0 /100 WBC (ref 0–0.2)
PLATELET # BLD AUTO: 254 10*3/MM3 (ref 140–450)
PMV BLD AUTO: 9.2 FL (ref 6–12)
POTASSIUM SERPL-SCNC: 4.1 MMOL/L (ref 3.5–5.2)
PROT SERPL-MCNC: 7 G/DL (ref 6–8.5)
RBC # BLD AUTO: 4.23 10*6/MM3 (ref 3.77–5.28)
SODIUM SERPL-SCNC: 140 MMOL/L (ref 136–145)
WBC NRBC COR # BLD AUTO: 5.66 10*3/MM3 (ref 3.4–10.8)

## 2025-05-13 PROCEDURE — 36591 DRAW BLOOD OFF VENOUS DEVICE: CPT

## 2025-05-13 PROCEDURE — 85025 COMPLETE CBC W/AUTO DIFF WBC: CPT

## 2025-05-13 PROCEDURE — 80053 COMPREHEN METABOLIC PANEL: CPT

## 2025-05-13 PROCEDURE — 25010000002 HEPARIN LOCK FLUSH PER 10 UNITS

## 2025-05-13 RX ORDER — SODIUM CHLORIDE 0.9 % (FLUSH) 0.9 %
10 SYRINGE (ML) INJECTION AS NEEDED
OUTPATIENT
Start: 2025-06-01

## 2025-05-13 RX ORDER — HEPARIN SODIUM (PORCINE) LOCK FLUSH IV SOLN 100 UNIT/ML 100 UNIT/ML
500 SOLUTION INTRAVENOUS AS NEEDED
Status: DISCONTINUED | OUTPATIENT
Start: 2025-05-13 | End: 2025-05-13 | Stop reason: HOSPADM

## 2025-05-13 RX ORDER — SODIUM CHLORIDE 0.9 % (FLUSH) 0.9 %
10 SYRINGE (ML) INJECTION AS NEEDED
Status: DISCONTINUED | OUTPATIENT
Start: 2025-05-13 | End: 2025-05-13 | Stop reason: HOSPADM

## 2025-05-13 RX ORDER — HEPARIN SODIUM (PORCINE) LOCK FLUSH IV SOLN 100 UNIT/ML 100 UNIT/ML
500 SOLUTION INTRAVENOUS AS NEEDED
OUTPATIENT
Start: 2025-06-01

## 2025-05-13 RX ADMIN — HEPARIN 500 UNITS: 100 SYRINGE at 08:45

## 2025-05-13 RX ADMIN — Medication 10 ML: at 08:45

## 2025-05-15 DIAGNOSIS — G62.9 NEUROPATHY: ICD-10-CM

## 2025-05-15 DIAGNOSIS — C56.3 MALIGNANT NEOPLASM OF BOTH OVARIES: ICD-10-CM

## 2025-05-15 RX ORDER — LIDOCAINE 50 MG/G
PATCH TOPICAL
Qty: 30 PATCH | Refills: 4 | Status: SHIPPED | OUTPATIENT
Start: 2025-05-15

## 2025-05-15 RX ORDER — GABAPENTIN 100 MG/1
100 CAPSULE ORAL 3 TIMES DAILY
Qty: 90 CAPSULE | Refills: 4 | Status: SHIPPED | OUTPATIENT
Start: 2025-05-15

## 2025-06-10 RX ORDER — ALBUTEROL SULFATE 90 UG/1
AEROSOL, METERED RESPIRATORY (INHALATION)
Qty: 18 G | Refills: 5 | Status: SHIPPED | OUTPATIENT
Start: 2025-06-10

## 2025-06-25 ENCOUNTER — INFUSION (OUTPATIENT)
Dept: ONCOLOGY | Facility: HOSPITAL | Age: 50
End: 2025-06-25
Payer: COMMERCIAL

## 2025-06-25 VITALS
OXYGEN SATURATION: 97 % | DIASTOLIC BLOOD PRESSURE: 75 MMHG | TEMPERATURE: 97.7 F | HEART RATE: 78 BPM | RESPIRATION RATE: 18 BRPM | SYSTOLIC BLOOD PRESSURE: 105 MMHG

## 2025-06-25 DIAGNOSIS — Z95.828 PORT-A-CATH IN PLACE: Primary | ICD-10-CM

## 2025-06-25 PROCEDURE — 25010000002 HEPARIN LOCK FLUSH PER 10 UNITS

## 2025-06-25 PROCEDURE — 96523 IRRIG DRUG DELIVERY DEVICE: CPT

## 2025-06-25 RX ORDER — HEPARIN SODIUM (PORCINE) LOCK FLUSH IV SOLN 100 UNIT/ML 100 UNIT/ML
500 SOLUTION INTRAVENOUS AS NEEDED
OUTPATIENT
Start: 2025-07-01

## 2025-06-25 RX ORDER — SODIUM CHLORIDE 0.9 % (FLUSH) 0.9 %
10 SYRINGE (ML) INJECTION AS NEEDED
OUTPATIENT
Start: 2025-07-01

## 2025-06-25 RX ORDER — HEPARIN SODIUM (PORCINE) LOCK FLUSH IV SOLN 100 UNIT/ML 100 UNIT/ML
500 SOLUTION INTRAVENOUS AS NEEDED
Status: DISCONTINUED | OUTPATIENT
Start: 2025-06-25 | End: 2025-06-25 | Stop reason: HOSPADM

## 2025-06-25 RX ORDER — SODIUM CHLORIDE 0.9 % (FLUSH) 0.9 %
10 SYRINGE (ML) INJECTION AS NEEDED
Status: DISCONTINUED | OUTPATIENT
Start: 2025-06-25 | End: 2025-06-25 | Stop reason: HOSPADM

## 2025-06-25 RX ADMIN — Medication 10 ML: at 08:43

## 2025-06-25 RX ADMIN — Medication 500 UNITS: at 08:43

## 2025-07-10 ENCOUNTER — TELEPHONE (OUTPATIENT)
Dept: CARDIOLOGY | Facility: CLINIC | Age: 50
End: 2025-07-10

## 2025-07-10 ENCOUNTER — RESULTS FOLLOW-UP (OUTPATIENT)
Dept: CARDIOLOGY | Facility: CLINIC | Age: 50
End: 2025-07-10

## 2025-07-10 ENCOUNTER — LAB (OUTPATIENT)
Dept: LAB | Facility: HOSPITAL | Age: 50
End: 2025-07-10
Payer: COMMERCIAL

## 2025-07-10 ENCOUNTER — OFFICE VISIT (OUTPATIENT)
Dept: CARDIOLOGY | Facility: CLINIC | Age: 50
End: 2025-07-10
Payer: COMMERCIAL

## 2025-07-10 VITALS
DIASTOLIC BLOOD PRESSURE: 74 MMHG | WEIGHT: 153 LBS | HEART RATE: 78 BPM | BODY MASS INDEX: 22.66 KG/M2 | SYSTOLIC BLOOD PRESSURE: 113 MMHG | HEIGHT: 69 IN | OXYGEN SATURATION: 96 %

## 2025-07-10 DIAGNOSIS — E78.5 HYPERLIPIDEMIA LDL GOAL <70: Primary | ICD-10-CM

## 2025-07-10 DIAGNOSIS — R00.2 PALPITATIONS: Primary | ICD-10-CM

## 2025-07-10 DIAGNOSIS — E78.5 HYPERLIPIDEMIA LDL GOAL <70: ICD-10-CM

## 2025-07-10 DIAGNOSIS — I25.10 ASCVD (ARTERIOSCLEROTIC CARDIOVASCULAR DISEASE): ICD-10-CM

## 2025-07-10 LAB
CHOLEST SERPL-MCNC: 280 MG/DL (ref 0–200)
HDLC SERPL-MCNC: 67 MG/DL (ref 40–60)
LDLC SERPL CALC-MCNC: 193 MG/DL (ref 0–100)
LDLC/HDLC SERPL: 2.84 {RATIO}
TRIGL SERPL-MCNC: 112 MG/DL (ref 0–150)
VLDLC SERPL-MCNC: 20 MG/DL (ref 5–40)

## 2025-07-10 PROCEDURE — 36415 COLL VENOUS BLD VENIPUNCTURE: CPT

## 2025-07-10 PROCEDURE — 80061 LIPID PANEL: CPT

## 2025-07-10 RX ORDER — PROPRANOLOL HYDROCHLORIDE 60 MG/1
60 CAPSULE, EXTENDED RELEASE ORAL DAILY
Qty: 30 CAPSULE | Refills: 3 | Status: SHIPPED | OUTPATIENT
Start: 2025-07-10

## 2025-07-10 RX ORDER — ATORVASTATIN CALCIUM 40 MG/1
40 TABLET, FILM COATED ORAL DAILY
Qty: 90 TABLET | Refills: 3 | Status: SHIPPED | OUTPATIENT
Start: 2025-07-10

## 2025-07-10 NOTE — PROGRESS NOTES
Adela Gtz, APRN  No ref. provider found    Jie Aragon  1975  07/10/2025    Subjective     Jie Aragon is a 49 y.o. female who presents today to [facility] CARDIOLOG[facility] for Follow-up (Results ).  History of Present Illness  The patient presents for evaluation of palpitations, chest pain, and hyperlipidemia.    Palpitations have improved, occurring intermittently. Metoprolol dosage was increased to 75 mg during her last visit, taken in the morning.    Last week, she experienced sharp, transient chest pain exacerbated by deep breaths, lasting several hours. Lying down did not alleviate the pain. She noticed a tender spot on her chest.    She has been on atorvastatin 40 mg for over 3 months.      Allergies   Allergen Reactions    Amoxicillin Other (See Comments)     unknown    Penicillins Other (See Comments)     unknown   :    Current Outpatient Medications:     aspirin 81 MG EC tablet, Take 1 tablet by mouth Daily., Disp: 30 tablet, Rfl: 3    Budeson-Glycopyrrol-Formoterol (Breztri Aerosphere) 160-9-4.8 MCG/ACT aerosol inhaler, Inhale 2 puffs 2 (Two) Times a Day., Disp: 10.7 g, Rfl: 5    estradiol (ESTRACE) 0.1 MG/GM vaginal cream, Insert 1g vaginally nightly for two weeks, then 1-3x/ week thereafter., Disp: 42.5 g, Rfl: 12    Fezolinetant (VEOZAH) 45 MG tablet, Take 1 tablet by mouth Daily., Disp: 30 tablet, Rfl: 11    gabapentin (NEURONTIN) 100 MG capsule, TAKE ONE CAPSULE BY MOUTH THREE TIMES A DAY, Disp: 90 capsule, Rfl: 4    lidocaine (LIDODERM) 5 %, APPLY 1 PATCH TO INTACT SKIN - ON FOR 12 HOURS AND OFF FOR 12 HOURS, Disp: 30 patch, Rfl: 4    lidocaine-prilocaine (EMLA) 2.5-2.5 % cream, Apply to port-a-cath site 30 minutes prior to arrival at infusion center. Cover with plastic wrap., Disp: 30 g, Rfl: 1    Nicotine Step 1 21 MG/24HR patch, PLACE 1 PATCH ON THE SKIN EVERY DAY AS DIRECTED BY PROVIDER, Disp: 28 patch, Rfl: 4    O2 (OXYGEN), Inhale 2 L/min  Every Night., Disp: , Rfl:     pyridoxine (VITAMIN B-6) 50 MG tablet tablet, Take 1 tablet by mouth Daily., Disp: 30 tablet, Rfl: 3    sertraline (Zoloft) 50 MG tablet, Take 1 tablet by mouth Daily., Disp: 30 tablet, Rfl: 3    Ventolin  (90 Base) MCG/ACT inhaler, USE 2 PUFFS BY MOUTH EVERY 4 HOURS AS NEEDED FOR WHEEZING, Disp: 18 g, Rfl: 5    atorvastatin (Lipitor) 40 MG tablet, Take 1 tablet by mouth Daily., Disp: 90 tablet, Rfl: 3    Evolocumab (REPATHA) solution prefilled syringe injection, Inject 1 mL under the skin into the appropriate area as directed Every 14 (Fourteen) Days., Disp: 2 mL, Rfl: 3    propranolol LA (INDERAL LA) 60 MG 24 hr capsule, Take 1 capsule by mouth Daily., Disp: 30 capsule, Rfl: 3    Past Medical History:   Diagnosis Date    Abnormal ECG     Asthma 11/2024    Cancer 05/02/2024    Ovarian cancer stage 2    Change in vision     COPD (chronic obstructive pulmonary disease) 11/2024    On oxygen at night    Emphysema of lung     Multiple gestation     Ovarian cancer 05/24/2024    Wears dentures     UPPER AND LOWER     Past Surgical History:   Procedure Laterality Date    APPENDECTOMY N/A 5/23/2024    Procedure: APPENDECTOMY;  Surgeon: Shanda Dubois MD;  Location:  [facility];  Service: Gynecology Oncology;  Laterality: N/A;    COLONOSCOPY N/A 1/20/2025    Procedure: COLONOSCOPY FOR SCREENING;  Surgeon: Ronni Smith MD;  Location:  [facility];  Service: Gastroenterology;  Laterality: N/A;    CYSTOSCOPY Bilateral 5/23/2024    Procedure: CYSTOSCOPY TEMPORARY PLACEMENT BILATERAL URETERAL CATHETER;  Surgeon: Shanda Dubois MD;  Location:  [facility];  Service: Gynecology Oncology;  Laterality: Bilateral;    EXPLORATORY LAPAROTOMY, TOTAL ABDOMINAL HYSTERECTOMY SALPINGO OOPHORECTOMY N/A 5/23/2024    Procedure: EXPLORATORY LAPAROTOMY, TOTAL ABDOMINAL HYSTERECTOMY BILATERAL SALPINGO OOPHORECTOMY, CANCER STAGING/ OPTIMAL DEBULKLING (R=0);  Surgeon: Shanda Dubois MD;   "Location:  [facility];  Service: Gynecology Oncology;  Laterality: N/A;    OMENTECTOMY N/A 2024    Procedure: OMENTECTOMY;  Surgeon: Shanda Dubois MD;  Location:  [facility];  Service: Gynecology Oncology;  Laterality: N/A;    PORTACATH PLACEMENT Right 2024    Procedure: INSERTION OF PORTACATH;  Surgeon: Yakov Chung MD;  Location:  [facility];  Service: General;  Laterality: Right;    TUBAL ABDOMINAL LIGATION       Family History   Problem Relation Age of Onset    Deep vein thrombosis Mother     Stroke Father     Hypertension Father     Heart attack Father     Leukemia Brother     Testicular cancer Son     Breast cancer Neg Hx      Social History     Tobacco Use    Smoking status: Former     Current packs/day: 0.00     Average packs/day: 1 pack/day for 30.0 years (30.0 ttl pk-yrs)     Types: Cigarettes     Start date:      Quit date: 2024     Years since quittin.5     Passive exposure: Current    Smokeless tobacco: Never   Vaping Use    Vaping status: Never Used   Substance Use Topics    Alcohol use: Never    Drug use: Never       Objective   Blood pressure 113/74, pulse 78, height 175.3 cm (69.02\"), weight 69.4 kg (153 lb), SpO2 96%.  Body mass index is 22.58 kg/m².    Constitutional:       Appearance: Not in distress.   Pulmonary:      Effort: Pulmonary effort is normal.      Breath sounds: Normal breath sounds.   Cardiovascular:      Normal rate. Regular rhythm. Normal S1. Normal S2.       Murmurs: There is no murmur.   Edema:     Peripheral edema absent.   Skin:     General: Skin is warm.           DATA:   Results for orders placed during the hospital encounter of 24    Adult Transthoracic Echo Complete w/ Color, Spectral and Contrast if necessary per protocol 2024  3:11 PM    Interpretation Summary    Left ventricular systolic function is normal. Calculated left ventricular EF = 56.6% Left ventricular ejection fraction appears to be 56 - 60%.    Left ventricular " "diastolic function was normal.    Normal right ventricular systolic function noted.    No hemodynamically significant valvular abnormalities noted.    There is no evidence of pericardial effusion.          No results found for: \"BNP\"  No results found for: \"PSA\"   Lab Results   Component Value Date    MG 2.0 11/13/2024     No results found for: \"INR\"  Lab Results   Component Value Date    CKTOTAL 35 08/25/2024     Lab Results   Component Value Date    CHOL 280 (H) 07/10/2025    CHOL 277 (H) 01/07/2025    CHOL 236 (H) 08/26/2024     Lab Results   Component Value Date    TRIG 112 07/10/2025    TRIG 204 (H) 01/07/2025    TRIG 73 08/26/2024     Lab Results   Component Value Date    HDL 67 (H) 07/10/2025    HDL 46 01/07/2025    HDL 40 08/26/2024     Lab Results   Component Value Date     (H) 07/10/2025     (H) 01/07/2025     (H) 08/26/2024     No components found for: \"A1C\"      Lab Results   Component Value Date    TSH 2.860 11/11/2024             Invalid input(s): \"LABALBU\", \"PROT\"        Results review: During today's encounter, all relevant clinical data has been reviewed.      Procedures    Assessment & Plan    Diagnosis Plan   1. Palpitations  propranolol LA (INDERAL LA) 60 MG 24 hr capsule    atorvastatin (Lipitor) 40 MG tablet      2. Hyperlipidemia LDL goal <70  Lipid Panel    atorvastatin (Lipitor) 40 MG tablet      3. ASCVD (arteriosclerotic cardiovascular disease)  atorvastatin (Lipitor) 40 MG tablet        Assessment & Plan  1. Palpitations:  - Discontinue metoprolol.  - Start propranolol 60 mg daily.  - Monitor BP and report dizziness or lightheadedness.  - Educate on not stopping beta-blockers abruptly.  - Prescription for 30-day propranolol sent.    2. Chest pain:  - Suggestive of pleuritic or costochondritis.  - Educate on monitoring symptoms and reporting recurrence.  - Watch for angina symptoms during strenuous activity.  - Discuss cholesterol control to prevent coronary " plaques.    3. Hyperlipidemia:  -  mg/dL (01/2025).  - Continue atorvastatin 40 mg.  - Order lipid panel.  - Continue aspirin and Lipitor.  - Educate on goal of LDL <70 to reduce heart attack or stroke risk.    Follow-up in 3 months.    Recommendations  Orders Placed This Encounter   Procedures    Lipid Panel      edications:   New Medications Ordered This Visit   Medications    propranolol LA (INDERAL LA) 60 MG 24 hr capsule     Sig: Take 1 capsule by mouth Daily.     Dispense:  30 capsule     Refill:  3    atorvastatin (Lipitor) 40 MG tablet     Sig: Take 1 tablet by mouth Daily.     Dispense:  90 tablet     Refill:  3       Discontinued Medications:   Medications Discontinued During This Encounter   Medication Reason    metoprolol succinate XL (TOPROL-XL) 50 MG 24 hr tablet Alternate therapy    atorvastatin (LIPITOR) 20 MG tablet Dose adjustment        Return in about 3 months (around 10/10/2025).    Patient or patient representative verbalized consent for the use of Ambient Listening during the visit with  Arminda Christian MD for chart documentation. 7/10/2025  12:41 EDT      Thank you for allowing me to participate in the care of Jie Aragon. Feel free to contact me directly with any further questions or concerns.        This document has been electronically signed by Arminda Christian MD   July 10, 2025 12:44 EDT    Dictated Utilizing Dragon Dictation: Part of this note may be an electronic transcription/translation of spoken language to printed text using the Dragon Dictation System.

## 2025-07-20 NOTE — PROGRESS NOTES
Subjective     Date: 7/21/2025    Referring Provider  No ref. provider found    Chief Complaint  Malignant neoplasm of both ovaries      Cancer Staging   FIGO Stage II (cT2, cN0, cM0)  Stage IC (pT1c2, pN0, cM0)      Subjective          Jie Aragon is a 49 y.o. female who presents today to [facility]EMAT[facility]Y for follow up.     HPI:   49 y.o. 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     Oncology history:  April 2024: Palpated right lower abdominal mass while applying lotion in tanning salon. This was associated with abdominal pain, bloating, and nausea. Care was delayed because patient did not have insurance at the time. Eventually went to PCP who ordered CT imaging of the abdomen.  May 8 2024: CT imaging R mass 17 x 13 cm and left 11x5 cm with septations, likely ovarian.  65  May 20 2024: Seen by Dr. Dubois for consultation.   May 23 2024: Underwent exploratory laparotomy, total abdominal hysterectomy, bilateral salpingo oophorectomy, omentectomy, appendectomy. Final pathology with left ovary with high grade serous carcinoma 9.5 cm confined to the ovary with no surface involvement identified, Right ovary with involved by high grade serous carcinoma, confined to the ovary. Fallopian tube with no change. Lymph nodes and omentum negative for malignancy. Uterus with high grade squamous intraepithelial lesion ALEX-3.   Final pathology pT1c and pN0 (ovarian ruptured at time of surgery).     She presents today with her daughter in law, Donna. Ms. Aragon is healing well without any post op complications. She is a smoker, smokes 1 pack/day X 30+ years, denies alcohol or drug use. Has family history of paternal grandfather with brain tumor, half brother with leukemia, and son with testicular cancer. She has three children 2 daughters and 1 son ranging from 26-31.      Treatment History:        Oncology/Hematology History   Ovarian cancer   5/23/2024 Cancer Staged    Staging form: Ovary, Fallopian Tube, And Primary Peritoneal Carcinoma, AJCC 8th Edition  - Clinical stage from 5/23/2024: FIGO Stage II (cT2, cN0, cM0) - Signed by Shanda Dubois MD on 6/13/2024  Staging comments: At the time of laparotomy, there is a massively enlarged right ovary that was adhered to the right pelvis.   Both ovarian masses ruptured at the time of surgery.  Mainly fluid was noted although there was some tumor visualized at the right adnexa.      5/23/2024 Cancer Staged    Staging form: Ovary, Fallopian Tube, And Primary Peritoneal Carcinoma, AJCC 8th Edition  - Pathologic stage from 5/23/2024: Stage IC (pT1c2, pN0, cM0) - Signed by Shanda Dubois MD on 6/13/2024 5/24/2024 Initial Diagnosis    Ovarian cancer     6/18/2024 Genetic Testing    34 gene [facility] panel performed  No genetic mutations noted     7/1/2024 - 10/14/2024 Chemotherapy    OP OVARIAN PACLitaxel / CARBOplatin AUC=6 (Q21D)     5/1/2025 Survivorship    Survivorship Care Plan completed and discussed with patient.  Copy of Survivorship Care Plan provided to patient and primary care provider.                             Interval History 07/22/2024   Ms. Aragon presents today prior to C2 of adjuvant carbo/taxol.   [facility] genetic testing is negative for BRCA1/BRCA2.   She reports no NVDC with C1, did have significant myalgia with filgrastim injection which subsided after discontinuation.   Lost all hair after C1. Was emotional, currently on sertaline 25 mg QD with good tolerance. Denies constant neuropathy, admits to having intermittent numbness of her fingers.    Interval History 08/12/2024  Ms. Aragon presents today prior to cycle 3 Carboplatin/Taxol. She reports following cycle 2 she struggled with nausea and vomiting for about three days despite taking zofran and compazine. At present, this has resolved and her appetite  has improved. She reports that she is hydrating well. She has intermittent constipation which is relieved with stool softeners and Miralax as needed. Neuropathy is stable. She also continues to report significant bony pain with the use of Claritin daily. She is without any other complaints or concerns at this time.     Interval History 09/03/2024   Ms. Aragon presents prior to C4 of carbo/taxol. Presented to [facility] ED due to COVID-19 with admission from 8/25-27. Reports persistent fatigue, denies any upper respiratory symptoms. Was given Rocephin injection by PCP last week due to sore throat. Reports neuropathy, persistently on b/l hands. No previous NVDC. Currently on gabapentin 100 mg QHS.     Interval History 09/24/2024   Ms. Aragon presents prior to C5 of carboplatin/taxol, accompanied by her son. She reports good tolerance to last cycle without NVDC. No worsened neuropathy, currently taking gabapentin 100 mg daily, recommended increasing to 3 times a day. Doing well with the anti depressant. Appetite is stable.     Interval History 01/21/2025   Ms. Aragon presents for follow up. Last CT A/P performed 11/24/24, without evidence of metastatic disease to abdomen/pelvis, it did show cyst in left adnexal region. Seeing Dr. Dubois 5/1/2025 for surveillance.   Underwent colonoscopy yesterday- 6 mm polyp found in sigmoid colon, sessile. Two sessile polyps in ascending colon, polyps are 2-6 mm, multiple diverticula in left colon.   Reports lower back pain over the last month, reports some weakness when getting up after being seated for long periods. Denies applying anything topically or utilizing any oral analgesics.   Denies any bleeding or any abdominal discomfort.     Interval History 07/21/2025   Ms. Aragon presents for follow up. She was seen by GynOnc service 5/1. Reports persistent neuropathy, currently taking gabapentin once a day in the AM and B6. Still with fatigue, does not exercise and no changes in  nutrition. Has back pain for which she was provided ibuprofen 800 mg and topical lidocaine which has helped. Has LLQ pain and soreness, denies lifting any heavy objects.         Objective     Objective     Allergy:   Allergies   Allergen Reactions    Amoxicillin Other (See Comments)     unknown    Penicillins Other (See Comments)     unknown        Current Medications:   Current Outpatient Medications   Medication Sig Dispense Refill    aspirin 81 MG EC tablet Take 1 tablet by mouth Daily. 30 tablet 3    atorvastatin (Lipitor) 40 MG tablet Take 1 tablet by mouth Daily. 90 tablet 3    Budeson-Glycopyrrol-Formoterol (Breztri Aerosphere) 160-9-4.8 MCG/ACT aerosol inhaler Inhale 2 puffs 2 (Two) Times a Day. 10.7 g 5    estradiol (ESTRACE) 0.1 MG/GM vaginal cream Insert 1g vaginally nightly for two weeks, then 1-3x/ week thereafter. 42.5 g 12    Evolocumab (REPATHA) solution prefilled syringe injection Inject 1 mL under the skin into the appropriate area as directed Every 14 (Fourteen) Days. 2 mL 3    Fezolinetant (VEOZAH) 45 MG tablet Take 1 tablet by mouth Daily. 30 tablet 11    gabapentin (NEURONTIN) 100 MG capsule Take 1 capsule by mouth 2 (Two) Times a Day. 90 capsule 4    lidocaine (LIDODERM) 5 % Place 1 patch on the skin as directed by provider Daily. Remove & Discard patch within 12 hours or as directed by MD 30 patch 4    lidocaine-prilocaine (EMLA) 2.5-2.5 % cream Apply to port-a-cath site 30 minutes prior to arrival at infusion center. Cover with plastic wrap. 30 g 1    Nicotine Step 1 21 MG/24HR patch PLACE 1 PATCH ON THE SKIN EVERY DAY AS DIRECTED BY PROVIDER 28 patch 4    O2 (OXYGEN) Inhale 2 L/min Every Night.      propranolol LA (INDERAL LA) 60 MG 24 hr capsule Take 1 capsule by mouth Daily. 30 capsule 3    pyridoxine (VITAMIN B-6) 50 MG tablet tablet Take 1 tablet by mouth Daily. 30 tablet 3    sertraline (Zoloft) 50 MG tablet Take 1 tablet by mouth Daily. 30 tablet 3    Ventolin  (90 Base)  MCG/ACT inhaler USE 2 PUFFS BY MOUTH EVERY 4 HOURS AS NEEDED FOR WHEEZING 18 g 5    ibuprofen (ADVIL,MOTRIN) 800 MG tablet Take 1 tablet by mouth Every 8 (Eight) Hours As Needed for Mild Pain. 90 tablet 3     No current facility-administered medications for this visit.     Facility-Administered Medications Ordered in Other Visits   Medication Dose Route Frequency Provider Last Rate Last Admin    heparin injection 500 Units  500 Units Intravenous PRN Ros Vasquez MD        sodium chloride 0.9 % flush 10 mL  10 mL Intravenous PRN Ros Vasquez MD           Past Medical History:  Past Medical History:   Diagnosis Date    Abnormal ECG     Asthma 11/2024    Cancer 05/02/2024    Ovarian cancer stage 2    Change in vision     COPD (chronic obstructive pulmonary disease) 11/2024    On oxygen at night    Emphysema of lung     Multiple gestation     Ovarian cancer 05/24/2024    Wears dentures     UPPER AND LOWER       Past Surgical History:  Past Surgical History:   Procedure Laterality Date    APPENDECTOMY N/A 5/23/2024    Procedure: APPENDECTOMY;  Surgeon: Shanda Dubois MD;  Location:  [facility];  Service: Gynecology Oncology;  Laterality: N/A;    COLONOSCOPY N/A 1/20/2025    Procedure: COLONOSCOPY FOR SCREENING;  Surgeon: Ronni Smith MD;  Location:  [facility];  Service: Gastroenterology;  Laterality: N/A;    CYSTOSCOPY Bilateral 5/23/2024    Procedure: CYSTOSCOPY TEMPORARY PLACEMENT BILATERAL URETERAL CATHETER;  Surgeon: Shanda Dubois MD;  Location:  [facility];  Service: Gynecology Oncology;  Laterality: Bilateral;    EXPLORATORY LAPAROTOMY, TOTAL ABDOMINAL HYSTERECTOMY SALPINGO OOPHORECTOMY N/A 5/23/2024    Procedure: EXPLORATORY LAPAROTOMY, TOTAL ABDOMINAL HYSTERECTOMY BILATERAL SALPINGO OOPHORECTOMY, CANCER STAGING/ OPTIMAL DEBULKLING (R=0);  Surgeon: Shanda Dubois MD;  Location:  [facility];  Service: Gynecology Oncology;  Laterality: N/A;    OMENTECTOMY N/A 5/23/2024    Procedure:  "OMENTECTOMY;  Surgeon: Shanda Dubois MD;  Location:  [facility];  Service: Gynecology Oncology;  Laterality: N/A;    PORTACATH PLACEMENT Right 2024    Procedure: INSERTION OF PORTACATH;  Surgeon: Yakov Chung MD;  Location:  [facility];  Service: General;  Laterality: Right;    TUBAL ABDOMINAL LIGATION         Social History:  Social History     Socioeconomic History    Marital status: Single   Tobacco Use    Smoking status: Former     Current packs/day: 0.00     Average packs/day: 1 pack/day for 30.0 years (30.0 ttl pk-yrs)     Types: Cigarettes     Start date:      Quit date: 2024     Years since quittin.5     Passive exposure: Current    Smokeless tobacco: Never   Vaping Use    Vaping status: Never Used   Substance and Sexual Activity    Alcohol use: Never    Drug use: Never    Sexual activity: Yes     Partners: Male     Birth control/protection: None, Tubal ligation, Hysterectomy     Jie Aragon  reports that she quit smoking about 6 months ago. Her smoking use included cigarettes. She started smoking about 30 years ago. She has a 30 pack-year smoking history. She has been exposed to tobacco smoke. She has never used smokeless tobacco. I have educated her on the risk of diseases from using tobacco products such as cancer, COPD, and heart disease.     I advised her to quit and she is not willing to quit.    I spent 3  minutes counseling the patient.         Family History:  Family History   Problem Relation Age of Onset    Deep vein thrombosis Mother     Stroke Father     Hypertension Father     Heart attack Father     Leukemia Brother     Testicular cancer Son     Breast cancer Neg Hx        Review of Systems:  Review of Systems   All other systems reviewed and are negative.      Vital Signs:   BP 94/66   Pulse 93   Temp 97.3 °F (36.3 °C)   Resp 16   Ht 175.3 cm (69.02\")   Wt 71 kg (156 lb 9.6 oz)   BMI 23.12 kg/m²      Physical Exam:  Physical Exam  Vitals reviewed. " "  Constitutional:       General: She is not in acute distress.     Appearance: Normal appearance. She is not ill-appearing.   HENT:      Head: Normocephalic and atraumatic.      Mouth/Throat:      Mouth: Mucous membranes are moist.      Pharynx: Oropharynx is clear.   Eyes:      Conjunctiva/sclera: Conjunctivae normal.      Pupils: Pupils are equal, round, and reactive to light.   Cardiovascular:      Rate and Rhythm: Normal rate and regular rhythm.      Heart sounds: No murmur heard.  Pulmonary:      Effort: Pulmonary effort is normal. No respiratory distress.      Breath sounds: Normal breath sounds. No wheezing.   Chest:      Comments: +R chest port  Abdominal:      General: Abdomen is flat. Bowel sounds are normal. There is no distension.      Palpations: Abdomen is soft. There is no mass.      Tenderness: There is no abdominal tenderness. There is no guarding.   Musculoskeletal:         General: No swelling. Normal range of motion.      Cervical back: Normal range of motion.   Lymphadenopathy:      Cervical: No cervical adenopathy.   Skin:     General: Skin is warm and dry.   Neurological:      General: No focal deficit present.      Mental Status: She is alert and oriented to person, place, and time. Mental status is at baseline.   Psychiatric:         Mood and Affect: Mood normal.         PHQ-9 Score  PHQ-9 Total Score:       Pain Score  Vitals:    07/21/25 1010   BP: 94/66   Pulse: 93   Resp: 16   Temp: 97.3 °F (36.3 °C)   SpO2: Comment: Unable to obtain due to artificial nails   Weight: 71 kg (156 lb 9.6 oz)   Height: 175.3 cm (69.02\")   PainSc: 0-No pain                             PAINSCOREQUALITYMETRIC:   Vitals:    07/21/25 1010   PainSc: 0-No pain                    Lab Review  Lab Results   Component Value Date    WBC 5.64 07/21/2025    HGB 12.2 07/21/2025    HCT 36.8 07/21/2025    MCV 90.2 07/21/2025    RDW 13.4 07/21/2025     07/21/2025    NEUTRORELPCT 63.3 07/21/2025    LYMPHORELPCT 27.5 " 07/21/2025    MONORELPCT 7.1 07/21/2025    EOSRELPCT 1.2 07/21/2025    BASORELPCT 0.5 07/21/2025    NEUTROABS 3.57 07/21/2025    LYMPHSABS 1.55 07/21/2025     Lab Results   Component Value Date     07/21/2025    K 3.8 07/21/2025    CO2 22.7 07/21/2025     07/21/2025    BUN 13.5 07/21/2025    CREATININE 0.85 07/21/2025    GLUCOSE 115 (H) 07/21/2025    CALCIUM 9.1 07/21/2025    ALKPHOS 115 07/21/2025    AST 22 07/21/2025    ALT 24 07/21/2025    BILITOT 0.3 07/21/2025    ALBUMIN 4.3 07/21/2025    PROTEINTOT 6.5 07/21/2025    MG 2.0 11/13/2024    PHOS 3.6 08/27/2024         5/15/2024: 66.4 U/mL  7/1/2024: 12.1  8/12/2024: 9.7   9/3/2024: 8.8  10/14/2024: 8.3   4/1/2025: 7.1         Radiology Results  CT Abdomen Pelvis With Contrast (11/24/2024 11:01)     IMPRESSION:     1. Cyst in the left adnexal region.     2. No evidence of metastatic disease to the abdomen or pelvis       CT Angiogram Chest Pulmonary Embolism (05/26/2024 07:45)   FINDINGS:  Contrast bolus timing is satisfactory to evaluate for pulmonary  embolism.  Pulmonary arteries: normal.  No pulmonary arterial filling defect is  seen.  Heart and pericardium: normal.  Aorta: normal.  Esophagus: normal.  Lungs and airways: Linear bibasilar atelectasis.  Peripheral emphysema  and cysts..  Pleura: normal.  Lymph nodes: normal.  Musculoskeletal and chest wall: Free air in the abdomen is postsurgical  most likely  Other: n/a     IMPRESSION:  NEGATIVE FOR PULMONARY EMBOLISM.  LINEAR BIBASILAR ATELECTASIS.  FREE AIR IN THE ABDOMEN IS LIKELY POSTSURGICAL    No acute cardiopulmonary process.    Scanned Imaging   CT Abdomen/Pelvis with Contrast (05/08/2024)           Pathology:   05/23/2024              Genetic Testing          Assessment / Plan         Assessment and Plan   Jie Aragon is a 49 y.o. 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 PD-L1 <1%   Cancer Staging. Negative BRCA1/BRCA2   FIGO Stage II (cT2, cN0,  cM0)  Stage IC (pT1c2, pN0, cM0)  -patient presents with palpation of RLQ abdominal mass, associated with abdominal pain, bloating, and nausea for ~1 months prior to CT A/P. CT A/P with R mass 17 cm and L mass 11 cm c/f ovarian cancer. : 65  -5/2024 Underwent exploratory laparotomy, total abdominal hysterectomy, bilateral salpingo oophorectomy, omentectomy, appendectomy by Dr. Dubois at [facility]. Final pathology with left ovary with high grade serous carcinoma 9.5 cm confined to the ovary with no surface involvement identified, Right ovary with involved by high grade serous carcinoma, confined to the ovary. Fallopian tube with no change. Lymph nodes and omentum negative for malignancy. Uterus with high grade squamous intraepithelial lesion ALEX-3.   Final pathology pT1c and pN0 (ovarian ruptured at time of surgery).   -Completed adjuvant chemotherapy with carboplatin AUC 5-6 D1 and paclitaxel 175 mg/m2 D1 every 21 days for 6 cycles. 7/22/2024-10/14/2024. Dose reduced C4-6 due to adverse effects.   -Post treatment CT abdomen/pelvis by Dr. Gil 11/2024 without evidence of malignancy, noted L adnexal cyst.     2. Malignancy associated pain  -Currently denies     3. Nutrition   - Recommend high calories/protein diet during the treatment     4. Chemotherapy induced neuropathy (Grade 1)- Improved  - Admits to persistent neuropathy of b/l fingers  - Started on B6; increase gabapentin 100 mg BID    5. Depression   - Started patient on sertraline 25 mg QD with good tolerance, increased to 50 mg QD     6. Nicotine use   - quit 12/31/2024     7. Surveillance (per [facility])  -  Visits every 2-4 months for 2 years, then 3-6 months for 3 years, then annually after 5 years  - Physical exam and pelvic exam as clinically indicated   - CT chest/abdomen/pelvis as clinically indicated   -Pt to see Dr. Thompson in three months, will alternative with myself in 6 months  -CT A/P with contrast is ordered      Discussed possible  differential diagnoses, testing, treatment, recommended non-pharmacological interventions, risks, warning signs to monitor for that would indicate need for follow-up in clinic or ER. If no improvement with these regimens or you have new or worsening symptoms follow-up. Patient verbalizes understanding and agreement with plan of care. Denies further needs or concerns.     Patient was given instructions and counseling regarding her condition and for health maintenance advised.       All questions were answered to her satisfaction.    BMI is within normal parameters. No other follow-up for BMI required.         ACO / ALEX/Other  Quality measures  -  Jie Aragon did not receive 2024 flu vaccine.  This was recommended.  -  Jie Aragon reports a pain score of 0.  Given her pain assessment as noted, treatment options were discussed and the following options were decided upon as a follow-up plan to address the patient's pain: continuation of current treatment plan for pain.  -  Current outpatient and discharge medications have been reconciled for the patient.  Reviewed by: Ros Vasquez MD        Meds ordered during this visit  New Medications Ordered This Visit   Medications    gabapentin (NEURONTIN) 100 MG capsule     Sig: Take 1 capsule by mouth 2 (Two) Times a Day.     Dispense:  90 capsule     Refill:  4    lidocaine (LIDODERM) 5 %     Sig: Place 1 patch on the skin as directed by provider Daily. Remove & Discard patch within 12 hours or as directed by MD     Dispense:  30 patch     Refill:  4    ibuprofen (ADVIL,MOTRIN) 800 MG tablet     Sig: Take 1 tablet by mouth Every 8 (Eight) Hours As Needed for Mild Pain.     Dispense:  90 tablet     Refill:  3       Visit Diagnoses    ICD-10-CM ICD-9-CM   1. Myalgia  M79.10 729.1   2. Malignant neoplasm of both ovaries  C56.3 183.0   3. Neuropathy  G62.9 355.9                 Follow Up   In 4 months for H&P  Repeat CBC, CMP,    FU CT A/P  Can be  referred for port removal if continues to have LESLIE        This document has been electronically signed by Ros Vasquez MD   July 21, 2025 10:36 EDT    Dictated Utilizing Dragon Dictation: Part of this note may be an electronic transcription/translation of spoken language to printed text using the Dragon Dictation System.

## 2025-07-21 ENCOUNTER — OFFICE VISIT (OUTPATIENT)
Dept: ONCOLOGY | Facility: CLINIC | Age: 50
End: 2025-07-21
Payer: COMMERCIAL

## 2025-07-21 ENCOUNTER — INFUSION (OUTPATIENT)
Dept: ONCOLOGY | Facility: HOSPITAL | Age: 50
End: 2025-07-21
Payer: COMMERCIAL

## 2025-07-21 ENCOUNTER — APPOINTMENT (OUTPATIENT)
Dept: ONCOLOGY | Facility: HOSPITAL | Age: 50
End: 2025-07-21
Payer: COMMERCIAL

## 2025-07-21 VITALS
BODY MASS INDEX: 23.19 KG/M2 | DIASTOLIC BLOOD PRESSURE: 66 MMHG | HEIGHT: 69 IN | HEART RATE: 93 BPM | RESPIRATION RATE: 16 BRPM | SYSTOLIC BLOOD PRESSURE: 94 MMHG | TEMPERATURE: 97.3 F | WEIGHT: 156.6 LBS

## 2025-07-21 VITALS
TEMPERATURE: 97.9 F | OXYGEN SATURATION: 99 % | BODY MASS INDEX: 22.76 KG/M2 | HEART RATE: 96 BPM | RESPIRATION RATE: 18 BRPM | SYSTOLIC BLOOD PRESSURE: 107 MMHG | DIASTOLIC BLOOD PRESSURE: 70 MMHG | WEIGHT: 154.2 LBS

## 2025-07-21 DIAGNOSIS — C56.2 MALIGNANT NEOPLASM OF LEFT OVARY: Primary | ICD-10-CM

## 2025-07-21 DIAGNOSIS — C56.3 MALIGNANT NEOPLASM OF BOTH OVARIES: ICD-10-CM

## 2025-07-21 DIAGNOSIS — G62.9 NEUROPATHY: ICD-10-CM

## 2025-07-21 DIAGNOSIS — M79.10 MYALGIA: Primary | ICD-10-CM

## 2025-07-21 DIAGNOSIS — Z95.828 PORT-A-CATH IN PLACE: ICD-10-CM

## 2025-07-21 LAB
ALBUMIN SERPL-MCNC: 4.3 G/DL (ref 3.5–5.2)
ALBUMIN/GLOB SERPL: 2 G/DL
ALP SERPL-CCNC: 115 U/L (ref 39–117)
ALT SERPL W P-5'-P-CCNC: 24 U/L (ref 1–33)
ANION GAP SERPL CALCULATED.3IONS-SCNC: 12.3 MMOL/L (ref 5–15)
AST SERPL-CCNC: 22 U/L (ref 1–32)
BASOPHILS # BLD AUTO: 0.03 10*3/MM3 (ref 0–0.2)
BASOPHILS NFR BLD AUTO: 0.5 % (ref 0–1.5)
BILIRUB SERPL-MCNC: 0.3 MG/DL (ref 0–1.2)
BUN SERPL-MCNC: 13.5 MG/DL (ref 6–20)
BUN/CREAT SERPL: 15.9 (ref 7–25)
CALCIUM SPEC-SCNC: 9.1 MG/DL (ref 8.6–10.5)
CANCER AG125 SERPL QL: 9.7 U/ML (ref 0–38.1)
CHLORIDE SERPL-SCNC: 105 MMOL/L (ref 98–107)
CO2 SERPL-SCNC: 22.7 MMOL/L (ref 22–29)
CREAT SERPL-MCNC: 0.85 MG/DL (ref 0.57–1)
DEPRECATED RDW RBC AUTO: 44.6 FL (ref 37–54)
EGFRCR SERPLBLD CKD-EPI 2021: 83.6 ML/MIN/1.73
EOSINOPHIL # BLD AUTO: 0.07 10*3/MM3 (ref 0–0.4)
EOSINOPHIL NFR BLD AUTO: 1.2 % (ref 0.3–6.2)
ERYTHROCYTE [DISTWIDTH] IN BLOOD BY AUTOMATED COUNT: 13.4 % (ref 12.3–15.4)
GLOBULIN UR ELPH-MCNC: 2.2 GM/DL
GLUCOSE SERPL-MCNC: 115 MG/DL (ref 65–99)
HCT VFR BLD AUTO: 36.8 % (ref 34–46.6)
HGB BLD-MCNC: 12.2 G/DL (ref 12–15.9)
IMM GRANULOCYTES # BLD AUTO: 0.02 10*3/MM3 (ref 0–0.05)
IMM GRANULOCYTES NFR BLD AUTO: 0.4 % (ref 0–0.5)
LYMPHOCYTES # BLD AUTO: 1.55 10*3/MM3 (ref 0.7–3.1)
LYMPHOCYTES NFR BLD AUTO: 27.5 % (ref 19.6–45.3)
MCH RBC QN AUTO: 29.9 PG (ref 26.6–33)
MCHC RBC AUTO-ENTMCNC: 33.2 G/DL (ref 31.5–35.7)
MCV RBC AUTO: 90.2 FL (ref 79–97)
MONOCYTES # BLD AUTO: 0.4 10*3/MM3 (ref 0.1–0.9)
MONOCYTES NFR BLD AUTO: 7.1 % (ref 5–12)
NEUTROPHILS NFR BLD AUTO: 3.57 10*3/MM3 (ref 1.7–7)
NEUTROPHILS NFR BLD AUTO: 63.3 % (ref 42.7–76)
NRBC BLD AUTO-RTO: 0 /100 WBC (ref 0–0.2)
PLATELET # BLD AUTO: 272 10*3/MM3 (ref 140–450)
PMV BLD AUTO: 9 FL (ref 6–12)
POTASSIUM SERPL-SCNC: 3.8 MMOL/L (ref 3.5–5.2)
PROT SERPL-MCNC: 6.5 G/DL (ref 6–8.5)
RBC # BLD AUTO: 4.08 10*6/MM3 (ref 3.77–5.28)
SODIUM SERPL-SCNC: 140 MMOL/L (ref 136–145)
WBC NRBC COR # BLD AUTO: 5.64 10*3/MM3 (ref 3.4–10.8)

## 2025-07-21 PROCEDURE — 1126F AMNT PAIN NOTED NONE PRSNT: CPT | Performed by: INTERNAL MEDICINE

## 2025-07-21 PROCEDURE — 85025 COMPLETE CBC W/AUTO DIFF WBC: CPT

## 2025-07-21 PROCEDURE — 25010000002 HEPARIN LOCK FLUSH PER 10 UNITS: Performed by: INTERNAL MEDICINE

## 2025-07-21 PROCEDURE — 36591 DRAW BLOOD OFF VENOUS DEVICE: CPT

## 2025-07-21 PROCEDURE — 80053 COMPREHEN METABOLIC PANEL: CPT

## 2025-07-21 PROCEDURE — 99214 OFFICE O/P EST MOD 30 MIN: CPT | Performed by: INTERNAL MEDICINE

## 2025-07-21 PROCEDURE — 86304 IMMUNOASSAY TUMOR CA 125: CPT

## 2025-07-21 RX ORDER — SODIUM CHLORIDE 0.9 % (FLUSH) 0.9 %
10 SYRINGE (ML) INJECTION AS NEEDED
OUTPATIENT
Start: 2025-08-01

## 2025-07-21 RX ORDER — HEPARIN SODIUM (PORCINE) LOCK FLUSH IV SOLN 100 UNIT/ML 100 UNIT/ML
500 SOLUTION INTRAVENOUS AS NEEDED
OUTPATIENT
Start: 2025-08-01

## 2025-07-21 RX ORDER — GABAPENTIN 100 MG/1
100 CAPSULE ORAL 2 TIMES DAILY
Qty: 90 CAPSULE | Refills: 4 | Status: SHIPPED | OUTPATIENT
Start: 2025-07-21

## 2025-07-21 RX ORDER — SODIUM CHLORIDE 0.9 % (FLUSH) 0.9 %
10 SYRINGE (ML) INJECTION AS NEEDED
Status: DISCONTINUED | OUTPATIENT
Start: 2025-07-21 | End: 2025-07-21 | Stop reason: HOSPADM

## 2025-07-21 RX ORDER — IBUPROFEN 800 MG/1
800 TABLET, FILM COATED ORAL EVERY 8 HOURS PRN
Qty: 90 TABLET | Refills: 3 | Status: SHIPPED | OUTPATIENT
Start: 2025-07-21

## 2025-07-21 RX ORDER — HEPARIN SODIUM (PORCINE) LOCK FLUSH IV SOLN 100 UNIT/ML 100 UNIT/ML
500 SOLUTION INTRAVENOUS AS NEEDED
Status: DISCONTINUED | OUTPATIENT
Start: 2025-07-21 | End: 2025-07-21 | Stop reason: HOSPADM

## 2025-07-21 RX ORDER — LIDOCAINE 50 MG/G
1 PATCH TOPICAL EVERY 24 HOURS
Qty: 30 PATCH | Refills: 4 | Status: SHIPPED | OUTPATIENT
Start: 2025-07-21

## 2025-07-21 RX ADMIN — Medication 10 ML: at 09:55

## 2025-07-21 RX ADMIN — HEPARIN 500 UNITS: 100 SYRINGE at 09:55

## 2025-07-24 ENCOUNTER — SPECIALTY PHARMACY (OUTPATIENT)
Dept: PHARMACY | Facility: HOSPITAL | Age: 50
End: 2025-07-24
Payer: COMMERCIAL

## 2025-07-24 DIAGNOSIS — E78.5 HYPERLIPIDEMIA, UNSPECIFIED HYPERLIPIDEMIA TYPE: Primary | ICD-10-CM

## 2025-07-24 NOTE — PROGRESS NOTES
[facility]ication [facility]ty Pharmacy Patient Management Program  Lipid Management Program - PCSK9i Initial Assessment     Jie Aragon is a 49 y.o. female referred by their provider, Dr. Christian, to the Hyperlipidemia Patient Management program offered by [facility] for Lipid Management.  An initial outreach was conducted, including assessment of therapy appropriateness and specialty medication education for Repatha. The patient was introduced to services offered by [facility], including: regular assessments, refill coordination, curbside pick-up or mail order delivery options, prior authorization maintenance, and financial assistance programs as applicable. The patient was also provided with contact information for the pharmacy team.     Jie Aragon is  treated for hyperlipidemia and currently takes atorvastatin 40 mg.  In the past, Pt has tried atorvastatin. The patient denies any allergies to latex.        Initial Start Date of PCSK9i: 7/24/25  Initial LDL: 193 mg/dl    Insurance Coverage & Financial Support  medicaid     Relevant Past Medical History and Comorbidities  Relevant medical history and concomitant health conditions were discussed with the patient. The patient's chart has been reviewed for relevant past medical history and comorbid conditions and updated as necessary.  Past Medical History:   Diagnosis Date    Abnormal ECG     Asthma 11/2024    Cancer 05/02/2024    Ovarian cancer stage 2    Change in vision     COPD (chronic obstructive pulmonary disease) 11/2024    On oxygen at night    Emphysema of lung     Hyperlipemia 7/24/2025    Per lipid referral    Multiple gestation     Ovarian cancer 05/24/2024    Wears dentures     UPPER AND LOWER     Social History     Socioeconomic History    Marital status: Single   Tobacco Use    Smoking status: Former     Current packs/day: 0.00     Average  packs/day: 1 pack/day for 30.0 years (30.0 ttl pk-yrs)     Types: Cigarettes     Start date:      Quit date: 2024     Years since quittin.5     Passive exposure: Current    Smokeless tobacco: Never   Vaping Use    Vaping status: Never Used   Substance and Sexual Activity    Alcohol use: Never    Drug use: Never    Sexual activity: Yes     Partners: Male     Birth control/protection: None, Tubal ligation, Hysterectomy       Problem list reviewed by Julia Benjamin PharmD on 2025 at  9:28 AM    Allergies  Known allergies and reactions were discussed with the patient. The patient's chart has been reviewed for  allergy information and updated as necessary.   Allergies   Allergen Reactions    Amoxicillin Other (See Comments)     unknown    Penicillins Other (See Comments)     unknown       Allergies reviewed by Julia Benjamin PharmD on 2025 at  9:28 AM    Relevant Laboratory Values  Relevant laboratory values were discussed with the patient. The following specialty medication dose adjustment(s) are recommended: See plan, if applicable   Lab Results   Component Value Date    CHOL 280 (H) 07/10/2025    TRIG 112 07/10/2025    HDL 67 (H) 07/10/2025     (H) 07/10/2025       Current Medication List  This medication list has been reviewed with the patient and evaluated for any interactions or necessary modifications/recommendations, and updated to include all prescription medications, OTC medications, and supplements the patient is currently taking.  This list reflects what is contained in the patient's profile, which has also been marked as reviewed to communicate to other providers it is the most up to date version of the patient's current medication therapy.     Current Outpatient Medications:     aspirin 81 MG EC tablet, Take 1 tablet by mouth Daily., Disp: 30 tablet, Rfl: 3    atorvastatin (Lipitor) 40 MG tablet, Take 1 tablet by mouth Daily., Disp: 90 tablet, Rfl: 3     Budeson-Glycopyrrol-Formoterol (Breztri Aerosphere) 160-9-4.8 MCG/ACT aerosol inhaler, Inhale 2 puffs 2 (Two) Times a Day., Disp: 10.7 g, Rfl: 5    estradiol (ESTRACE) 0.1 MG/GM vaginal cream, Insert 1g vaginally nightly for two weeks, then 1-3x/ week thereafter., Disp: 42.5 g, Rfl: 12    Evolocumab (REPATHA) solution auto-injector SureClick injection, Inject 1 mL under the skin into the appropriate area as directed Every 14 (Fourteen) Days., Disp: 6 mL, Rfl: 1    Fezolinetant (VEOZAH) 45 MG tablet, Take 1 tablet by mouth Daily., Disp: 30 tablet, Rfl: 11    gabapentin (NEURONTIN) 100 MG capsule, Take 1 capsule by mouth 2 (Two) Times a Day., Disp: 90 capsule, Rfl: 4    ibuprofen (ADVIL,MOTRIN) 800 MG tablet, Take 1 tablet by mouth Every 8 (Eight) Hours As Needed for Mild Pain., Disp: 90 tablet, Rfl: 3    lidocaine (LIDODERM) 5 %, Place 1 patch on the skin as directed by provider Daily. Remove & Discard patch within 12 hours or as directed by MD, Disp: 30 patch, Rfl: 4    lidocaine-prilocaine (EMLA) 2.5-2.5 % cream, Apply to port-a-cath site 30 minutes prior to arrival at infusion center. Cover with plastic wrap., Disp: 30 g, Rfl: 1    Nicotine Step 1 21 MG/24HR patch, PLACE 1 PATCH ON THE SKIN EVERY DAY AS DIRECTED BY PROVIDER, Disp: 28 patch, Rfl: 4    O2 (OXYGEN), Inhale 2 L/min Every Night., Disp: , Rfl:     propranolol LA (INDERAL LA) 60 MG 24 hr capsule, Take 1 capsule by mouth Daily., Disp: 30 capsule, Rfl: 3    pyridoxine (VITAMIN B-6) 50 MG tablet tablet, Take 1 tablet by mouth Daily., Disp: 30 tablet, Rfl: 3    sertraline (Zoloft) 50 MG tablet, Take 1 tablet by mouth Daily., Disp: 30 tablet, Rfl: 3    Ventolin  (90 Base) MCG/ACT inhaler, USE 2 PUFFS BY MOUTH EVERY 4 HOURS AS NEEDED FOR WHEEZING, Disp: 18 g, Rfl: 5  No current facility-administered medications for this visit.    Medicines reviewed by Julia Benjamin, PharmD on 7/24/2025 at  9:28 AM    Drug Interactions  No significant drug  interactions with repatha per literature    Adherence and Self-Administration  Adherence related to the patient's specialty therapy was discussed with the patient. The Adherence segment of this outreach has been reviewed and updated.              Methods for Supporting Patient Adherence and/or Self-Administration: see plan, if applicable     Open Medication Therapy Problems  No medication therapy recommendations to display    Goals of Therapy  Goals related to the patient's specialty therapy were discussed with the patient. The Patient Goals segment of this outreach has been reviewed and updated.   Goals Addressed Today        Specialty Pharmacy General Goal      LDL < 70    7/24/25 [place]: starting repatha today.  on 7/11/25              Medication Assessment & Plan  Medication Therapy Changes: Patient started today on repatha sureclick 140 mg SC every 2 weeks.   Injection training and medication education provided.   Denise PolancoD candidate 2026 administered into the back of the patient's LEFT arm  Welcome information and patient satisfaction survey to be sent by specialty pharmacy team with patient's initial fill.  Related Plans, Therapy Recommendations, or Therapy Problems to Be Addressed: monitor LDL  Patient will need lipid panel in 6 weeks, order placed.   Patient will continue regular follow-up with cardiology.   Patient will follow up with specialty pharmacy mail-out services for next injection. Care Coordinator to set up future refill outreaches, coordinate prescription delivery, and escalate clinical questions to pharmacist.  Pharmacist to perform regular assessments no more than (6) months from the previous assessment. Will follow-up in 6 months, or sooner if needed.    Initial Education Provided for Specialty Medication  The patient has been provided with the following education and any applicable administration techniques (i.e. self-injection) have been demonstrated for the therapies  indicated. All questions and concerns have been addressed prior to the patient receiving the medication, and the patient has verbalized comprehension of the education and any materials provided. Additional patient education shall be provided and documented upon request by the patient, provider, or payer.    REPATHA® (evolocumab)  Medication Expectations   Why am I taking this medication? You are taking Repatha to lower your “bad” cholesterol (LDL-C). This medication can be used in adults with high blood cholesterol including primary hyperlipidemia and familial hypercholesterolemia.    What should I expect while on this medication? You should expect to see your cholesterol improve over time. Specifically, you should see your LDL-C decrease.    How does the medication work? Repatha works by blocking a protein called PCSK9 that contributes to high levels of bad cholesterol. It helps increase your liver's ability to remove bad cholesterol from your blood.     How long will I be on this medication for? The amount of time you will be on this medication will be determined by your doctor based on your cholesterol and/or your risk of having a cardiac event. You will most likely be on this medication or another cholesterol medication throughout your lifetime. Do not abruptly stop this medication without talking to your doctor first.    How do I take this medication? Take as directed on your prescription label. Repatha is injected under the skin (subcutaneously) of your stomach, thigh, or upper arm. This medication is usually given one or twice a month.   What are some possible side effects? The most common side effects of Repatha include redness, itching, swelling, or pain/tenderness at the injection site, symptoms of the common cold, flu or flu-like symptoms or back pain.    What happens if I miss a dose? If you miss a dose, take it as soon as you remember if it is within 7 days from the usual day of administration then  resume your original schedule. If it is beyond 7 days and you use the lenore-2-week dose, skip the missed dose and resume your normal dosing schedule.If it is beyond 7 days and you use the once-monthly dose, inject the dose and start a new schedule based on that date.      Medication Safety   What are things I should warn my doctor immediately about? Talk to your doctor if you are pregnant, planning to become pregnant, or breastfeeding. Stop the medication and tell your doctor or seek emergency medical help if you notice any signs/symptoms of an allergic reaction (severe rash, redness, hives, severe itching, trouble breathing, or swelling of the face, lips, or tongue). If you have a rubber or latex allergy, you should not use the Repatha SureClick® Autoinjector pen or the prefilled syringe, please notify your doctor or pharmacist.   What are things that I should be cautious of? Be cautious of any side effects from this medication. Talk to your doctor if any new ones develop or aren't getting better.   What are some medications that can interact with this one? There are no known significant drug interactions with Repatha. Always tell your doctor or pharmacist immediately if you start taking any new medications, including over-the-counter medications, vitamins, and herbal supplements.      Medication Storage/Handling   How should I handle this medication? Do not shake or expose the pens, cartridges, or syringes to extreme heat or direct sunlight. Keep this medication out of reach of pets/children. Allow medication to warm at room temperature prior to administration.   How does this medication need to be stored? Store unused pens, cartridges, or syringes in the refrigerator in the original cartons to protect from light. If needed, Repatha may be kept at room temperature in the original carton for up to 30 days. Do not freeze.    How should I dispose of this medication? All the Repatha devices are single-dose and should  be discarded in a sharps container after use. If your doctor decides to stop this medication, take to your local police station for proper disposal. Some pharmacies also have take-back bins for medication drop-off.      Resources/Support   How can I remind myself to take this medication? You can download reminder apps to help you manage your refills. You may also set an alarm on your phone to remind you to take your dose.    Is financial support available?  [employer] can provide co-pay cards if you have commercial insurance or patient assistance if you have Medicare or no insurance.    Which vaccines are recommended for me? Talk to your doctor about these vaccines: Flu, Coronavirus (COVID-19), Pneumococcal (pneumonia), Tdap, Hepatitis B, Zoster (shingles)         Attestation      I attest the patient was actively involved in and has agreed to the above plan of care. If the prescribed therapy is at any point deemed not appropriate based on the current or future assessments, a consultation will be initiated with the patient's specialty care provider to determine the best course of action. The revised plan of therapy will be documented along with any required assessments and/or additional patient education provided.     Julia Benjamin, PharmD  7/24/2025  09:31 EDT

## 2025-07-24 NOTE — PROGRESS NOTES
[facility] Patient Management Program  Lipid Management Program - PCSK9i Initial Assessment     Jie Aragon is a 49 y.o. female referred by their provider, Dr. Christian, to the Hyperlipidemia Patient Management program offered by [facility] for Lipid Management.  An initial outreach was conducted, including assessment of therapy appropriateness and specialty medication education for Repatha. The patient was introduced to services offered by [facility], including: regular assessments, refill coordination, curbside pick-up or mail order delivery options, prior authorization maintenance, and financial assistance programs as applicable. The patient was also provided with contact information for the pharmacy team.     Jie Aragon is  treated for hyperlipidemia and currently takes atorvastatin 40 mg.  In the past, Pt has tried atorvastatin. The patient reports/denies any allergies to latex.        Initial Start Date of PCSK9i: 7/24/25  Initial LDL: 193 mg/dl    Insurance Coverage & Financial Support  medicaid     Relevant Past Medical History and Comorbidities  Relevant medical history and concomitant health conditions were discussed with the patient. The patient's chart has been reviewed for relevant past medical history and comorbid conditions and updated as necessary.  Past Medical History:   Diagnosis Date    Abnormal ECG     Asthma 11/2024    Cancer 05/02/2024    Ovarian cancer stage 2    Change in vision     COPD (chronic obstructive pulmonary disease) 11/2024    On oxygen at night    Emphysema of lung     Hyperlipemia 7/24/2025    Per lipid referral    Multiple gestation     Ovarian cancer 05/24/2024    Wears dentures     UPPER AND LOWER     Social History     Socioeconomic History    Marital status: Single   Tobacco Use    Smoking status: Former     Current packs/day: 0.00      Average packs/day: 1 pack/day for 30.0 years (30.0 ttl pk-yrs)     Types: Cigarettes     Start date:      Quit date: 2024     Years since quittin.5     Passive exposure: Current    Smokeless tobacco: Never   Vaping Use    Vaping status: Never Used   Substance and Sexual Activity    Alcohol use: Never    Drug use: Never    Sexual activity: Yes     Partners: Male     Birth control/protection: None, Tubal ligation, Hysterectomy            Allergies  Known allergies and reactions were discussed with the patient. The patient's chart has been reviewed for  allergy information and updated as necessary.   Allergies   Allergen Reactions    Amoxicillin Other (See Comments)     unknown    Penicillins Other (See Comments)     unknown            Relevant Laboratory Values  Relevant laboratory values were discussed with the patient. The following specialty medication dose adjustment(s) are recommended: See plan, if applicable   Lab Results   Component Value Date    CHOL 280 (H) 07/10/2025    TRIG 112 07/10/2025    HDL 67 (H) 07/10/2025     (H) 07/10/2025       Current Medication List  This medication list has been reviewed with the patient and evaluated for any interactions or necessary modifications/recommendations, and updated to include all prescription medications, OTC medications, and supplements the patient is currently taking.  This list reflects what is contained in the patient's profile, which has also been marked as reviewed to communicate to other providers it is the most up to date version of the patient's current medication therapy.     Current Outpatient Medications:     aspirin 81 MG EC tablet, Take 1 tablet by mouth Daily., Disp: 30 tablet, Rfl: 3    atorvastatin (Lipitor) 40 MG tablet, Take 1 tablet by mouth Daily., Disp: 90 tablet, Rfl: 3    Budeson-Glycopyrrol-Formoterol (Breztri Aerosphere) 160-9-4.8 MCG/ACT aerosol inhaler, Inhale 2 puffs 2 (Two) Times a Day., Disp: 10.7 g, Rfl: 5     estradiol (ESTRACE) 0.1 MG/GM vaginal cream, Insert 1g vaginally nightly for two weeks, then 1-3x/ week thereafter., Disp: 42.5 g, Rfl: 12    Evolocumab (REPATHA) solution auto-injector SureClick injection, Inject 1 mL under the skin into the appropriate area as directed Every 14 (Fourteen) Days., Disp: 6 mL, Rfl: 2    Fezolinetant (VEOZAH) 45 MG tablet, Take 1 tablet by mouth Daily., Disp: 30 tablet, Rfl: 11    gabapentin (NEURONTIN) 100 MG capsule, Take 1 capsule by mouth 2 (Two) Times a Day., Disp: 90 capsule, Rfl: 4    ibuprofen (ADVIL,MOTRIN) 800 MG tablet, Take 1 tablet by mouth Every 8 (Eight) Hours As Needed for Mild Pain., Disp: 90 tablet, Rfl: 3    lidocaine (LIDODERM) 5 %, Place 1 patch on the skin as directed by provider Daily. Remove & Discard patch within 12 hours or as directed by MD, Disp: 30 patch, Rfl: 4    lidocaine-prilocaine (EMLA) 2.5-2.5 % cream, Apply to port-a-cath site 30 minutes prior to arrival at infusion center. Cover with plastic wrap., Disp: 30 g, Rfl: 1    Nicotine Step 1 21 MG/24HR patch, PLACE 1 PATCH ON THE SKIN EVERY DAY AS DIRECTED BY PROVIDER, Disp: 28 patch, Rfl: 4    O2 (OXYGEN), Inhale 2 L/min Every Night., Disp: , Rfl:     propranolol LA (INDERAL LA) 60 MG 24 hr capsule, Take 1 capsule by mouth Daily., Disp: 30 capsule, Rfl: 3    pyridoxine (VITAMIN B-6) 50 MG tablet tablet, Take 1 tablet by mouth Daily., Disp: 30 tablet, Rfl: 3    sertraline (Zoloft) 50 MG tablet, Take 1 tablet by mouth Daily., Disp: 30 tablet, Rfl: 3    Ventolin  (90 Base) MCG/ACT inhaler, USE 2 PUFFS BY MOUTH EVERY 4 HOURS AS NEEDED FOR WHEEZING, Disp: 18 g, Rfl: 5  No current facility-administered medications for this visit.         Drug Interactions  No significant drug interactions with repatha per literature    Adherence and Self-Administration  Adherence related to the patient's specialty therapy was discussed with the patient. The Adherence segment of this outreach has been reviewed and  updated.              Methods for Supporting Patient Adherence and/or Self-Administration: see plan, if applicable     Open Medication Therapy Problems  No medication therapy recommendations to display    Goals of Therapy  Goals related to the patient's specialty therapy were discussed with the patient. The Patient Goals segment of this outreach has been reviewed and updated.   Goals Addressed Today    None         Medication Assessment & Plan  Medication Therapy Changes: Patient started today on repatha sureclick 140 mg SC every 2 weeks.   Injection training and medication education provided.   Pt administered/I administered...  Welcome information and patient satisfaction survey to be sent by specialty pharmacy team with patient's initial fill.  Related Plans, Therapy Recommendations, or Therapy Problems to Be Addressed: monitor LDL  Patient will need lipid panel in 6 weeks, order placed.   Patient will continue regular follow-up with cardiology.   Patient will follow up with specialty pharmacy mail-out services for next injection. Care Coordinator to set up future refill outreaches, coordinate prescription delivery, and escalate clinical questions to pharmacist.  Pharmacist to perform regular assessments no more than (6) months from the previous assessment. Will follow-up in 6 months, or sooner if needed.    Initial Education Provided for Specialty Medication  The patient has been provided with the following education and any applicable administration techniques (i.e. self-injection) have been demonstrated for the therapies indicated. All questions and concerns have been addressed prior to the patient receiving the medication, and the patient has verbalized comprehension of the education and any materials provided. Additional patient education shall be provided and documented upon request by the patient, provider, or payer.    REPATHA® (evolocumab)  Medication Expectations   Why am I taking this medication? You  are taking Repatha to lower your “bad” cholesterol (LDL-C). This medication can be used in adults with high blood cholesterol including primary hyperlipidemia and familial hypercholesterolemia.    What should I expect while on this medication? You should expect to see your cholesterol improve over time. Specifically, you should see your LDL-C decrease.    How does the medication work? Repatha works by blocking a protein called PCSK9 that contributes to high levels of bad cholesterol. It helps increase your liver's ability to remove bad cholesterol from your blood.     How long will I be on this medication for? The amount of time you will be on this medication will be determined by your doctor based on your cholesterol and/or your risk of having a cardiac event. You will most likely be on this medication or another cholesterol medication throughout your lifetime. Do not abruptly stop this medication without talking to your doctor first.    How do I take this medication? Take as directed on your prescription label. Repatha is injected under the skin (subcutaneously) of your stomach, thigh, or upper arm. This medication is usually given one or twice a month.   What are some possible side effects? The most common side effects of Repatha include redness, itching, swelling, or pain/tenderness at the injection site, symptoms of the common cold, flu or flu-like symptoms or back pain.    What happens if I miss a dose? If you miss a dose, take it as soon as you remember if it is within 7 days from the usual day of administration then resume your original schedule. If it is beyond 7 days and you use the lenore-2-week dose, skip the missed dose and resume your normal dosing schedule.If it is beyond 7 days and you use the once-monthly dose, inject the dose and start a new schedule based on that date.      Medication Safety   What are things I should warn my doctor immediately about? Talk to your doctor if you are pregnant,  planning to become pregnant, or breastfeeding. Stop the medication and tell your doctor or seek emergency medical help if you notice any signs/symptoms of an allergic reaction (severe rash, redness, hives, severe itching, trouble breathing, or swelling of the face, lips, or tongue). If you have a rubber or latex allergy, you should not use the Repatha SureClick® Autoinjector pen or the prefilled syringe, please notify your doctor or pharmacist.   What are things that I should be cautious of? Be cautious of any side effects from this medication. Talk to your doctor if any new ones develop or aren't getting better.   What are some medications that can interact with this one? There are no known significant drug interactions with Repatha. Always tell your doctor or pharmacist immediately if you start taking any new medications, including over-the-counter medications, vitamins, and herbal supplements.      Medication Storage/Handling   How should I handle this medication? Do not shake or expose the pens, cartridges, or syringes to extreme heat or direct sunlight. Keep this medication out of reach of pets/children. Allow medication to warm at room temperature prior to administration.   How does this medication need to be stored? Store unused pens, cartridges, or syringes in the refrigerator in the original cartons to protect from light. If needed, Repatha may be kept at room temperature in the original carton for up to 30 days. Do not freeze.    How should I dispose of this medication? All the Repatha devices are single-dose and should be discarded in a sharps container after use. If your doctor decides to stop this medication, take to your local police station for proper disposal. Some pharmacies also have take-back bins for medication drop-off.      Resources/Support   How can I remind myself to take this medication? You can download reminder apps to help you manage your refills. You may also set an alarm on your phone  to remind you to take your dose.    Is financial support available?  [employer] can provide co-pay cards if you have commercial insurance or patient assistance if you have Medicare or no insurance.    Which vaccines are recommended for me? Talk to your doctor about these vaccines: Flu, Coronavirus (COVID-19), Pneumococcal (pneumonia), Tdap, Hepatitis B, Zoster (shingles)         Attestation      I attest the patient was actively involved in and has agreed to the above plan of care. If the prescribed therapy is at any point deemed not appropriate based on the current or future assessments, a consultation will be initiated with the patient's specialty care provider to determine the best course of action. The revised plan of therapy will be documented along with any required assessments and/or additional patient education provided.     Julia Benjamin, PharmD  7/24/2025  09:52 EDT

## 2025-07-30 DIAGNOSIS — I25.10 ASCVD (ARTERIOSCLEROTIC CARDIOVASCULAR DISEASE): ICD-10-CM

## 2025-07-30 RX ORDER — ASPIRIN 81 MG/1
81 TABLET, COATED ORAL DAILY
Qty: 30 TABLET | Refills: 3 | Status: SHIPPED | OUTPATIENT
Start: 2025-07-30

## 2025-08-04 ENCOUNTER — HOSPITAL ENCOUNTER (OUTPATIENT)
Dept: CT IMAGING | Facility: HOSPITAL | Age: 50
Discharge: HOME | End: 2025-08-04
Admitting: INTERNAL MEDICINE
Payer: COMMERCIAL

## 2025-08-04 ENCOUNTER — RESULTS FOLLOW-UP (OUTPATIENT)
Dept: ONCOLOGY | Facility: CLINIC | Age: 50
End: 2025-08-04
Payer: COMMERCIAL

## 2025-08-04 DIAGNOSIS — C56.3 MALIGNANT NEOPLASM OF BOTH OVARIES: ICD-10-CM

## 2025-08-04 PROCEDURE — 74177 CT ABD & PELVIS W/CONTRAST: CPT

## 2025-08-04 PROCEDURE — 25510000001 IOPAMIDOL 61 % SOLUTION: Performed by: INTERNAL MEDICINE

## 2025-08-04 PROCEDURE — 74177 CT ABD & PELVIS W/CONTRAST: CPT | Performed by: RADIOLOGY

## 2025-08-04 RX ORDER — IOPAMIDOL 612 MG/ML
100 INJECTION, SOLUTION INTRAVASCULAR
Status: COMPLETED | OUTPATIENT
Start: 2025-08-04 | End: 2025-08-04

## 2025-08-04 RX ADMIN — IOPAMIDOL 100 ML: 612 INJECTION, SOLUTION INTRAVENOUS at 08:34

## 2025-08-05 ENCOUNTER — TELEMEDICINE (OUTPATIENT)
Dept: GYNECOLOGIC ONCOLOGY | Facility: CLINIC | Age: 50
End: 2025-08-05
Payer: COMMERCIAL

## 2025-08-05 DIAGNOSIS — Z85.43 HISTORY OF OVARIAN CANCER: ICD-10-CM

## 2025-08-05 DIAGNOSIS — R53.83 OTHER FATIGUE: Primary | ICD-10-CM

## 2025-08-05 DIAGNOSIS — N95.1 VASOMOTOR SYMPTOMS DUE TO MENOPAUSE: ICD-10-CM

## 2025-08-05 PROCEDURE — 99213 OFFICE O/P EST LOW 20 MIN: CPT | Performed by: NURSE PRACTITIONER

## 2025-08-05 PROCEDURE — 1126F AMNT PAIN NOTED NONE PRSNT: CPT | Performed by: NURSE PRACTITIONER

## 2025-08-14 ENCOUNTER — OFFICE VISIT (OUTPATIENT)
Dept: PULMONOLOGY | Facility: CLINIC | Age: 50
End: 2025-08-14
Payer: COMMERCIAL

## 2025-08-14 ENCOUNTER — LAB (OUTPATIENT)
Dept: LAB | Facility: HOSPITAL | Age: 50
End: 2025-08-14
Payer: COMMERCIAL

## 2025-08-14 VITALS
HEART RATE: 85 BPM | WEIGHT: 153.8 LBS | TEMPERATURE: 98 F | OXYGEN SATURATION: 95 % | BODY MASS INDEX: 22.78 KG/M2 | HEIGHT: 69 IN | SYSTOLIC BLOOD PRESSURE: 104 MMHG | DIASTOLIC BLOOD PRESSURE: 82 MMHG

## 2025-08-14 DIAGNOSIS — G47.10 HYPERSOMNIA: ICD-10-CM

## 2025-08-14 DIAGNOSIS — C56.3 MALIGNANT NEOPLASM OF BOTH OVARIES: ICD-10-CM

## 2025-08-14 DIAGNOSIS — Z51.81 MEDICATION MONITORING ENCOUNTER: ICD-10-CM

## 2025-08-14 DIAGNOSIS — C56.2 MALIGNANT NEOPLASM OF LEFT OVARY: ICD-10-CM

## 2025-08-14 DIAGNOSIS — J96.11 CHRONIC RESPIRATORY FAILURE WITH HYPOXIA: ICD-10-CM

## 2025-08-14 DIAGNOSIS — E78.5 HYPERLIPIDEMIA, UNSPECIFIED HYPERLIPIDEMIA TYPE: ICD-10-CM

## 2025-08-14 DIAGNOSIS — Z87.891 FORMER SMOKER: ICD-10-CM

## 2025-08-14 DIAGNOSIS — J44.9 CHRONIC OBSTRUCTIVE PULMONARY DISEASE, UNSPECIFIED COPD TYPE: Primary | ICD-10-CM

## 2025-08-14 DIAGNOSIS — R53.83 OTHER FATIGUE: ICD-10-CM

## 2025-08-14 DIAGNOSIS — R06.83 SNORING: ICD-10-CM

## 2025-08-14 DIAGNOSIS — Z95.828 PORT-A-CATH IN PLACE: ICD-10-CM

## 2025-08-14 LAB
25(OH)D3 SERPL-MCNC: 46.1 NG/ML (ref 30–100)
ALBUMIN SERPL-MCNC: 4.5 G/DL (ref 3.5–5.2)
ALBUMIN/GLOB SERPL: 1.9 G/DL
ALP SERPL-CCNC: 112 U/L (ref 39–117)
ALT SERPL W P-5'-P-CCNC: 23 U/L (ref 1–33)
ANION GAP SERPL CALCULATED.3IONS-SCNC: 11.3 MMOL/L (ref 5–15)
AST SERPL-CCNC: 21 U/L (ref 1–32)
BASOPHILS # BLD AUTO: 0.05 10*3/MM3 (ref 0–0.2)
BASOPHILS NFR BLD AUTO: 0.7 % (ref 0–1.5)
BILIRUB SERPL-MCNC: 0.2 MG/DL (ref 0–1.2)
BUN SERPL-MCNC: 15.2 MG/DL (ref 6–20)
BUN/CREAT SERPL: 14.3 (ref 7–25)
CALCIUM SPEC-SCNC: 9.3 MG/DL (ref 8.6–10.5)
CANCER AG125 SERPL QL: 10.8 U/ML (ref 0–38.1)
CHLORIDE SERPL-SCNC: 103 MMOL/L (ref 98–107)
CHOLEST SERPL-MCNC: 215 MG/DL (ref 0–200)
CO2 SERPL-SCNC: 25.7 MMOL/L (ref 22–29)
CREAT SERPL-MCNC: 1.06 MG/DL (ref 0.57–1)
DEPRECATED RDW RBC AUTO: 44.4 FL (ref 37–54)
EGFRCR SERPLBLD CKD-EPI 2021: 63.7 ML/MIN/1.73
EOSINOPHIL # BLD AUTO: 0.09 10*3/MM3 (ref 0–0.4)
EOSINOPHIL NFR BLD AUTO: 1.3 % (ref 0.3–6.2)
ERYTHROCYTE [DISTWIDTH] IN BLOOD BY AUTOMATED COUNT: 13.4 % (ref 12.3–15.4)
FERRITIN SERPL-MCNC: 224 NG/ML (ref 13–150)
GLOBULIN UR ELPH-MCNC: 2.4 GM/DL
GLUCOSE SERPL-MCNC: 65 MG/DL (ref 65–99)
HBA1C MFR BLD: 5.35 % (ref 4.8–5.6)
HCT VFR BLD AUTO: 38.7 % (ref 34–46.6)
HDLC SERPL-MCNC: 62 MG/DL (ref 40–60)
HGB BLD-MCNC: 13 G/DL (ref 12–15.9)
IMM GRANULOCYTES # BLD AUTO: 0.02 10*3/MM3 (ref 0–0.05)
IMM GRANULOCYTES NFR BLD AUTO: 0.3 % (ref 0–0.5)
IRON 24H UR-MRATE: 74 MCG/DL (ref 37–145)
LDLC SERPL CALC-MCNC: 132 MG/DL (ref 0–100)
LDLC/HDLC SERPL: 2.09 {RATIO}
LYMPHOCYTES # BLD AUTO: 1.5 10*3/MM3 (ref 0.7–3.1)
LYMPHOCYTES NFR BLD AUTO: 22 % (ref 19.6–45.3)
MCH RBC QN AUTO: 30.1 PG (ref 26.6–33)
MCHC RBC AUTO-ENTMCNC: 33.6 G/DL (ref 31.5–35.7)
MCV RBC AUTO: 89.6 FL (ref 79–97)
MONOCYTES # BLD AUTO: 0.47 10*3/MM3 (ref 0.1–0.9)
MONOCYTES NFR BLD AUTO: 6.9 % (ref 5–12)
NEUTROPHILS NFR BLD AUTO: 4.7 10*3/MM3 (ref 1.7–7)
NEUTROPHILS NFR BLD AUTO: 68.8 % (ref 42.7–76)
NRBC BLD AUTO-RTO: 0 /100 WBC (ref 0–0.2)
PLATELET # BLD AUTO: 304 10*3/MM3 (ref 140–450)
PMV BLD AUTO: 9 FL (ref 6–12)
POTASSIUM SERPL-SCNC: 4.2 MMOL/L (ref 3.5–5.2)
PROT SERPL-MCNC: 6.9 G/DL (ref 6–8.5)
RBC # BLD AUTO: 4.32 10*6/MM3 (ref 3.77–5.28)
SODIUM SERPL-SCNC: 140 MMOL/L (ref 136–145)
TRIGL SERPL-MCNC: 116 MG/DL (ref 0–150)
TSH SERPL DL<=0.05 MIU/L-ACNC: 4.61 UIU/ML (ref 0.27–4.2)
VIT B12 BLD-MCNC: 424 PG/ML (ref 211–946)
VLDLC SERPL-MCNC: 21 MG/DL (ref 5–40)
WBC NRBC COR # BLD AUTO: 6.83 10*3/MM3 (ref 3.4–10.8)

## 2025-08-14 PROCEDURE — 82306 VITAMIN D 25 HYDROXY: CPT

## 2025-08-14 PROCEDURE — 80050 GENERAL HEALTH PANEL: CPT

## 2025-08-14 PROCEDURE — 36415 COLL VENOUS BLD VENIPUNCTURE: CPT

## 2025-08-14 PROCEDURE — 83540 ASSAY OF IRON: CPT

## 2025-08-14 PROCEDURE — 82607 VITAMIN B-12: CPT

## 2025-08-14 PROCEDURE — 86304 IMMUNOASSAY TUMOR CA 125: CPT

## 2025-08-14 PROCEDURE — 82728 ASSAY OF FERRITIN: CPT

## 2025-08-14 PROCEDURE — 80061 LIPID PANEL: CPT

## 2025-08-14 PROCEDURE — 83036 HEMOGLOBIN GLYCOSYLATED A1C: CPT

## 2025-08-14 RX ORDER — ALPRAZOLAM 0.5 MG
TABLET ORAL
COMMUNITY
Start: 2025-07-30

## 2025-08-15 PROBLEM — Z87.891 FORMER SMOKER: Status: ACTIVE | Noted: 2025-08-15

## 2025-08-15 PROBLEM — J44.9 CHRONIC OBSTRUCTIVE PULMONARY DISEASE: Status: ACTIVE | Noted: 2025-08-15

## 2025-08-15 PROBLEM — R06.83 SNORING: Status: ACTIVE | Noted: 2025-08-15

## 2025-08-15 PROBLEM — G47.10 HYPERSOMNIA: Status: ACTIVE | Noted: 2025-08-15

## 2025-08-28 RX ORDER — BUDESONIDE, GLYCOPYRROLATE, AND FORMOTEROL FUMARATE 160; 9; 4.8 UG/1; UG/1; UG/1
AEROSOL, METERED RESPIRATORY (INHALATION)
Qty: 10.7 G | Refills: 5 | Status: SHIPPED | OUTPATIENT
Start: 2025-08-28

## (undated) DEVICE — BNDR ABD PREMIUM/UNIV 10IN 27TO48IN

## (undated) DEVICE — SUT VIC 12X27 D8116 BX/12

## (undated) DEVICE — Device

## (undated) DEVICE — SUT VIC 3/0 SH 27IN J416H

## (undated) DEVICE — 3M™ WARMING BLANKET, UPPER BODY, 10 PER CASE, 42268: Brand: BAIR HUGGER™

## (undated) DEVICE — POLYP TRAP: Brand: TRAPEASE®

## (undated) DEVICE — NDL HYPO ECLPS SFTY 18G 1 1/2IN

## (undated) DEVICE — GLV SURG SENSICARE PI MIC PF SZ6 LF STRL

## (undated) DEVICE — SUT MONOCRYL PLS ANTIB UND 3/0  PS1 27IN

## (undated) DEVICE — DRAPE, LAVH, STERILE: Brand: MEDLINE

## (undated) DEVICE — CYSTO/BLADDER IRRIGATION SET, REGULATING CLAMP

## (undated) DEVICE — SNAR POLYP CAPTIFLX MICRO OVL 13MM 240CM

## (undated) DEVICE — SUT PDS LP 1 TP1 96IN VIO PDP880GA

## (undated) DEVICE — PCH INST SURG INVISISHIELD 2PCKT

## (undated) DEVICE — TOTAL TRAY, 16FR 10ML SIL FOLEY, URN: Brand: MEDLINE

## (undated) DEVICE — SUT PROLN 3/0 8832H

## (undated) DEVICE — DRAPE,UNDERBUTTOCKS,STERILE: Brand: MEDLINE

## (undated) DEVICE — DRAPE,T,LAPARO,TRANS,STERILE: Brand: MEDLINE

## (undated) DEVICE — HOLDER: Brand: DEROYAL

## (undated) DEVICE — CATH URETRL WHSTL TP 5F70CM RT

## (undated) DEVICE — APPL CHLORAPREP TINTED 26ML TEAL

## (undated) DEVICE — UNDERGLV SURG BIOGEL INDICAT PF 8 GRN

## (undated) DEVICE — ANTIBACTERIAL UNDYED BRAIDED (POLYGLACTIN 910), SYNTHETIC ABSORBABLE SUTURE: Brand: COATED VICRYL

## (undated) DEVICE — SYR LUERLOK 30CC

## (undated) DEVICE — YANKAUER,BULB TIP,W/O VENT,RIGID,STERILE: Brand: MEDLINE

## (undated) DEVICE — TRAP FLD MINIVAC MEGADYNE 100ML

## (undated) DEVICE — SUT VICRYL PLS CTD ANTIB CR8 CT1 SZ0 27IN BR/VIL VCPP31D

## (undated) DEVICE — PATIENT RETURN ELECTRODE, SINGLE-USE, CONTACT QUALITY MONITORING, ADULT, WITH 9FT CORD, FOR PATIENTS WEIGING OVER 33LBS. (15KG): Brand: MEGADYNE

## (undated) DEVICE — PK BASIC 70

## (undated) DEVICE — ENSEAL 20 CM SHAFT, LARGE JAW: Brand: ENSEAL X1

## (undated) DEVICE — LEX BASIC NO DRAPE: Brand: MEDLINE INDUSTRIES, INC.

## (undated) DEVICE — HYPODERMIC SAFETY NEEDLE: Brand: MONOJECT

## (undated) DEVICE — SCRB SURG BACTOSHIELD CHG 4PCT 4OZ

## (undated) DEVICE — DRP C/ARM W/BAND W/CLIPS 41X74IN

## (undated) DEVICE — ADHS SKIN PREMIERPRO EXOFIN TOPICAL HI/VISC .5ML

## (undated) DEVICE — THE EXACTO COLD SNARE IS INTENDED TO BE USED WITHOUT DIATHERMIC ENERGY FOR THE ENDOSCOPIC RESECTION OF POLYP TISSUE IN THE GASTROINTESTINAL TRACT.: Brand: EXACTO

## (undated) DEVICE — ELECTRD BLD EZ CLN MOD XLNG 2.75IN

## (undated) DEVICE — PENCL ES MEGADINE EZ/CLEAN BUTN W/HOLSTR 10FT

## (undated) DEVICE — SUT MNCRYL PLS ANTIB UD 4/0 PS2 18IN

## (undated) DEVICE — INTENDED FOR TISSUE SEPARATION, AND OTHER PROCEDURES THAT REQUIRE A SHARP SURGICAL BLADE TO PUNCTURE OR CUT.: Brand: BARD-PARKER ® CARBON RIB-BACK BLADES

## (undated) DEVICE — GLV SURG PREMIERPRO MIC LTX PF SZ7.5 BRN

## (undated) DEVICE — ENDOGATOR AUXILIARY WATER JET CONNECTOR: Brand: ENDOGATOR

## (undated) DEVICE — UNDERGLV SURG BIOGEL INDICAT PF 61/2 GRN

## (undated) DEVICE — Device: Brand: DEFENDO AIR/WATER/SUCTION AND BIOPSY VALVE

## (undated) DEVICE — SILICONE CLAMP COVERS, STERILE, BLUE, 0.29" (7.40MM) X 5", 2/PKG: Brand: KEY SURGICAL SILICONE CLAMP COVERS

## (undated) DEVICE — CONN Y IRR DISP 1P/U

## (undated) DEVICE — ST ACC MICROPUNCTURE TRANSLSS/ECHO/TP .018CM 4F/10CM 21G/7CM

## (undated) DEVICE — PENCL SMOKE/EVAC MEGADYNE TELESCP 10FT